# Patient Record
Sex: FEMALE | Race: OTHER | Employment: UNEMPLOYED | URBAN - METROPOLITAN AREA
[De-identification: names, ages, dates, MRNs, and addresses within clinical notes are randomized per-mention and may not be internally consistent; named-entity substitution may affect disease eponyms.]

---

## 2017-10-15 ENCOUNTER — HOSPITAL ENCOUNTER (EMERGENCY)
Age: 22
Discharge: HOME | End: 2017-10-15
Attending: EMERGENCY MEDICINE
Payer: SELF-PAY

## 2017-10-15 VITALS
HEART RATE: 72 BPM | SYSTOLIC BLOOD PRESSURE: 121 MMHG | TEMPERATURE: 97.2 F | HEIGHT: 64 IN | WEIGHT: 147.49 LBS | OXYGEN SATURATION: 98 % | RESPIRATION RATE: 20 BRPM | DIASTOLIC BLOOD PRESSURE: 71 MMHG | BODY MASS INDEX: 25.18 KG/M2

## 2017-10-15 DIAGNOSIS — N39.0 URINARY TRACT INFECTION WITHOUT HEMATURIA, SITE UNSPECIFIED: Primary | ICD-10-CM

## 2017-10-15 LAB
APPEARANCE UR: CLEAR
BACTERIA URNS QL MICRO: ABNORMAL /HPF
BILIRUB UR QL: NEGATIVE
COLOR UR: ABNORMAL
EPITH CASTS URNS QL MICRO: ABNORMAL /LPF
GLUCOSE UR STRIP.AUTO-MCNC: NEGATIVE MG/DL
HCG UR QL: NEGATIVE
HGB UR QL STRIP: NEGATIVE
HYALINE CASTS URNS QL MICRO: ABNORMAL /LPF (ref 0–5)
KETONES UR QL STRIP.AUTO: NEGATIVE MG/DL
LEUKOCYTE ESTERASE UR QL STRIP.AUTO: ABNORMAL
NITRITE UR QL STRIP.AUTO: NEGATIVE
PH UR STRIP: 7 [PH] (ref 5–8)
PROT UR STRIP-MCNC: NEGATIVE MG/DL
RBC #/AREA URNS HPF: ABNORMAL /HPF (ref 0–5)
SP GR UR REFRACTOMETRY: 1.01 (ref 1–1.03)
UA: UC IF INDICATED,UAUC: ABNORMAL
UROBILINOGEN UR QL STRIP.AUTO: 0.2 EU/DL (ref 0.2–1)
WBC URNS QL MICRO: ABNORMAL /HPF (ref 0–4)

## 2017-10-15 PROCEDURE — 81025 URINE PREGNANCY TEST: CPT

## 2017-10-15 PROCEDURE — 87186 SC STD MICRODIL/AGAR DIL: CPT | Performed by: EMERGENCY MEDICINE

## 2017-10-15 PROCEDURE — 87086 URINE CULTURE/COLONY COUNT: CPT | Performed by: EMERGENCY MEDICINE

## 2017-10-15 PROCEDURE — 99283 EMERGENCY DEPT VISIT LOW MDM: CPT

## 2017-10-15 PROCEDURE — 81001 URINALYSIS AUTO W/SCOPE: CPT | Performed by: EMERGENCY MEDICINE

## 2017-10-15 PROCEDURE — 87077 CULTURE AEROBIC IDENTIFY: CPT | Performed by: EMERGENCY MEDICINE

## 2017-10-15 RX ORDER — PHENAZOPYRIDINE HYDROCHLORIDE 200 MG/1
200 TABLET, FILM COATED ORAL 3 TIMES DAILY
Qty: 6 TAB | Refills: 0 | Status: SHIPPED | OUTPATIENT
Start: 2017-10-15 | End: 2017-10-17

## 2017-10-15 RX ORDER — CEPHALEXIN 500 MG/1
500 CAPSULE ORAL 4 TIMES DAILY
Qty: 28 CAP | Refills: 0 | Status: SHIPPED | OUTPATIENT
Start: 2017-10-15 | End: 2017-10-22

## 2017-10-15 NOTE — DISCHARGE INSTRUCTIONS
Infección urinaria en las mujeres: Instrucciones de cuidado - [ Urinary Tract Infection in Women: Care Instructions ]  Instrucciones de cuidado    Tami infección urinaria (UTI, por monica siglas en inglés) es un término general que hace referencia a tami infección que se produce en cualquier parte entre los riñones y la uretra (conducto por el cual se expulsa la orina). La mayoría de las UTI son infecciones de la vejiga. Con frecuencia, causan dolor o ardor al Kian[place]omal. Las UTI son causadas por bacterias y pueden curarse con antibióticos. Asegúrese de completar el tratamiento para que la infección desaparezca. La atención de seguimiento es tami parte clave de reveles tratamiento y seguridad. Asegúrese de hacer y acudir a todas las citas, y llame a reveles médico si está teniendo problemas. También es tami buena idea saber los resultados de monica exámenes y mantener tami lista de los medicamentos que chang. ¿Cómo puede cuidarse en el hogar? · [address] No deje de tomarlos por el hecho de sentirse mejor. Debe racquel todos los antibióticos hasta terminarlos. · Quiana los próximos rani o [address], danae mayor cantidad de [place] y otros líquidos. Railroad puede ayudar a eliminar las bacterias que provocan la infección. (Si tiene tami enfermedad de los riñones, el corazón o el hígado y tiene que [employer] líquidos, hable con reveles médico antes de aumentar reveles consumo). · Evite las bebidas gaseosas o con cafeína. Pueden irritar la vejiga. · Orine con frecuencia. Trate de vaciar la vejiga cada vez que orine. · Para aliviar el dolor, tome un baño caliente o colóquese tami almohadilla térmica a baja temperatura sobre la parte baja del abdomen o la elodia genital. Nunca se duerma mientras [place] tami almohadilla térmica. Para prevenir las infecciones urinarias  · Danae abundante agua todos los días. Railroad la ayuda a orinar con frecuencia, lo que elimina las bacterias de reveles organismo.  (Si tiene [employer] riñones, el corazón o el hígado y tiene que L[employer] líquidos, hable con reveles médico antes de aumentar reveles consumo). · Orine cuando necesite hacerlo. · Orine inmediatamente después de liliana tenido [employer]. · Cámbiese las toallas sanitarias con frecuencia. · Evite el uso de lavados vaginales, los soy de burbujas, los [place]ter[place] de higiene femenina y otros productos para la higiene femenina que contengan desodorantes. · Después de ir al baño, límpiese de adelante hacia atrás. ¿Cuándo debe pedir ayuda? Llame a reveles médico ahora mismo o busque atención médica inmediata si:  · Aparecen síntomas erik fiebre, escalofríos, náuseas o vómito por [place], o empeoran. · Tiene un nuevo dolor de espalda ced debajo de las [place]. [place] se llama dolor en el flanco.  · Tiene rosalee o pus nuevos en la orina. · Tiene problemas con reveles antibiótico.  Preste especial atención a los cambios en reveles jv y asegúrese de comunicarse con reveles médico si:  · No mejora después de liliana tomado un antibiótico manju 2 días. · Bonnie síntomas desaparecen y [employer]. ¿Dónde puede encontrar más información en inglés? Natalia Joni a http://thea-kaylee.info/. Kayla Fiore G661 en la búsqueda para aprender más acerca de \"Infección urinaria en las mujeres: Instrucciones de cuidado - [ Urinary Tract Infection in Women: Care Instructions ]. \"  Revisado: 28 noviembre, 2016  Versión del contenido: 11.3  © 7993-7306 [employer]. Las instrucciones de cuidado fueron adaptadas bajo licencia por [employer] (which disclaims liability or warranty for this information). Si usted tiene [place] afección médica o sobre estas instrucciones, siempre pregunte a reveles profesional de jv. [facility] [employer] niega toda garantía o responsabilidad por reveles uso de esta información.

## 2017-10-15 NOTE — ED PROVIDER NOTES
HPI Comments: 24 y.o. female with past medical history significant for UTI who presents from home with chief complaint of dysuria. Pt reports 1 week hx of dysuria accompanied by left flank pain. Pt notes hx of similar symptoms in the past related to UTI (last UTI x 1 year ago). She has been drinking cranberry juice without relief. Pt states that she has felt warm but has not had a known fever. Pt further notes hx of miscarriage x 4 months ago and was treated at [facility]. She states that there is a possibility that she is pregnant. Pt denies vaginal discharge or atypical bleeding. No nausea, vomiting, diarrhea, constipation or abdominal pain. There are no other acute medical concerns at this time. Social hx: Non-smoker. No EtOH or illicit drug use. PCP: None    Note written by Veena Adams, as dictated by Dieter Cowart NP 2:03 AM      The history is provided by the patient. No past medical history on file. No past surgical history on file. No family history on file. Social History     Social History    Marital status: N/A     Spouse name: N/A    Number of children: N/A    Years of education: N/A     Occupational History    Not on file. Social History Main Topics    Smoking status: Not on file    Smokeless tobacco: Not on file    Alcohol use Not on file    Drug use: Not on file    Sexual activity: Not on file     Other Topics Concern    Not on file     Social History Narrative         ALLERGIES: Review of patient's allergies indicates no known allergies. Review of Systems   Constitutional: Negative for fever. HENT: Negative for congestion and sore throat. Eyes: Negative for photophobia. Respiratory: Negative for cough and shortness of breath. Cardiovascular: Negative for chest pain and leg swelling. Gastrointestinal: Negative for abdominal pain, constipation, diarrhea, nausea and vomiting. Genitourinary: Positive for dysuria and flank pain (left). Negative for difficulty urinating, hematuria, vaginal bleeding and vaginal discharge. Musculoskeletal: Negative for back pain and neck pain. Skin: Negative for rash. Neurological: Negative for dizziness, weakness, numbness and headaches. All other systems reviewed and are negative. Vitals:    10/15/17 0023   BP: 121/71   Pulse: 72   Resp: 20   Temp: 97.2 °F (36.2 °C)   SpO2: 98%   Weight: 66.9 kg (147 lb 7.8 oz)   Height: 5' 4\" (1.626 m)            Physical Exam   Constitutional: She is oriented to person, place, and time. She appears well-developed and well-nourished. No distress. HENT:   Head: Normocephalic and atraumatic. Right Ear: External ear normal.   Left Ear: External ear normal.   Nose: Nose normal.   Mouth/Throat: Oropharynx is clear and moist. No oropharyngeal exudate. Eyes: Conjunctivae and EOM are normal. Pupils are equal, round, and reactive to light. Neck: Normal range of motion. Neck supple. Cardiovascular: Normal rate, regular rhythm, normal heart sounds and intact distal pulses. Pulmonary/Chest: Effort normal and breath sounds normal.   Abdominal: Soft. Bowel sounds are normal. There is no tenderness. There is no rebound and no guarding. Musculoskeletal: Normal range of motion. Neurological: She is alert and oriented to person, place, and time. Skin: Skin is warm and dry. Psychiatric: She has a normal mood and affect. Her behavior is normal. Judgment and thought content normal.   Nursing note and vitals reviewed.        MDM  Number of Diagnoses or Management Options  Urinary tract infection without hematuria, site unspecified:      Amount and/or Complexity of Data Reviewed  Clinical lab tests: ordered and reviewed  Review and summarize past medical records: yes      ED Course       Procedures    LABORATORY TESTS:  Recent Results (from the past 12 hour(s))   HCG URINE, QL. - POC    Collection Time: 10/15/17 12:39 AM   Result Value Ref Range    Pregnancy test,urine (POC) NEGATIVE  NEG     URINALYSIS W/ REFLEX CULTURE    Collection Time: 10/15/17 12:40 AM   Result Value Ref Range    Color YELLOW/STRAW      Appearance CLEAR CLEAR      Specific gravity 1.006 1.003 - 1.030      pH (UA) 7.0 5.0 - 8.0      Protein NEGATIVE  NEG mg/dL    Glucose NEGATIVE  NEG mg/dL    Ketone NEGATIVE  NEG mg/dL    Bilirubin NEGATIVE  NEG      Blood NEGATIVE  NEG      Urobilinogen 0.2 0.2 - 1.0 EU/dL    Nitrites NEGATIVE  NEG      Leukocyte Esterase MODERATE (A) NEG      WBC  0 - 4 /hpf    RBC 0-5 0 - 5 /hpf    Epithelial cells FEW FEW /lpf    Bacteria 4+ (A) NEG /hpf    UA:UC IF INDICATED URINE CULTURE ORDERED (A) CNI      Hyaline cast 0-2 0 - 5 /lpf       IMAGING RESULTS:  No orders to display       MEDICATIONS GIVEN:  Medications - No data to display    IMPRESSION:  1. Urinary tract infection without hematuria, site unspecified        PLAN:  1. Discharge Medication List as of 10/15/2017  1:26 AM      START taking these medications    Details   cephALEXin (KEFLEX) 500 mg capsule Take 1 Cap by mouth four (4) times daily for 7 days. , Print, Disp-28 Cap, R-0      phenazopyridine (PYRIDIUM) 200 mg tablet Take 1 Tab by mouth three (3) times daily for 2 days. , Print, Disp-6 Tab, R-0           2. Follow-up Information     Follow up With Details Comments Via Alisa Marie Schedule an appointment as soon as possible for a visit  [address] [facility]. [address] [place] [address] Schedule an appointment as soon as possible for a visit for women's health concerns  [address] [place] [address] [place] [address]  [phone]    [facility] EMERGENCY DEPT Go to As needed, If symptoms worsen [facility]  [phone]        3. Return to ED if worse     Discharge Note:    The patient is ready for discharge.  The patient's signs, symptoms, diagnosis, and discharge instruction have been discussed and the patient has conveyed their understanding. The patient is to follow up as recommended or return to the ER should their symptoms worsen. Plan has been discussed and the patient is in agreement.     Zachery Childress NP

## 2017-10-17 LAB
BACTERIA SPEC CULT: ABNORMAL
CC UR VC: ABNORMAL
SERVICE CMNT-IMP: ABNORMAL

## 2017-11-27 ENCOUNTER — HOSPITAL ENCOUNTER (EMERGENCY)
Age: 22
Discharge: HOME | End: 2017-11-28
Attending: EMERGENCY MEDICINE | Admitting: EMERGENCY MEDICINE
Payer: SELF-PAY

## 2017-11-27 DIAGNOSIS — Z32.01 PREGNANCY TEST PERFORMED, PREGNANCY CONFIRMED: ICD-10-CM

## 2017-11-27 DIAGNOSIS — J20.9 ACUTE BRONCHITIS, UNSPECIFIED ORGANISM: Primary | ICD-10-CM

## 2017-11-27 LAB
APPEARANCE UR: CLEAR
BASOPHILS # BLD: 0.1 K/UL (ref 0–0.1)
BASOPHILS NFR BLD: 1 % (ref 0–1)
BILIRUB UR QL: NEGATIVE
COLOR UR: NORMAL
DEPRECATED S PYO AG THROAT QL EIA: NEGATIVE
EOSINOPHIL # BLD: 0.3 K/UL (ref 0–0.4)
EOSINOPHIL NFR BLD: 3 % (ref 0–7)
ERYTHROCYTE [DISTWIDTH] IN BLOOD BY AUTOMATED COUNT: 14.9 % (ref 11.5–14.5)
GLUCOSE UR STRIP.AUTO-MCNC: NEGATIVE MG/DL
HCG UR QL: POSITIVE
HCT VFR BLD AUTO: 36.3 % (ref 35–47)
HGB BLD-MCNC: 12.1 G/DL (ref 11.5–16)
HGB UR QL STRIP: NEGATIVE
KETONES UR QL STRIP.AUTO: NEGATIVE MG/DL
LEUKOCYTE ESTERASE UR QL STRIP.AUTO: NEGATIVE
LYMPHOCYTES # BLD: 2.7 K/UL (ref 0.8–3.5)
LYMPHOCYTES NFR BLD: 28 % (ref 12–49)
MCH RBC QN AUTO: 29.9 PG (ref 26–34)
MCHC RBC AUTO-ENTMCNC: 33.3 G/DL (ref 30–36.5)
MCV RBC AUTO: 89.6 FL (ref 80–99)
MONOCYTES # BLD: 0.6 K/UL (ref 0–1)
MONOCYTES NFR BLD: 7 % (ref 5–13)
NEUTS SEG # BLD: 6 K/UL (ref 1.8–8)
NEUTS SEG NFR BLD: 61 % (ref 32–75)
NITRITE UR QL STRIP.AUTO: NEGATIVE
PH UR STRIP: 7 [PH] (ref 5–8)
PLATELET # BLD AUTO: 363 K/UL (ref 150–400)
PROT UR STRIP-MCNC: NEGATIVE MG/DL
RBC # BLD AUTO: 4.05 M/UL (ref 3.8–5.2)
SP GR UR REFRACTOMETRY: 1.02 (ref 1–1.03)
UROBILINOGEN UR QL STRIP.AUTO: 1 EU/DL (ref 0.2–1)
WBC # BLD AUTO: 9.6 K/UL (ref 3.6–11)

## 2017-11-27 PROCEDURE — 81003 URINALYSIS AUTO W/O SCOPE: CPT | Performed by: EMERGENCY MEDICINE

## 2017-11-27 PROCEDURE — 99283 EMERGENCY DEPT VISIT LOW MDM: CPT

## 2017-11-27 PROCEDURE — 84702 CHORIONIC GONADOTROPIN TEST: CPT | Performed by: EMERGENCY MEDICINE

## 2017-11-27 PROCEDURE — 36415 COLL VENOUS BLD VENIPUNCTURE: CPT | Performed by: EMERGENCY MEDICINE

## 2017-11-27 PROCEDURE — 74011250637 HC RX REV CODE- 250/637: Performed by: EMERGENCY MEDICINE

## 2017-11-27 PROCEDURE — 87070 CULTURE OTHR SPECIMN AEROBIC: CPT | Performed by: EMERGENCY MEDICINE

## 2017-11-27 PROCEDURE — 80053 COMPREHEN METABOLIC PANEL: CPT | Performed by: EMERGENCY MEDICINE

## 2017-11-27 PROCEDURE — 87880 STREP A ASSAY W/OPTIC: CPT | Performed by: EMERGENCY MEDICINE

## 2017-11-27 PROCEDURE — 86900 BLOOD TYPING SEROLOGIC ABO: CPT | Performed by: EMERGENCY MEDICINE

## 2017-11-27 PROCEDURE — 85025 COMPLETE CBC W/AUTO DIFF WBC: CPT | Performed by: EMERGENCY MEDICINE

## 2017-11-27 PROCEDURE — 81025 URINE PREGNANCY TEST: CPT

## 2017-11-27 PROCEDURE — 87804 INFLUENZA ASSAY W/OPTIC: CPT | Performed by: EMERGENCY MEDICINE

## 2017-11-27 RX ORDER — ACETAMINOPHEN 325 MG/1
650 TABLET ORAL
Status: COMPLETED | OUTPATIENT
Start: 2017-11-27 | End: 2017-11-27

## 2017-11-27 RX ADMIN — ACETAMINOPHEN 650 MG: 325 TABLET ORAL at 23:39

## 2017-11-27 NOTE — Clinical Note
- Azithromycin as prescribed for bronchitis. - Begin prenatal vitamins. 
- Please follow-up at one of the resources provided for prenatal care. - Return to ED for any concerns. - Azitromicina erik se prescribe para la bronquitis. - Comience con l as vitaminas prenatales. - Jennifer un seguimiento en rani de los recursos provistos para la atención prenatal. 
- Brandy Banana a ED por cualquier inquietud.

## 2017-11-28 ENCOUNTER — APPOINTMENT (OUTPATIENT)
Dept: ULTRASOUND IMAGING | Age: 22
End: 2017-11-28
Attending: EMERGENCY MEDICINE
Payer: SELF-PAY

## 2017-11-28 VITALS
OXYGEN SATURATION: 98 % | DIASTOLIC BLOOD PRESSURE: 85 MMHG | RESPIRATION RATE: 18 BRPM | SYSTOLIC BLOOD PRESSURE: 118 MMHG | TEMPERATURE: 97.3 F | HEART RATE: 86 BPM

## 2017-11-28 LAB
ABO + RH BLD: NORMAL
ALBUMIN SERPL-MCNC: 3.9 G/DL (ref 3.5–5)
ALBUMIN/GLOB SERPL: 1.1 {RATIO} (ref 1.1–2.2)
ALP SERPL-CCNC: 97 U/L (ref 45–117)
ALT SERPL-CCNC: 98 U/L (ref 12–78)
ANION GAP SERPL CALC-SCNC: 10 MMOL/L (ref 5–15)
AST SERPL-CCNC: 56 U/L (ref 15–37)
BILIRUB SERPL-MCNC: 0.3 MG/DL (ref 0.2–1)
BLOOD BANK CMNT PATIENT-IMP: NORMAL
BUN SERPL-MCNC: 8 MG/DL (ref 6–20)
BUN/CREAT SERPL: 12 (ref 12–20)
CALCIUM SERPL-MCNC: 8.9 MG/DL (ref 8.5–10.1)
CHLORIDE SERPL-SCNC: 103 MMOL/L (ref 97–108)
CLUE CELLS VAG QL WET PREP: NORMAL
CO2 SERPL-SCNC: 26 MMOL/L (ref 21–32)
CREAT SERPL-MCNC: 0.65 MG/DL (ref 0.55–1.02)
FLUAV AG NPH QL IA: NEGATIVE
FLUBV AG NOSE QL IA: NEGATIVE
GLOBULIN SER CALC-MCNC: 3.4 G/DL (ref 2–4)
GLUCOSE SERPL-MCNC: 88 MG/DL (ref 65–100)
HCG SERPL-ACNC: 2213 MIU/ML (ref 0–6)
KOH PREP SPEC: NORMAL
POTASSIUM SERPL-SCNC: 3.6 MMOL/L (ref 3.5–5.1)
PROT SERPL-MCNC: 7.3 G/DL (ref 6.4–8.2)
SERVICE CMNT-IMP: NORMAL
SODIUM SERPL-SCNC: 139 MMOL/L (ref 136–145)
T VAGINALIS VAG QL WET PREP: NORMAL

## 2017-11-28 PROCEDURE — 87210 SMEAR WET MOUNT SALINE/INK: CPT | Performed by: EMERGENCY MEDICINE

## 2017-11-28 PROCEDURE — 76801 OB US < 14 WKS SINGLE FETUS: CPT

## 2017-11-28 PROCEDURE — 87491 CHLMYD TRACH DNA AMP PROBE: CPT | Performed by: EMERGENCY MEDICINE

## 2017-11-28 PROCEDURE — 76817 TRANSVAGINAL US OBSTETRIC: CPT

## 2017-11-28 RX ORDER — AZITHROMYCIN 250 MG/1
TABLET, FILM COATED ORAL
Qty: 6 TAB | Refills: 0 | Status: SHIPPED | OUTPATIENT
Start: 2017-11-28 | End: 2017-12-03

## 2017-11-28 NOTE — ED TRIAGE NOTES
Pt has a fever and cough and cold for 2 weeks. No medicine taken today. Pt says \"by the way\" on Thursday she \"took an at home pregnancy test and it was positive. \"      Pt reports pain in abdomen. Pt is Bengali speaking. Blue phones used. 10:32 PM  Pt is in waiting room with family, playing with her child, alert and in no distress.

## 2017-11-28 NOTE — ED NOTES
Discharge instructions given to pt. By provider. All questions answered and pt verbalized understanding. V/S stable @ time of discharge. No lines or drains in place. Pt ambulatory out of unit.

## 2017-11-28 NOTE — DISCHARGE INSTRUCTIONS
Bronquitis: Instrucciones de cuidado - [ Bronchitis: Care Instructions ]  Instrucciones de cuidado    La bronquitis es tami inflamación de los bronquios (conductos bronquiales), que llevan aire a los pulmones. Los tubos se hinchan y producen mucosidad, o flema. La mucosidad y los bronquios inflamados hacen que tosa. Es posible que tenga problemas para respirar. Ramonia Musca de los casos de bronquitis son causados por virus erik los que causan los resfriados. Los antibióticos generalmente no ayudan y pueden ser dañinos. La bronquitis suele aparecer con Barnie Pen y dura alrededor de 2 a 3 semanas en personas que por lo demás son saludables. La atención de seguimiento es tami parte clave de reveles tratamiento y seguridad. Asegúrese de hacer y acudir a todas las citas, y llame a reveles médico si está teniendo problemas. También es tami buena idea saber los resultados de los exámenes y mantener tami lista de los medicamentos que chang. ¿Cómo puede cuidarse en el hogar? · [employer] medicamentos exactamente erik le fueron recetados. Llame a reveles médico si tiffanie que está teniendo un problema con monica medicamentos. · Descanse un poco más. · [place] un analgésico (medicamento para el dolor) de venta mona, erik acetaminofén (Tylenol), ibuprofeno (Advil, Motrin) o naproxeno (Aleve), para reducir la fiebre y [employer] lemuel en el cuerpo. Roxann y siga todas las instrucciones de la [place]. · No tome dos o más analgésicos al mismo tiempo a menos que el médico se lo haya indicado. Muchos analgésicos contienen acetaminofén, es decir, Tylenol. El exceso de acetaminofén (Tylenol) puede ser dañino. · [place] un medicamento para la tos de venta mona que contenga dextrometorfano para ayudar a aliviar la tos seca y persistente y así poder dormir. Evite los medicamentos para la tos que tengan más de un ingrediente [place]. Roxann y siga todas las instrucciones de la [place].   · Respire aire húmedo de un humidificador, ducha caliente o lavabo lleno de [place]. El calor y la humedad adelgazarán la mucosidad para que pueda expulsarla. · No fume. Fumar puede empeorar la bronquitis. Si necesita ayuda para dejar de fumar, hable con reveles médico sobre programas y medicamentos para dejar de fumar. Estos pueden aumentar monica probabilidades de dejar el hábito para siempre. ¿Cuándo debe pedir ayuda? Llame al 911 en cualquier momento que considere que necesita atención de emergencia. Por ejemplo, llame si:  ? · [address] dificultades para respirar. ? Llame a reveles médico ahora mismo o busque atención médica inmediata si:  ? · Tiene nueva o peor dificultad para respirar. ? · Tose mucosidad (esputo) de color café oscuro o con rosalee. ? · Tiene tami fiebre nueva o más naz. ? · Tiene un salpullido nuevo. ?Preste especial atención a los cambios en reveles jv y asegúrese de comunicarse con reveles médico si:  ? · Reveles tos es más profunda o más frecuente que antes, especialmente si nota más mucosidad o un cambio en el color de la mucosidad. ? · No mejora erik se esperaba. ¿Dónde puede encontrar más información en inglés? [address] a http://thea-kaylee.info/. Escriba H333 en la búsqueda para aprender más acerca de \"Bronquitis: Instrucciones de cuidado - [ Bronchitis: Care Instructions ]. \"  Revisado: 12 dixon, 2017  Versión del contenido: 11.4  © 1380-3382 Healthwise, Incorporated. Las instrucciones de cuidado fueron adaptadas bajo licencia por [employer] (which disclaims liability or warranty for this information). Si usted tiene [place] afección médica o sobre estas instrucciones, siempre pregunte a reveles profesional de jv. Healthwise, Incorporated niega toda garantía o responsabilidad por reveles uso de esta información.     Amenaza de aborto espontáneo: Después de tami visita a la hugo de emergencias  [Threatened Miscarriage: After Your Visit to the Emergency Room]  Instrucciones de cuidado  Algunas mujeres tienen Jhonny o sangrado vaginal leve manju las primeras 12 semanas del embarazo. En algunos casos, esto es normal. El manchado o sangrado vaginal leve también puede ser tami señal de [facility] posible pérdida del embarazo. [place] se conoce erik amenaza de aborto espontáneo. En jp punto, es posible que el médico no pueda decirle si reveles sangrado vaginal es normal o es tami amenaza de aborto espontáneo. Al principio del embarazo, cosas erik el estrés, el ejercicio y las relaciones sexuales no provocan sangrado vaginal o aborto espontáneo. Podría estar preocupada o cristofer por la posibilidad de perder el embarazo. [place] no se culpe. No existe un tratamiento para detener tami amenaza de aborto espontáneo. Si tiene un aborto espontáneo, no hay nada que pueda liliana hecho para prevenirlo. Un aborto espontáneo generalmente significa que el embarazo no se está desarrollando normalmente. Aunque haya sido rita de naz de la hugo de emergencias, todavía es necesario que esté atenta a cualquier problema que se presente. El médico le hizo un cuidadoso chequeo. Erum a veces los problemas pueden aparecer posteriormente. Si tiene nuevos síntomas o éstos no mejoran, regrese a la hugo de emergencias o llame a reveles médico de inmediato. Acudir a la hugo de emergencias es solo un paso en reveles tratamiento. Aunque se sienta mejor, todavía deberá hacer lo que reveels médico le recomiende, erik asistir a todas las visitas de seguimiento sugeridas y racquel los medicamentos exactamente [employer]	[place] fueron indicados. [place] le ayudará a recuperarse y prevenir problemas futuros. ¿Cómo puede cuidarse en el hogar? · Si tiene un aborto espontáneo, es probable que experimente algo de sangrado vaginal manju 1 a 2 semanas. Use toallas sanitarias en lugar de tampones. Cuente las toallas sanitarias que [place] cada día y lleve un registro de la cantidad. [place] la ayudará a saber si el sangrado está disminuyendo. · [place] acetaminofén (Tylenol) para los cólicos (retortijones).  Roxann y siga todas las instrucciones de la etiqueta. · No tome dos o más analgésicos (medicamentos para el dolor) al [employer] tiempo a menos que el médico se lo haya indicado. Muchos analgésicos contienen acetaminofén, es decir, Tylenol. El exceso de acetaminofén (Tylenol) puede ser dañino. · No tenga relaciones sexuales hasta que reveles médico lo apruebe. · Descanse mucho manju los lilly siguientes. · Puede hacer monica actividades habituales si se siente lo suficientemente niall para hacerlas. [place] no toni ejercicio pesado hasta que reveles médico lo apruebe. · Consuma tami dieta balanceada uma en bhupinder y vitamina C. Entre los alimentos ricos en bhupinder se encuentran las nadya hernandez, los [facility], los SANDEFRD, los frijoles (habichuelas) y los vegetales de hojas verdes. Los alimentos con alto contenido de vitamina C incluyen las frutas cítricas, los tomates y el brócoli. Hable con reveles médico acerca de si usted necesita racquel pastillas de bhupinder o un multivitamínico.  · No mike alcohol, ni use tabaco o drogas ilegales. · No fume. Si necesita ayuda para dejar de fumar, hable con reveles médico acerca de los programas y medicamentos para dejar de fumar. Estos pueden aumentar monica probabilidades de dejar el hábito para siempre. ¿Cuándo debe pedir ayuda? Llame al 911 si:  · Tiene dolor repentino e intenso en el vientre. · Se desmayó (perdió el conocimiento). Regrese ahora mismo a la hugo de emergencias si:  · Tiene sangrado vaginal intenso. Elimina coágulos de [place]' y empapa por completo las toallas sanitarias usuales cada hora manju 2 o más horas. · Vuelve el sangrado vaginal.  Llame hoy a reveles médico si:  · Tiene cólicos o dolor en el vientre o la pelvis. · Tiene fiebre. · Tiene flujo vaginal con un olor desagradable. · El sangrado vaginal no se ha detenido completamente después de 3 días. ¿Dónde puede encontrar más información en inglés?    Vaya a [email or web address].be  [place]  L344 en la búsqueda para aprender más acerca de \"Amenaza de aborto espontáneo: Después de tami visita a la hugo de emergencias. \"   © 3110-8946 Healthwise, Incorporated. Instrucciones de cuidado adaptadas por Cherl Grain (which disclaims liability or warranty for this information). Estas instrucciones de cuidado son para usarlas con reveles profesional clínico registrado. Si usted tiene preguntas acerca de tami condición médica o acerca de estas instrucciones de cuidado, siempre pregúntele a reveles profesional clínico registrado. Healthwise, Incorporated no acepta ninguna garantía ni responsabilidad por el uso de [employer].   Versión del contenido: 9.1.28139; Última revisión: 13 enero, 2011

## 2017-11-28 NOTE — ED PROVIDER NOTES
HPI Comments: The patient presents to the ED with multiple complaints. She notes she is pregnant with LMP on 10/15/17 and positive pregnancy test on Friday. She is . She was in her usual state of health until 2 weeks ago. She developed subjective fever on Thursday. She feels she has had a fever every day since Thursday. She has also had a cough productive of white phlegm for 2 weeks. She has had a lot of generalized headaches and feels dizzy. She denies rhinorrhea or nasal congestion. She has a sore throat. She feels she has the the flu. She notes uterine pain. Pain is 5/10. Her uterine pain began on Friday. She denies any vaginal bleeding. She denies any vaginal discharge. She has dysuria. She denies any known sick contacts. She has not had her flu vaccine this year. She has been taking Advil for her symptoms. Patient is a 20 y.o. female presenting with fever, cough, pregnancy problem, and abdominal pain. The history is provided by the patient. Fever    Associated symptoms include headaches, sore throat and cough. Pertinent negatives include no chest pain, no diarrhea, no vomiting, no congestion, no shortness of breath and no rash. Cough   Associated symptoms include headaches and sore throat. Pertinent negatives include no chest pain, no chills, no shortness of breath, no nausea and no vomiting. Pregnancy Problem    Associated symptoms include a fever (subjective), dysuria and headaches. Pertinent negatives include no diarrhea, no nausea, no vomiting and no chest pain. Abdominal Pain    Associated symptoms include a fever (subjective), dysuria and headaches. Pertinent negatives include no diarrhea, no nausea, no vomiting and no chest pain. No past medical history on file. No past surgical history on file. No family history on file.     Social History     Social History    Marital status: SINGLE     Spouse name: N/A    Number of children: N/A    Years of education: N/A Occupational History    Not on file. Social History Main Topics    Smoking status: Not on file    Smokeless tobacco: Not on file    Alcohol use Not on file    Drug use: Not on file    Sexual activity: Not on file     Other Topics Concern    Not on file     Social History Narrative    No narrative on file         ALLERGIES: Review of patient's allergies indicates no known allergies. Review of Systems   Constitutional: Positive for fever (subjective). Negative for appetite change and chills. HENT: Positive for sore throat. Negative for congestion and nosebleeds. Eyes: Negative for pain and discharge. Respiratory: Positive for cough. Negative for shortness of breath. Cardiovascular: Negative for chest pain. Gastrointestinal: Positive for abdominal pain. Negative for diarrhea, nausea and vomiting. Genitourinary: Positive for dysuria and pelvic pain. Musculoskeletal: Negative. Skin: Negative for rash. Neurological: Positive for dizziness and headaches. Negative for weakness. Hematological: Negative for adenopathy. Psychiatric/Behavioral: Negative. All other systems reviewed and are negative. Vitals:    11/27/17 2138   BP: 117/75   Pulse: 93   Resp: 20   Temp: 97.9 °F (36.6 °C)   SpO2: 97%            Physical Exam   Constitutional: She is oriented to person, place, and time. She appears well-developed and well-nourished. HENT:   Head: Normocephalic and atraumatic. Mouth/Throat: Oropharynx is clear and moist.   Eyes: Conjunctivae are normal.   Neck: Normal range of motion. Neck supple. Cardiovascular: Normal rate, regular rhythm and normal heart sounds. Pulmonary/Chest: Effort normal and breath sounds normal.   clear   Abdominal: Soft. Bowel sounds are normal. There is no tenderness. Genitourinary: No vaginal discharge found. Genitourinary Comments: Mild uterine TTP. Cervix closed. No adnexal TTP. Musculoskeletal: Normal range of motion.  She exhibits no edema or tenderness. Neurological: She is alert and oriented to person, place, and time. Skin: Skin is warm and dry. Psychiatric: She has a normal mood and affect. Her behavior is normal.   Nursing note and vitals reviewed. [facility]  ED Course       Procedures    A/P:  1. Bronchitis - symptoms for >2 weeks. Treat with azithromycin. 2. Pregnancy, early - begin PNV. F/U with ob/gyn. List of resources provided. 3. Pelvic pain - resolved prior to discharge. Advised acetaminophen prn. Patient's results have been reviewed with them. Patient and/or family have verbally conveyed their understanding and agreement of the patient's signs, symptoms, diagnosis, treatment and prognosis and additionally agree to follow up as recommended or return to the Emergency Room should their condition change prior to follow-up. Discharge instructions have also been provided to the patient with some educational information regarding their diagnosis as well a list of reasons why they would want to return to the ER prior to their follow-up appointment should their condition change.

## 2017-11-29 LAB
C TRACH DNA SPEC QL NAA+PROBE: NEGATIVE
N GONORRHOEA DNA SPEC QL NAA+PROBE: NEGATIVE
SAMPLE TYPE: NORMAL
SERVICE CMNT-IMP: NORMAL
SPECIMEN SOURCE: NORMAL

## 2017-11-30 LAB
BACTERIA SPEC CULT: NORMAL
SERVICE CMNT-IMP: NORMAL

## 2017-12-06 ENCOUNTER — APPOINTMENT (OUTPATIENT)
Dept: ULTRASOUND IMAGING | Age: 22
End: 2017-12-06
Attending: PHYSICIAN ASSISTANT
Payer: SELF-PAY

## 2017-12-06 ENCOUNTER — HOSPITAL ENCOUNTER (EMERGENCY)
Age: 22
Discharge: HOME | End: 2017-12-06
Attending: EMERGENCY MEDICINE
Payer: SELF-PAY

## 2017-12-06 VITALS
DIASTOLIC BLOOD PRESSURE: 69 MMHG | OXYGEN SATURATION: 98 % | SYSTOLIC BLOOD PRESSURE: 117 MMHG | TEMPERATURE: 98.1 F | HEIGHT: 65 IN | HEART RATE: 88 BPM | RESPIRATION RATE: 20 BRPM

## 2017-12-06 DIAGNOSIS — O03.9 COMPLETE MISCARRIAGE: Primary | ICD-10-CM

## 2017-12-06 LAB
ALBUMIN SERPL-MCNC: 3.7 G/DL (ref 3.5–5)
ALBUMIN/GLOB SERPL: 1.1 {RATIO} (ref 1.1–2.2)
ALP SERPL-CCNC: 90 U/L (ref 45–117)
ALT SERPL-CCNC: 62 U/L (ref 12–78)
ANION GAP SERPL CALC-SCNC: 10 MMOL/L (ref 5–15)
AST SERPL-CCNC: 33 U/L (ref 15–37)
BASOPHILS # BLD: 0 K/UL (ref 0–0.1)
BASOPHILS NFR BLD: 0 % (ref 0–1)
BILIRUB SERPL-MCNC: 0.4 MG/DL (ref 0.2–1)
BUN SERPL-MCNC: 10 MG/DL (ref 6–20)
BUN/CREAT SERPL: 20 (ref 12–20)
CALCIUM SERPL-MCNC: 8.8 MG/DL (ref 8.5–10.1)
CHLORIDE SERPL-SCNC: 102 MMOL/L (ref 97–108)
CO2 SERPL-SCNC: 25 MMOL/L (ref 21–32)
CREAT SERPL-MCNC: 0.5 MG/DL (ref 0.55–1.02)
EOSINOPHIL # BLD: 0.1 K/UL (ref 0–0.4)
EOSINOPHIL NFR BLD: 1 % (ref 0–7)
ERYTHROCYTE [DISTWIDTH] IN BLOOD BY AUTOMATED COUNT: 14.4 % (ref 11.5–14.5)
GLOBULIN SER CALC-MCNC: 3.3 G/DL (ref 2–4)
GLUCOSE SERPL-MCNC: 117 MG/DL (ref 65–100)
HCG SERPL-ACNC: ABNORMAL MIU/ML (ref 0–6)
HCT VFR BLD AUTO: 35.9 % (ref 35–47)
HGB BLD-MCNC: 12.5 G/DL (ref 11.5–16)
LYMPHOCYTES # BLD: 2.1 K/UL (ref 0.8–3.5)
LYMPHOCYTES NFR BLD: 22 % (ref 12–49)
MCH RBC QN AUTO: 30.3 PG (ref 26–34)
MCHC RBC AUTO-ENTMCNC: 34.8 G/DL (ref 30–36.5)
MCV RBC AUTO: 87.1 FL (ref 80–99)
MONOCYTES # BLD: 0.5 K/UL (ref 0–1)
MONOCYTES NFR BLD: 5 % (ref 5–13)
NEUTS SEG # BLD: 7 K/UL (ref 1.8–8)
NEUTS SEG NFR BLD: 72 % (ref 32–75)
PLATELET # BLD AUTO: 302 K/UL (ref 150–400)
POTASSIUM SERPL-SCNC: 4.3 MMOL/L (ref 3.5–5.1)
PROT SERPL-MCNC: 7 G/DL (ref 6.4–8.2)
RBC # BLD AUTO: 4.12 M/UL (ref 3.8–5.2)
SODIUM SERPL-SCNC: 137 MMOL/L (ref 136–145)
WBC # BLD AUTO: 9.8 K/UL (ref 3.6–11)

## 2017-12-06 PROCEDURE — 76817 TRANSVAGINAL US OBSTETRIC: CPT

## 2017-12-06 PROCEDURE — 85025 COMPLETE CBC W/AUTO DIFF WBC: CPT | Performed by: PHYSICIAN ASSISTANT

## 2017-12-06 PROCEDURE — 99285 EMERGENCY DEPT VISIT HI MDM: CPT

## 2017-12-06 PROCEDURE — 36415 COLL VENOUS BLD VENIPUNCTURE: CPT | Performed by: PHYSICIAN ASSISTANT

## 2017-12-06 PROCEDURE — 76801 OB US < 14 WKS SINGLE FETUS: CPT

## 2017-12-06 PROCEDURE — 84702 CHORIONIC GONADOTROPIN TEST: CPT | Performed by: PHYSICIAN ASSISTANT

## 2017-12-06 PROCEDURE — 80053 COMPREHEN METABOLIC PANEL: CPT | Performed by: PHYSICIAN ASSISTANT

## 2017-12-06 NOTE — ED TRIAGE NOTES
Pt c/o painless vaginal bleeding that began yesterday and became heavier today. States using 3-4 pads per hour. Pt is 2 months pregnant. Setswana intepreter phone used. Pt does not speak [place].

## 2017-12-06 NOTE — ED PROVIDER NOTES
HPI Comments: Tammi Oakley is a 20 y.o.  who is approx. 6.5 weeks pregnant female presents to emergency room ambulatory c/o vaginal bleeding x 1300 today. She states she was lying in bed when she began to have vaginal bleeding, stating she \"soaked 2 towels and passed a lot of clots\". Denies lightheadedness, F/C, N/V/D, flank pain. Was seen at [facility] on  for URI sx's, also c/o cramping so had a + pregnancy test, was found to be O+ blood type, and had a pelvic US which showed a very early IUP. She has not f/u with OB for this pregnancy or after ER visit on . She was prescribed a Zpak and prenatals which she took. Had neg US, neg pelvic swabs- neg trich, no clue cells and neg GC/chlamydia and unremarkable labs, quant was 2213. PCP: None    Surgical hx- none  Social hx- no tobacco    The patient has no other complaints at this time. Patient is a 20 y.o. female presenting with vaginal bleeding and pregnancy problem. The history is provided by the patient. A  was used ([id number]). Vaginal Bleeding   Pertinent negatives include no chest pain, no abdominal pain, no headaches and no shortness of breath. Pregnancy Problem    Pertinent negatives include no diarrhea, no nausea, no vomiting, no dysuria, no frequency, no hematuria, no headaches, no arthralgias, no chest pain and no back pain. No past medical history on file. No past surgical history on file. No family history on file. Social History     Social History    Marital status: SINGLE     Spouse name: N/A    Number of children: N/A    Years of education: N/A     Occupational History    Not on file.      Social History Main Topics    Smoking status: Never Smoker    Smokeless tobacco: Never Used    Alcohol use No    Drug use: No    Sexual activity: Not on file     Other Topics Concern    Not on file     Social History Narrative         ALLERGIES: Review of patient's allergies indicates no known allergies. Review of Systems   Constitutional: Negative. Negative for activity change, chills, fatigue and unexpected weight change. Respiratory: Negative for cough, chest tightness, shortness of breath and wheezing. Cardiovascular: Negative. Negative for chest pain and palpitations. Gastrointestinal: Negative. Negative for abdominal pain, diarrhea, nausea and vomiting. Genitourinary: Positive for pelvic pain (cramping) and vaginal bleeding. Negative for dysuria, flank pain, frequency and hematuria. Musculoskeletal: Negative. Negative for arthralgias, back pain, neck pain and neck stiffness. Skin: Negative. Negative for color change and rash. Neurological: Negative. Negative for dizziness, numbness and headaches. Psychiatric/Behavioral: Negative. Negative for confusion. All other systems reviewed and are negative. Vitals:    12/06/17 1711 12/06/17 1712 12/06/17 1715 12/06/17 1730   BP: 113/56  108/69 101/63   Pulse:       Resp:       Temp:       SpO2:  98% 98% 100%   Height:                Physical Exam   Constitutional: She is oriented to person, place, and time. She appears well-developed and well-nourished. She is active. Non-toxic appearance. No distress. Tearful   HENT:   Head: Normocephalic and atraumatic. Eyes: Conjunctivae are normal. Pupils are equal, round, and reactive to light. Right eye exhibits no discharge. Left eye exhibits no discharge. Neck: Normal range of motion and full passive range of motion without pain. Neck supple. No tracheal tenderness present. Cardiovascular: Normal rate, regular rhythm, normal heart sounds, intact distal pulses and normal pulses. Exam reveals no gallop and no friction rub. No murmur heard. Pulmonary/Chest: Effort normal and breath sounds normal. No respiratory distress. She has no wheezes. She has no rales. She exhibits no tenderness. Abdominal: Soft. Bowel sounds are normal. She exhibits no distension.  There is no tenderness. There is no rebound and no guarding. Genitourinary:   Genitourinary Comments: Normal external genitalia. Vagina- pink, moist without lesion. Cervix- pink, round with a closed cervical os. Moderate amount of dark red blood in vaginal vault and small clots. Musculoskeletal: Normal range of motion. She exhibits no edema or tenderness. Neurological: She is alert and oriented to person, place, and time. She has normal strength. No cranial nerve deficit or sensory deficit. Coordination normal.   Skin: Skin is warm, dry and intact. No abrasion and no rash noted. She is not diaphoretic. No erythema. Psychiatric: She has a normal mood and affect. Her speech is normal and behavior is normal. Cognition and memory are normal.   Nursing note and vitals reviewed. MDM  Number of Diagnoses or Management Options  Diagnosis management comments:   Ddx: IUP, threatened miscarriage, ectopic       Amount and/or Complexity of Data Reviewed  Clinical lab tests: ordered and reviewed  Tests in the radiology section of CPT®: ordered and reviewed  Review and summarize past medical records: yes  Discuss the patient with other providers: yes    Patient Progress  Patient progress: stable    ED Course       Procedures    I discussed patient's PMH, exam findings as well as careplan with the ER attending who agrees with care plan. Manford Buerger, PA-C       Procedure Note - Pelvic Exam:    5:52 PM  Performed by: Manford Buerger, PA-C  Chaperoned by: Hyacinth Mcburney, RN  Pelvic exam was performed using bimanual and speculum. Further findings noted in physical exam.   The procedure took 1-15 minutes, and pt tolerated well. DISCHARGE NOTE:  7:37 PM  The patient's results have been reviewed with them and/or available family.  Patient and/or family verbally conveyed their understanding and agreement of the patient's signs, symptoms, diagnosis, treatment and prognosis and additionally agree to follow up as recommended in the discharge instructions or to return to the Emergency Room should their condition change prior to their follow-up appointment. The patient/family verbally agrees with the care-plan and verbally conveys that all of their questions have been answered. The discharge instructions have also been provided to the patient and/or family with some educational information regarding the patient's diagnosis as well a list of reasons why the patient would want to return to the ER prior to their follow-up appointment, should their condition change. Plan:  1. F/U with OB  2. Pelvic rest and oral hydration encouraged; continue prenatal vitamins for iron supplementation due to bleeding  3.  Return precautions discussed and advised to return to ER if worse

## 2017-12-07 NOTE — DISCHARGE INSTRUCTIONS
Aborto espontáneo: Instrucciones de cuidado - [ Miscarriage: Care Instructions ]  Instrucciones de cuidado    Perder un embarazo puede ser muy difícil. [place] se pregunte por qué sucedió, o se eche la culpa. Los abortos espontáneos (naturales) son comunes y no son causados por el ejercicio, el [address]. La mayoría ocurren debido a que el óvulo fecundado en el útero no se desarrolla de manera normal.  No existe ningún tratamiento que pueda detener un aborto espontáneo. Mientras no tenga [facility] gran pérdida de [place], [facility], debilidad ni otras señales de [facility], puede dejar que un aborto espontáneo siga reveles propio curso. [place] puede tardar varios días. Reveles cuerpo se recuperará a lo hayde de las [employer]. Tener un aborto espontáneo no significa que no pueda tener un embarazo normal en el futuro. El médico la glynn examinado minuciosamente, meera pueden desarrollarse problemas más tarde. Si nota algún problema o nuevos síntomas, busque tratamiento médico de inmediato. La atención de seguimiento es tami parte clave de reveles tratamiento y seguridad. Asegúrese de hacer y acudir a todas las citas, y llame a reveles médico si está teniendo problemas. También es tami buena idea saber los resultados de los exámenes y mantener tami lista de los medicamentos que chang. ¿Cómo puede cuidarse en el hogar? · Quizás tenga algo de sangrado vaginal por 1 o 2 semanas. Puede que sea similar o un poco más intenso que el período menstrual habitual. Debería tener menos sangrado después de [place]. Use toallas sanitarias en lugar de tampones. Puede usar tampones manju el siguiente período menstrual, el cual debería llegar al cabo de 3 a 6 semanas. · [place] un analgésico (medicamento para el dolor) de venta mona, erik acetaminofén (Tylenol), ibuprofeno (Advil, Motrin) o naproxeno (Aleve) para los cólicos. [place] y siga todas las instrucciones de la [place].  Quizás sienta cólicos por varios días después del aborto espontáneo. · No tome dos o más analgésicos al mismo tiempo a menos que el médico se lo haya indicado. Muchos analgésicos contienen acetaminofén, lo cual es Tylenol. El exceso de acetaminofén (Tylenol) puede ser dañino. · Use un recipiente transparente para almacenar todo tejido que expulse. Llévelo al consultorio del médico tan pronto erik pueda. · No tenga relaciones sexuales hasta que deje de sangrar. · Puede volver a monica actividades normales si se siente lo suficientemente niall erik para hacerlo. [place] debe evitar hacer ejercicio arduo hasta que deje de sangrar. · Si planea volver a quedar embarazada, hable con reveles médico. La mayoría de los médicos recomiendan esperar hasta que haya tenido al menos un período menstrual normal antes de intentar [place]. · Si no desea quedar embarazada, pregúntele a reveles médico sobre los métodos anticonceptivos. Puede volver a quedar embarazada antes de que comience reveles siguiente menstruación si no está utilizando algún [employer]. · Quizás tenga un nivel bajo de bhupinder por la pérdida de [place]. Siga taim dieta equilibrada que sea uma en bhupinder y vitamina C. Los alimentos ricos en bhupinder incluyen las nadya hernandez, los [place], los [place], los frijoles y las verduras de hojas verdes. Los alimentos con alto contenido de vitamina C [place] cítricos, los tomates y el brócoli. Hable con reveles médico sobre si usted debe racquel pastillas de bhupinder o un multivitamínico.  · Perder un embarazo puede ser muy difícil. Quizás se pregunte por qué sucedió y se culpe a sí misma. Hablar con familiares, amigos, un asesor profesional o reveles médico puede ayudarle a sobrellevar la pérdida. ¿Cuándo debe pedir ayuda? Llame a reveles médico ahora mismo o busque atención médica inmediata si:  ? · Tiene sangrado vaginal intenso. [place] coágulos de [place] y empapa tami toalla sanitaria cada hora, manju 4 o más horas.    ? · El sangrado vaginal parece intensificarse, o elimina coágulos de [employer] que el tamaño de tami pelota de golf por más de 4 horas. ? · Vomita o no puede retener líquidos en el estómago. ? · Tiene un dolor abdominal nuevo o más intenso. ? · Tiene fiebre. ? · Tiene señales de infección, erik dolor de daryl, lemuel musculares o [employer]. Aun si no tiene fiebre, podría tener tami infección grave. ? · Tiene flujo vaginal que ha aumentado y que tiene un [employer]. ?Preste especial atención a los cambios en reveles jv y asegúrese de comunicarse con reveles médico si:  ? · Usted no tiene sangrado. [place] podría significar que los medicamentos no están funcionando. ? · El sangrado dura más de 2 semanas. ? · Tiene nuevos síntomas que podrían ser causados [place]. ? · Tiene sentimientos de tristeza y odonnell que interfieren en reveles george diaria. Usted podría necesitar medicamentos o asesoría psicológica para tratar la depresión. ¿Dónde puede encontrar más información en inglés? Mariana Becerril a http://thea-kaylee.info/. Escriba E802 en la búsqueda para aprender más acerca de \"Aborto espontáneo: Instrucciones de cuidado - [ Miscarriage: Care Instructions ]. \"  Revisado: 16 marzo, 2017  Versión del contenido: 11.4  © 0658-8174 Healthwise, Incorporated. Las instrucciones de cuidado fueron adaptadas bajo licencia por [employer] (which disclaims liability or warranty for this information). Si usted tiene [place] afección médica o sobre estas instrucciones, siempre pregunte a reveles profesional de jv. Healthwise, Incorporated niega toda garantía o responsabilidad por reveles uso de esta información.

## 2017-12-17 ENCOUNTER — HOSPITAL ENCOUNTER (EMERGENCY)
Age: 22
Discharge: HOME | End: 2017-12-17
Attending: EMERGENCY MEDICINE
Payer: SELF-PAY

## 2017-12-17 VITALS
OXYGEN SATURATION: 99 % | HEART RATE: 87 BPM | DIASTOLIC BLOOD PRESSURE: 75 MMHG | WEIGHT: 145 LBS | RESPIRATION RATE: 16 BRPM | TEMPERATURE: 98 F | BODY MASS INDEX: 24.13 KG/M2 | SYSTOLIC BLOOD PRESSURE: 123 MMHG

## 2017-12-17 DIAGNOSIS — Z32.01 PREGNANCY TEST PERFORMED, PREGNANCY CONFIRMED: Primary | ICD-10-CM

## 2017-12-17 LAB
HCG SERPL-ACNC: 26 MIU/ML (ref 0–6)
HCG UR QL: NEGATIVE
HCG UR QL: POSITIVE

## 2017-12-17 PROCEDURE — 99284 EMERGENCY DEPT VISIT MOD MDM: CPT

## 2017-12-17 PROCEDURE — 81025 URINE PREGNANCY TEST: CPT

## 2017-12-17 PROCEDURE — 84702 CHORIONIC GONADOTROPIN TEST: CPT | Performed by: PHYSICIAN ASSISTANT

## 2017-12-17 PROCEDURE — 36415 COLL VENOUS BLD VENIPUNCTURE: CPT | Performed by: PHYSICIAN ASSISTANT

## 2017-12-17 NOTE — ED PROVIDER NOTES
HPI Comments: Yuliet Deng is a 20 y.o. female  who presents by private vehicle to ER with c/o Patient presents with:  Pregnancy Test. Patient presents with positive pregnancy test at home yesterday. Patient is here to confirm. Patient denies any complaints, denies nausea, vomiting, vaginal bleeding, discharge or lower abdominal pain. Patient had a miscarriage earlier this month. Patient reports having her period last week. She specifically denies any fevers, chills, nausea, vomiting, chest pain, shortness of breath, headache, rash, diarrhea, abdominal pain, urinary/bowel changes, sweating or weight loss. PCP: None   PMHx significant for: No past medical history on file. PSHx significant for: No past surgical history on file. Social Hx: Tobacco use: Smoking status: Never Smoker                                                              Smokeless status: Never Used                      ; EtOH use: The patient states she drinks 0 per week.; Illicit Drug use: Allergies:  No Known Allergies    There are no other complaints, changes or physical findings at this time. Patient is a 20 y.o. female presenting with pregnancy test. The history is provided by the patient. Pregnancy Test    This is a new problem. The current episode started yesterday. The problem occurs constantly. The problem has not changed since onset. The patient is experiencing no pain. Pertinent negatives include no anorexia, no fever, no belching, no diarrhea, no flatus, no hematochezia, no melena, no nausea, no vomiting, no constipation, no dysuria, no frequency, no hematuria, no headaches, no arthralgias, no myalgias, no trauma, no chest pain, no testicular pain and no back pain. Nothing worsens the pain. The pain is relieved by nothing. Past workup includes no CT scan, no ultrasound, no surgery, no esophagogastroduodenoscopy, no UGI, no colonoscopy and no barium enema. The patient's surgical history non-contributory. No past medical history on file. No past surgical history on file. No family history on file. Social History     Social History    Marital status: SINGLE     Spouse name: N/A    Number of children: N/A    Years of education: N/A     Occupational History    Not on file. Social History Main Topics    Smoking status: Never Smoker    Smokeless tobacco: Never Used    Alcohol use No    Drug use: No    Sexual activity: Not on file     Other Topics Concern    Not on file     Social History Narrative         ALLERGIES: Review of patient's allergies indicates no known allergies. Review of Systems   Constitutional: Negative. Negative for fever. HENT: Negative. Eyes: Negative. Respiratory: Negative. Cardiovascular: Negative. Negative for chest pain. Gastrointestinal: Negative. Negative for anorexia, constipation, diarrhea, flatus, hematochezia, melena, nausea and vomiting. Endocrine: Negative. Genitourinary: Negative. Negative for dysuria, frequency, hematuria and testicular pain. Musculoskeletal: Negative. Negative for arthralgias, back pain and myalgias. Skin: Negative. Allergic/Immunologic: Negative. Neurological: Negative. Negative for headaches. Hematological: Negative. Psychiatric/Behavioral: Negative. All other systems reviewed and are negative. Vitals:    12/17/17 1534   BP: 123/75   Pulse: 87   Resp: 16   Temp: 98 °F (36.7 °C)   SpO2: 99%   Weight: 65.8 kg (145 lb)            Physical Exam   Constitutional: She is oriented to person, place, and time. She appears well-developed. HENT:   Head: Normocephalic and atraumatic. Right Ear: External ear normal.   Left Ear: External ear normal.   Nose: Nose normal.   Mouth/Throat: Oropharynx is clear and moist. No oropharyngeal exudate. Eyes: Conjunctivae, EOM and lids are normal. Right eye exhibits no discharge. Left eye exhibits no discharge. Neck: Normal range of motion.  No tracheal deviation present. No thyromegaly present. Cardiovascular: Normal rate, regular rhythm, normal heart sounds and intact distal pulses. Pulmonary/Chest: Effort normal and breath sounds normal.   Abdominal: Soft. Normal appearance and bowel sounds are normal.   Musculoskeletal: Normal range of motion. Neurological: She is alert and oriented to person, place, and time. Skin: Skin is warm and dry. Psychiatric: She has a normal mood and affect. Judgment normal.        MDM  Number of Diagnoses or Management Options  Pregnancy test performed, pregnancy confirmed:   Diagnosis management comments: Assesment/Plan- 20 y.o. Patient presents with:  Pregnancy Test  differential includes: pregnancy vs no pregnancy. Labs reviewed with Urine pregnancy initially appeared negative, blood hcg was 26. Patient had miscarriage and hcg on 12/6 was in 16,000 range. Discussed with patient that she needs to have levels re-checked in 48 hours to see if they are going up or down. Recommend ob-gyn follow up. Patient educated on reasons to return to the ED.          Amount and/or Complexity of Data Reviewed  Clinical lab tests: reviewed and ordered      ED Course       Procedures

## 2017-12-17 NOTE — ED TRIAGE NOTES
Pt reports she had a positive pregnancy test at home and is here to ensure she is pregnant. Denies vaginal bleeding, discharge, abdominal cramping or any other complaints.

## 2017-12-17 NOTE — DISCHARGE INSTRUCTIONS
We hope that we have addressed all of your medical concerns. The examination and treatment you received in the Emergency Department were for an emergent problem and were not intended as complete care. It is important that you follow up with your healthcare provider(s) for ongoing care. If your symptoms worsen or do not improve as expected, and you are unable to reach your usual health care provider(s), you should return to the Emergency Department. Today's healthcare is undergoing tremendous change, and patient satisfaction surveys are one of the many tools to assess the quality of medical care. You may receive a survey from the [employer] regarding your experience in the Emergency Department. I hope that your experience has been completely positive, particularly the medical care that I provided. As such, please participate in the survey; anything less than excellent does not meet my expectations or intentions. [facility] and [address] [place] [address] participate in nationally recognized quality of care measures. If your blood pressure is greater than 120/80, as reported below, we urge that you seek medical care to address the potential of high blood pressure, commonly known as hypertension. Hypertension can be hereditary or can be caused by certain medical conditions, pain, stress, or \"white coat syndrome. \"       Please make an appointment with your health care provider(s) for follow up of your Emergency Department visit. VITALS:   Patient Vitals for the past 8 hrs:   Temp Pulse Resp BP SpO2   12/17/17 1534 98 °F (36.7 °C) 87 16 123/75 99 %          Thank you for allowing us to provide you with medical care today. We realize that you have many choices for your emergency care needs. Please choose us in the future for any continued health care needs. Paolo Rodrigez  [address] [place] [address] [address]   Office: [phone]            No results found for this or any previous visit (from the past 24 hour(s)). No results found.

## 2018-10-20 ENCOUNTER — APPOINTMENT (OUTPATIENT)
Dept: ULTRASOUND IMAGING | Age: 23
End: 2018-10-20
Attending: PHYSICIAN ASSISTANT
Payer: SELF-PAY

## 2018-10-20 ENCOUNTER — HOSPITAL ENCOUNTER (EMERGENCY)
Age: 23
Discharge: HOME | End: 2018-10-20
Attending: EMERGENCY MEDICINE
Payer: SELF-PAY

## 2018-10-20 VITALS
SYSTOLIC BLOOD PRESSURE: 120 MMHG | TEMPERATURE: 97.8 F | WEIGHT: 157.6 LBS | BODY MASS INDEX: 25.33 KG/M2 | HEIGHT: 66 IN | DIASTOLIC BLOOD PRESSURE: 81 MMHG | OXYGEN SATURATION: 100 % | HEART RATE: 78 BPM | RESPIRATION RATE: 16 BRPM

## 2018-10-20 DIAGNOSIS — B96.89 BV (BACTERIAL VAGINOSIS): ICD-10-CM

## 2018-10-20 DIAGNOSIS — N76.0 BV (BACTERIAL VAGINOSIS): ICD-10-CM

## 2018-10-20 DIAGNOSIS — N30.00 ACUTE CYSTITIS WITHOUT HEMATURIA: ICD-10-CM

## 2018-10-20 DIAGNOSIS — R10.2 PELVIC PAIN: Primary | ICD-10-CM

## 2018-10-20 LAB
ANION GAP SERPL CALC-SCNC: 10 MMOL/L (ref 5–15)
APPEARANCE UR: ABNORMAL
BACTERIA URNS QL MICRO: ABNORMAL /HPF
BASOPHILS # BLD: 0.1 K/UL (ref 0–0.1)
BASOPHILS NFR BLD: 1 % (ref 0–1)
BILIRUB UR QL: NEGATIVE
BUN SERPL-MCNC: 10 MG/DL (ref 6–20)
BUN/CREAT SERPL: 15 (ref 12–20)
CALCIUM SERPL-MCNC: 8.6 MG/DL (ref 8.5–10.1)
CHLORIDE SERPL-SCNC: 103 MMOL/L (ref 97–108)
CLUE CELLS VAG QL WET PREP: NORMAL
CO2 SERPL-SCNC: 25 MMOL/L (ref 21–32)
COLOR UR: ABNORMAL
COMMENT, HOLDF: NORMAL
CREAT SERPL-MCNC: 0.68 MG/DL (ref 0.55–1.02)
DIFFERENTIAL METHOD BLD: ABNORMAL
EOSINOPHIL # BLD: 0.1 K/UL (ref 0–0.4)
EOSINOPHIL NFR BLD: 1 % (ref 0–7)
EPITH CASTS URNS QL MICRO: ABNORMAL /LPF
ERYTHROCYTE [DISTWIDTH] IN BLOOD BY AUTOMATED COUNT: 15.9 % (ref 11.5–14.5)
GLUCOSE SERPL-MCNC: 83 MG/DL (ref 65–100)
GLUCOSE UR STRIP.AUTO-MCNC: NEGATIVE MG/DL
HCG SERPL-ACNC: <1 MIU/ML (ref 0–6)
HCG UR QL: NEGATIVE
HCT VFR BLD AUTO: 35.2 % (ref 35–47)
HGB BLD-MCNC: 11.5 G/DL (ref 11.5–16)
HGB UR QL STRIP: NEGATIVE
IMM GRANULOCYTES # BLD: 0 K/UL (ref 0–0.04)
IMM GRANULOCYTES NFR BLD AUTO: 0 % (ref 0–0.5)
KETONES UR QL STRIP.AUTO: NEGATIVE MG/DL
KOH PREP SPEC: NORMAL
LEUKOCYTE ESTERASE UR QL STRIP.AUTO: ABNORMAL
LYMPHOCYTES # BLD: 2.6 K/UL (ref 0.8–3.5)
LYMPHOCYTES NFR BLD: 27 % (ref 12–49)
MCH RBC QN AUTO: 28.5 PG (ref 26–34)
MCHC RBC AUTO-ENTMCNC: 32.7 G/DL (ref 30–36.5)
MCV RBC AUTO: 87.1 FL (ref 80–99)
MONOCYTES # BLD: 0.8 K/UL (ref 0–1)
MONOCYTES NFR BLD: 8 % (ref 5–13)
NEUTS SEG # BLD: 6.1 K/UL (ref 1.8–8)
NEUTS SEG NFR BLD: 63 % (ref 32–75)
NITRITE UR QL STRIP.AUTO: NEGATIVE
NRBC # BLD: 0 K/UL (ref 0–0.01)
NRBC BLD-RTO: 0 PER 100 WBC
PH UR STRIP: 5.5 [PH] (ref 5–8)
PLATELET # BLD AUTO: 262 K/UL (ref 150–400)
PMV BLD AUTO: 11.4 FL (ref 8.9–12.9)
POTASSIUM SERPL-SCNC: 3.7 MMOL/L (ref 3.5–5.1)
PROT UR STRIP-MCNC: NEGATIVE MG/DL
RBC # BLD AUTO: 4.04 M/UL (ref 3.8–5.2)
RBC #/AREA URNS HPF: ABNORMAL /HPF (ref 0–5)
SAMPLES BEING HELD,HOLD: NORMAL
SERVICE CMNT-IMP: NORMAL
SODIUM SERPL-SCNC: 138 MMOL/L (ref 136–145)
SP GR UR REFRACTOMETRY: 1.03 (ref 1–1.03)
T VAGINALIS VAG QL WET PREP: NORMAL
UR CULT HOLD, URHOLD: NORMAL
UROBILINOGEN UR QL STRIP.AUTO: 1 EU/DL (ref 0.2–1)
WBC # BLD AUTO: 9.7 K/UL (ref 3.6–11)
WBC URNS QL MICRO: ABNORMAL /HPF (ref 0–4)

## 2018-10-20 PROCEDURE — 96361 HYDRATE IV INFUSION ADD-ON: CPT

## 2018-10-20 PROCEDURE — 87491 CHLMYD TRACH DNA AMP PROBE: CPT | Performed by: PHYSICIAN ASSISTANT

## 2018-10-20 PROCEDURE — 96372 THER/PROPH/DIAG INJ SC/IM: CPT

## 2018-10-20 PROCEDURE — 99284 EMERGENCY DEPT VISIT MOD MDM: CPT

## 2018-10-20 PROCEDURE — 85025 COMPLETE CBC W/AUTO DIFF WBC: CPT | Performed by: PHYSICIAN ASSISTANT

## 2018-10-20 PROCEDURE — 87210 SMEAR WET MOUNT SALINE/INK: CPT | Performed by: PHYSICIAN ASSISTANT

## 2018-10-20 PROCEDURE — 84702 CHORIONIC GONADOTROPIN TEST: CPT | Performed by: PHYSICIAN ASSISTANT

## 2018-10-20 PROCEDURE — 74011250636 HC RX REV CODE- 250/636: Performed by: PHYSICIAN ASSISTANT

## 2018-10-20 PROCEDURE — 74011250637 HC RX REV CODE- 250/637: Performed by: PHYSICIAN ASSISTANT

## 2018-10-20 PROCEDURE — 96374 THER/PROPH/DIAG INJ IV PUSH: CPT

## 2018-10-20 PROCEDURE — 80048 BASIC METABOLIC PNL TOTAL CA: CPT | Performed by: PHYSICIAN ASSISTANT

## 2018-10-20 PROCEDURE — 76830 TRANSVAGINAL US NON-OB: CPT

## 2018-10-20 PROCEDURE — 76856 US EXAM PELVIC COMPLETE: CPT

## 2018-10-20 PROCEDURE — 87086 URINE CULTURE/COLONY COUNT: CPT | Performed by: PHYSICIAN ASSISTANT

## 2018-10-20 PROCEDURE — 81025 URINE PREGNANCY TEST: CPT

## 2018-10-20 PROCEDURE — 81001 URINALYSIS AUTO W/SCOPE: CPT | Performed by: EMERGENCY MEDICINE

## 2018-10-20 PROCEDURE — 36415 COLL VENOUS BLD VENIPUNCTURE: CPT | Performed by: PHYSICIAN ASSISTANT

## 2018-10-20 RX ORDER — KETOROLAC TROMETHAMINE 30 MG/ML
15 INJECTION, SOLUTION INTRAMUSCULAR; INTRAVENOUS
Status: COMPLETED | OUTPATIENT
Start: 2018-10-20 | End: 2018-10-20

## 2018-10-20 RX ORDER — PROMETHAZINE HYDROCHLORIDE 25 MG/1
25 TABLET ORAL
Qty: 12 TAB | Refills: 0 | Status: SHIPPED | OUTPATIENT
Start: 2018-10-20

## 2018-10-20 RX ORDER — CEPHALEXIN 500 MG/1
500 CAPSULE ORAL 2 TIMES DAILY
Qty: 14 CAP | Refills: 0 | Status: SHIPPED | OUTPATIENT
Start: 2018-10-20 | End: 2018-10-27

## 2018-10-20 RX ORDER — AZITHROMYCIN 250 MG/1
1000 TABLET, FILM COATED ORAL
Status: COMPLETED | OUTPATIENT
Start: 2018-10-20 | End: 2018-10-20

## 2018-10-20 RX ORDER — ONDANSETRON 4 MG/1
4 TABLET, ORALLY DISINTEGRATING ORAL
Status: COMPLETED | OUTPATIENT
Start: 2018-10-20 | End: 2018-10-20

## 2018-10-20 RX ORDER — METRONIDAZOLE 500 MG/1
500 TABLET ORAL 2 TIMES DAILY
Qty: 14 TAB | Refills: 0 | Status: SHIPPED | OUTPATIENT
Start: 2018-10-20 | End: 2018-10-27

## 2018-10-20 RX ADMIN — KETOROLAC TROMETHAMINE 15 MG: 30 INJECTION, SOLUTION INTRAMUSCULAR; INTRAVENOUS at 17:45

## 2018-10-20 RX ADMIN — ONDANSETRON 4 MG: 4 TABLET, ORALLY DISINTEGRATING ORAL at 17:46

## 2018-10-20 RX ADMIN — LIDOCAINE HYDROCHLORIDE 250 MG: 10 INJECTION, SOLUTION EPIDURAL; INFILTRATION; INTRACAUDAL; PERINEURAL at 19:21

## 2018-10-20 RX ADMIN — SODIUM CHLORIDE 1000 ML: 900 INJECTION, SOLUTION INTRAVENOUS at 17:45

## 2018-10-20 RX ADMIN — AZITHROMYCIN 1000 MG: 250 TABLET, FILM COATED ORAL at 19:21

## 2018-10-20 NOTE — ED NOTES
Dischage instructions reviewed by provider using  phone. [email or web address]. Pt ambulatory from ED with steady gait.

## 2018-10-20 NOTE — DISCHARGE INSTRUCTIONS
- return for new or worsening symptoms  - finish medications  - make a follow up with primary care or your gynecologist     Vaginosis bacteriana: Instrucciones de cuidado - [ Bacterial Vaginosis: Care Instructions ]  Instrucciones de cuidado    La vaginosis bacteriana es un tipo de infección vaginal. Es causada por el crecimiento excesivo de ciertas bacterias que se encuentran normalmente en la vagina. Entre los síntomas se incluyen comezón, hinchazón, dolor al orinar o tener relaciones sexuales, y flujo grisáceo o amarillento con olor a pescado. No se considera tami infección que se transmita por contacto sexual.  Aunque los síntomas pueden ser molestos e incómodos, la vaginosis bacteriana no suele causar otros problemas de [facility]. Sin embargo, puede causar complicaciones si la tiene mientras está [place]. Si niall la infección podría desaparecer por sí janie, la mayoría de los médicos utilizan antibióticos para reveles tratamiento. Es posible que le hayan recetado pastillas o cremas vaginales. Con tratamiento, la vaginosis bacteriana suele desaparecer al cabo de 5 a 7 días. La atención de seguimiento es tami parte clave de reveles tratamiento y seguridad. Asegúrese de hacer y acudir a todas las citas, y llame a reveles médico si está teniendo problemas. También es tami buena idea saber los resultados de monica exámenes y mantener tami lista de los medicamentos que chang. ¿Cómo puede cuidarse en el hogar? · [address] No deje de tomarlos por el hecho de sentirse mejor. Debe racquel todos los antibióticos hasta terminarlos. · Si está tomando metronidazol (Flagyl), no coma ni mike nada que contenga alcohol. · Siga utilizando los medicamentos aunque comience el período menstrual. Utilice toallas sanitarias en lugar de tampones manju el tiempo que utilice la crema vaginal o supositorios. Los tampones pueden absorber el medicamento. · Use ropa holgada de algodón.  No utilice nylon ni otros materiales que conserven el calor corporal y la humedad cerca de la piel. · No se rasque. Alivie la comezón con tami compresa fría o un baño frío. · No se lave la elodia vaginal más de tami vez al día. Use solo agua corriente o un Tamika Corey y sin perfume. No use lavados vaginales. ¿Cuándo debe pedir ayuda? Preste especial atención a los cambios en reveles jv y asegúrese de comunicarse con reveles médico si:    · Tiene sangrado vaginal inesperado.     · Tiene fiebre.     · Tiene mayor dolor o dolor nuevo en la vagina o la pelvis.     · No mejora después de 1 semana.     · Bonnie síntomas regresan después de que termina bonnie medicamentos. ¿Dónde puede encontrar más información en inglés? Manuel  a http://thea-kaylee.info/. Reynaldo Primes X066 en la búsqueda para aprender más acerca de \"Vaginosis bacteriana: Instrucciones de cuidado - [ Bacterial Vaginosis: Care Instructions ]. \"  Revisado: 15 mayo, 2018  Versión del contenido: 11.8  © 5853-4610 Healthwise, Incorporated. Las instrucciones de cuidado fueron adaptadas bajo licencia por [employer] (which disclaims liability or warranty for this information). Si usted tiene [place] afección médica o sobre estas instrucciones, siempre pregunte a reveles profesional de jv. Healthwise, Incorporated niega toda garantía o responsabilidad por reveles uso de esta información. Dolor pélvico: Instrucciones de cuidado - [ Pelvic Pain: Care Instructions ]  Instrucciones de cuidado    El dolor pélvico, o dolor en la parte baja del abdomen, puede tener muchas causas. Con frecuencia, el dolor pélvico no es grave y mejora en unos días. Si el dolor continúa o [place], es posible que necesite exámenes y [place]. Hable con reveles médico sobre cualquier síntoma nuevo. Podrían ser señales de un problema grave. La atención de seguimiento es tami parte clave de reveles tratamiento y seguridad.  Asegúrese de hacer y acudir a todas las citas, y llame a reveles médico si está teniendo problemas. También es tami buena idea saber los resultados de monica exámenes y mantener tami lista de los medicamentos que chang. ¿Cómo puede cuidarse en el hogar? · Descanse hasta que se sienta mejor. Acuéstese y ponga tami almohada debajo de las rodillas para [address] piernas. · Danae abundantes líquidos. Podría descubrir que si chang sorbos pequeños y frecuentes caen mejor al estómago que si silva tami gran cantidad a la vez. Evite las bebidas carbonatadas o que contengan cafeína, erik las sodas, el té o el café. · Intente comer varias comidas pequeñas, en lugar de 2 o 3 abundantes. Coma alimentos suaves, erik arroz, pan andrea seco o galletas saladas, bananas (plátanos) y puré de [place]. Evite las comidas grasosas y condimentadas, otras frutas y el alcohol hasta 50 horas después de que desaparezcan los síntomas. · Vivian un analgésico (medicamento para el dolor) de venta mona, erik acetaminofén (Tylenol), ibuprofeno (Advil, Motrin) o naproxeno (Aleve). Roxann y siga todas las instrucciones de la [place]. · No tome dos o más analgésicos al [employer], a menos que el médico se lo haya indicado. Muchos analgésicos contienen acetaminofén, es decir, Tylenol. El exceso de acetaminofén (Tylenol) puede ser dañino. · Puede colocarse tami almohadilla térmica, un paño tibio o calor húmedo sobre el abdomen para aliviar el dolor. ¿Cuándo debes pedir ayuda? Llama a tu médico ahora mismo o busca atención médica inmediata si:    · Vuelves a tener fiebre o tienes fiebre más naz.     · Tu dolor empeora.     · Piensas que puedes estar embarazada.    Presta especial atención a los cambios en tu jv y asegúrate de comunicarte con tu médico si:    · Vomitas o tienes diarrea.     · No mejoras después de 2 días. ¿Dónde puede encontrar más información en inglés? Keenan Hummel a http://thea-kaylee.info/.   Marisela Landis [id number] en la búsqueda para aprender más acerca de \"Dolor pélvico: Instrucciones de cuidado - [ Pelvic Pain: Care Instructions ]. \"  Revisado: 15 [place], 2018  Versión del contenido: 11.8  © 1017-9528 Healthwise, Incorporated. Las instrucciones de cuidado fueron adaptadas bajo licencia por Good Help Connections (which disclaims liability or warranty for this information). Si usted tiene [place] afección médica o sobre estas instrucciones, siempre pregunte a reveles profesional de jv. Healthwise, Incorporated niega toda garantía o responsabilidad por reveles uso de esta información. Infección urinaria en las mujeres: Instrucciones de cuidado - [ Urinary Tract Infection in Women: Care Instructions ]  Instrucciones de cuidado    Tami infección urinaria (UTI, por monica siglas en inglés) es un término general que hace referencia a tami infección que se produce en cualquier parte entre los riñones y la uretra (conducto por el cual se expulsa la orina). La mayoría de las UTI son infecciones de la vejiga. Con frecuencia, causan dolor o ardor al [place]. Las UTI son causadas por bacterias y pueden curarse con antibióticos. Asegúrese de completar el tratamiento para que la infección desaparezca. La atención de seguimiento es tami parte clave de reveles tratamiento y seguridad. Asegúrese de hacer y acudir a todas las citas, y llame a reveles médico si está teniendo problemas. También es tami buena idea saber los resultados de monica exámenes y mantener tami lista de los medicamentos que chang. ¿Cómo puede cuidarse en el hogar? · [address] No deje de tomarlos por el hecho de sentirse mejor. Debe racquel todos los antibióticos hasta terminarlos. · Quiana los próximos rani o [address], mike mayor cantidad de [place] y otros líquidos. [place] puede ayudar a eliminar las bacterias que provocan la infección. (Si tiene tami enfermedad de los riñones, el corazón o el hígado y tiene que [place] líquidos, hable con reveles médico antes de aumentar reveles consumo). · Evite las bebidas gaseosas o con cafeína.  Pueden irritar la vejiga. · Orine con frecuencia. Trate de vaciar la vejiga cada vez que orine. · Para aliviar el dolor, tome un baño caliente o colóquese tami almohadilla térmica a baja temperatura sobre la parte baja del abdomen o la elodia genital. Nunca se duerma mientras [place] tami almohadilla térmica. Para prevenir las infecciones urinarias  · Danae abundante agua todos los días. [place] la ayuda a orinar con frecuencia, lo que elimina las bacterias de reveles organismo. (Si tiene tami enfermedad de los riñones, el corazón o el hígado y tiene que [place]'s líquidos, hable con reveles médico antes de aumentar reveles consumo). · Orine cuando necesite hacerlo. · Orine inmediatamente después de liliana tenido [employer]. · Cámbiese las toallas sanitarias con frecuencia. · Evite el uso de lavados vaginales, los soy de burbujas, los [place] de higiene femenina y otros productos para la higiene femenina que contengan desodorantes. · Después de ir al baño, límpiese de adelante hacia atrás. ¿Cuándo debes pedir ayuda? Llama a tu médico ahora mismo o busca atención médica inmediata si:    · Aparecen síntomas erik fiebre, escalofríos, náuseas o vómitos por primera vez, o empeoran.     · Te empieza a doler la espalda, ced debajo de la caja torácica. A esto se le llama dolor en el flanco.     · Aparece rosalee o pus en la orina.     · Tienes problemas con los antibióticos.    Presta especial atención a los cambios en tu jv y asegúrate de comunicarte con tu médico si:    · No mejoras después de liliana tomado un antibiótico manju 2 días.     · Los síntomas desaparecen y Debi Koyanagi. ¿Dónde puede encontrar más información en inglés? Aldair Wood a http://thea-kaylee.info/. Barrington Fall [id number] en la búsqueda para aprender más acerca de \"Infección urinaria en las mujeres: Instrucciones de cuidado - [ Urinary Tract Infection in Women: Care Instructions ]. \"  Revisado: 21 marzo, 2018  Versión del contenido: 11.8  © 0698-2226 Healthwise, Incorporated. Las instrucciones de cuidado fueron adaptadas bajo licencia por Good Help Connections (which disclaims liability or warranty for this information). Si usted tiene [place] afección médica o sobre estas instrucciones, siempre pregunte a reveles profesional de jv. Healthwise, Incorporated niega toda garantía o responsabilidad por reveles uso de esta información.

## 2018-10-20 NOTE — ED TRIAGE NOTES
TRIAGE NOTE: Patient reports severe lower quadrant abdominal pain for 1.5 weeks, worse today. +vomiting & subjective fever. +Pain with urination

## 2018-10-20 NOTE — ED PROVIDER NOTES
20year old female presenting to the ED for abdominal pain. MIL at bedside reports about 10 days of lower abdominal pain, progressively worsening. Pt notes that pain is suprapubic in location, mainly midline but also somewhat right sided, constant, no modifying factors noted  No back pain. + subjective fever. + nausea and vomiting, about 2 times a day. No diarrhea, constipation, melena, hematochezia. No vaginal bleeding. No discharge. LMP: September 15th. Normal timing. Today had new onset of pain with urination. + frequency. Denies any other associated symptoms. PMHx: denies PSx: denies Social: drinks 3 or 4 days in a week. Non-smoker. History reviewed. No pertinent past medical history. History reviewed. No pertinent surgical history. History reviewed. No pertinent family history. Social History Socioeconomic History  Marital status: SINGLE Spouse name: Not on file  Number of children: Not on file  Years of education: Not on file  Highest education level: Not on file Social Needs  Financial resource strain: Not on file  Food insecurity - worry: Not on file  Food insecurity - inability: Not on file  Transportation needs - medical: Not on file  Transportation needs - non-medical: Not on file Occupational History  Not on file Tobacco Use  Smoking status: Never Smoker  Smokeless tobacco: Never Used Substance and Sexual Activity  Alcohol use: Yes Frequency: Never  Drug use: Not on file  Sexual activity: Not on file Other Topics Concern  Not on file Social History Narrative  Not on file ALLERGIES: Patient has no known allergies. Review of Systems Constitutional: Negative for fever. HENT: Negative for trouble swallowing. Eyes: Negative for visual disturbance. Respiratory: Negative for shortness of breath. Cardiovascular: Negative for leg swelling. Gastrointestinal: Positive for abdominal pain, nausea and vomiting. Genitourinary: Positive for dysuria, frequency and pelvic pain. Negative for vaginal bleeding and vaginal discharge. Musculoskeletal: Negative for neck stiffness. Skin: Negative for rash. Neurological: Negative for seizures. All other systems reviewed and are negative. Vitals:  
 10/20/18 1558 10/20/18 1949 BP: 110/80 120/81 Pulse: 99 78 Resp: 20 16 Temp: 97.8 °F (36.6 °C) 97.8 °F (36.6 °C) SpO2: 98% 100% Weight: 71.5 kg (157 lb 9.6 oz) Height: 5' 6\" (1.676 m) Physical Exam  
Constitutional: She appears well-developed and well-nourished. No distress. Well-appearing  female HENT:  
Right Ear: External ear normal.  
Left Ear: External ear normal.  
Eyes: Pupils are equal, round, and reactive to light. Neck: Normal range of motion. Neck supple. Cardiovascular: Normal rate, regular rhythm and normal heart sounds. Exam reveals no gallop and no friction rub. No murmur heard. Pulmonary/Chest: Effort normal and breath sounds normal. No respiratory distress. She has no wheezes. Abdominal: Soft. There is no tenderness. There is no guarding. Mild suprapubic TTP Genitourinary:  
Genitourinary Comments: Scant white discharge No CMT No bleeding Musculoskeletal: Normal range of motion. Neurological: She is alert. Skin: Skin is warm and dry. Psychiatric: She has a normal mood and affect. Nursing note and vitals reviewed. MDM Number of Diagnoses or Management Options Acute cystitis without hematuria:  
BV (bacterial vaginosis): Pelvic pain:  
Diagnosis management comments: 20year old female presenting for suprapubic pain, frequency, dysuria. Afebrile, reassuring labs.   No findings on US, no leukocytosis, fever, CMT, purulent discharge c/f PID, given that pt is sexually active, some discharge, pt treated empirically for gc/chlamydia, + clue cells, treated with keflex. UA somewhat c/f infection, cx pending, will treat with keflex. Amount and/or Complexity of Data Reviewed Clinical lab tests: ordered and reviewed Tests in the radiology section of CPT®: ordered and reviewed Discuss the patient with other providers: yes (Dr. Rupesh Dc, ED attending) Procedures Used  phone to discharge pt, answer all questions, discuss results, give return precautions.  
SHIV Torres 
7:55 PM

## 2018-10-22 LAB
BACTERIA SPEC CULT: NORMAL
C TRACH DNA SPEC QL NAA+PROBE: NEGATIVE
CC UR VC: NORMAL
N GONORRHOEA DNA SPEC QL NAA+PROBE: NEGATIVE
SAMPLE TYPE: NORMAL
SERVICE CMNT-IMP: NORMAL
SERVICE CMNT-IMP: NORMAL
SPECIMEN SOURCE: NORMAL

## 2019-12-30 ENCOUNTER — APPOINTMENT (OUTPATIENT)
Dept: CT IMAGING | Age: 24
End: 2019-12-30
Attending: EMERGENCY MEDICINE
Payer: SELF-PAY

## 2019-12-30 ENCOUNTER — APPOINTMENT (OUTPATIENT)
Dept: ULTRASOUND IMAGING | Age: 24
End: 2019-12-30
Attending: EMERGENCY MEDICINE
Payer: SELF-PAY

## 2019-12-30 ENCOUNTER — HOSPITAL ENCOUNTER (EMERGENCY)
Age: 24
Discharge: HOME | End: 2019-12-30
Attending: EMERGENCY MEDICINE
Payer: SELF-PAY

## 2019-12-30 VITALS
TEMPERATURE: 98.4 F | WEIGHT: 157 LBS | RESPIRATION RATE: 16 BRPM | HEIGHT: 66 IN | DIASTOLIC BLOOD PRESSURE: 66 MMHG | BODY MASS INDEX: 25.23 KG/M2 | OXYGEN SATURATION: 96 % | HEART RATE: 78 BPM | SYSTOLIC BLOOD PRESSURE: 112 MMHG

## 2019-12-30 DIAGNOSIS — R11.10 VOMITING, INTRACTABILITY OF VOMITING NOT SPECIFIED, PRESENCE OF NAUSEA NOT SPECIFIED, UNSPECIFIED VOMITING TYPE: Primary | ICD-10-CM

## 2019-12-30 DIAGNOSIS — R05.9 COUGH: ICD-10-CM

## 2019-12-30 LAB
ALBUMIN SERPL-MCNC: 3.7 G/DL (ref 3.5–5)
ALBUMIN/GLOB SERPL: 0.9 {RATIO} (ref 1.1–2.2)
ALP SERPL-CCNC: 132 U/L (ref 45–117)
ALT SERPL-CCNC: 94 U/L (ref 12–78)
ANION GAP SERPL CALC-SCNC: 5 MMOL/L (ref 5–15)
APPEARANCE UR: ABNORMAL
AST SERPL-CCNC: 54 U/L (ref 15–37)
BACTERIA URNS QL MICRO: NEGATIVE /HPF
BASOPHILS # BLD: 0.1 K/UL (ref 0–0.1)
BASOPHILS NFR BLD: 1 % (ref 0–1)
BILIRUB SERPL-MCNC: 0.3 MG/DL (ref 0.2–1)
BILIRUB UR QL: NEGATIVE
BUN SERPL-MCNC: 9 MG/DL (ref 6–20)
BUN/CREAT SERPL: 12 (ref 12–20)
CALCIUM SERPL-MCNC: 8.6 MG/DL (ref 8.5–10.1)
CHLORIDE SERPL-SCNC: 104 MMOL/L (ref 97–108)
CO2 SERPL-SCNC: 27 MMOL/L (ref 21–32)
COLOR UR: ABNORMAL
CREAT SERPL-MCNC: 0.76 MG/DL (ref 0.55–1.02)
DIFFERENTIAL METHOD BLD: NORMAL
EOSINOPHIL # BLD: 0.2 K/UL (ref 0–0.4)
EOSINOPHIL NFR BLD: 3 % (ref 0–7)
EPITH CASTS URNS QL MICRO: ABNORMAL /LPF
ERYTHROCYTE [DISTWIDTH] IN BLOOD BY AUTOMATED COUNT: 14 % (ref 11.5–14.5)
GLOBULIN SER CALC-MCNC: 4.1 G/DL (ref 2–4)
GLUCOSE SERPL-MCNC: 124 MG/DL (ref 65–100)
GLUCOSE UR STRIP.AUTO-MCNC: NEGATIVE MG/DL
HCG UR QL: NEGATIVE
HCT VFR BLD AUTO: 39.2 % (ref 35–47)
HGB BLD-MCNC: 13.4 G/DL (ref 11.5–16)
HGB UR QL STRIP: NEGATIVE
HYALINE CASTS URNS QL MICRO: ABNORMAL /LPF (ref 0–5)
IMM GRANULOCYTES # BLD AUTO: 0 K/UL (ref 0–0.04)
IMM GRANULOCYTES NFR BLD AUTO: 0 % (ref 0–0.5)
KETONES UR QL STRIP.AUTO: NEGATIVE MG/DL
LEUKOCYTE ESTERASE UR QL STRIP.AUTO: NEGATIVE
LYMPHOCYTES # BLD: 1.3 K/UL (ref 0.8–3.5)
LYMPHOCYTES NFR BLD: 20 % (ref 12–49)
MCH RBC QN AUTO: 30.3 PG (ref 26–34)
MCHC RBC AUTO-ENTMCNC: 34.2 G/DL (ref 30–36.5)
MCV RBC AUTO: 88.7 FL (ref 80–99)
MONOCYTES # BLD: 0.6 K/UL (ref 0–1)
MONOCYTES NFR BLD: 10 % (ref 5–13)
NEUTS SEG # BLD: 4.2 K/UL (ref 1.8–8)
NEUTS SEG NFR BLD: 66 % (ref 32–75)
NITRITE UR QL STRIP.AUTO: NEGATIVE
NRBC # BLD: 0 K/UL (ref 0–0.01)
NRBC BLD-RTO: 0 PER 100 WBC
PH UR STRIP: 6.5 [PH] (ref 5–8)
PLATELET # BLD AUTO: 240 K/UL (ref 150–400)
PMV BLD AUTO: 10.5 FL (ref 8.9–12.9)
POTASSIUM SERPL-SCNC: 3.5 MMOL/L (ref 3.5–5.1)
PROT SERPL-MCNC: 7.8 G/DL (ref 6.4–8.2)
PROT UR STRIP-MCNC: NEGATIVE MG/DL
RBC # BLD AUTO: 4.42 M/UL (ref 3.8–5.2)
RBC #/AREA URNS HPF: ABNORMAL /HPF (ref 0–5)
SODIUM SERPL-SCNC: 136 MMOL/L (ref 136–145)
SP GR UR REFRACTOMETRY: 1.03 (ref 1–1.03)
UR CULT HOLD, URHOLD: NORMAL
UROBILINOGEN UR QL STRIP.AUTO: 1 EU/DL (ref 0.2–1)
WBC # BLD AUTO: 6.3 K/UL (ref 3.6–11)
WBC URNS QL MICRO: ABNORMAL /HPF (ref 0–4)

## 2019-12-30 PROCEDURE — 81025 URINE PREGNANCY TEST: CPT

## 2019-12-30 PROCEDURE — 96361 HYDRATE IV INFUSION ADD-ON: CPT

## 2019-12-30 PROCEDURE — 36415 COLL VENOUS BLD VENIPUNCTURE: CPT

## 2019-12-30 PROCEDURE — 80053 COMPREHEN METABOLIC PANEL: CPT

## 2019-12-30 PROCEDURE — 76705 ECHO EXAM OF ABDOMEN: CPT

## 2019-12-30 PROCEDURE — 74011636320 HC RX REV CODE- 636/320: Performed by: RADIOLOGY

## 2019-12-30 PROCEDURE — 74011250636 HC RX REV CODE- 250/636: Performed by: EMERGENCY MEDICINE

## 2019-12-30 PROCEDURE — 99284 EMERGENCY DEPT VISIT MOD MDM: CPT

## 2019-12-30 PROCEDURE — 96374 THER/PROPH/DIAG INJ IV PUSH: CPT

## 2019-12-30 PROCEDURE — 74177 CT ABD & PELVIS W/CONTRAST: CPT

## 2019-12-30 PROCEDURE — 81001 URINALYSIS AUTO W/SCOPE: CPT

## 2019-12-30 PROCEDURE — 85025 COMPLETE CBC W/AUTO DIFF WBC: CPT

## 2019-12-30 RX ORDER — AZITHROMYCIN 250 MG/1
TABLET, FILM COATED ORAL
Qty: 6 TAB | Refills: 0 | Status: SHIPPED | OUTPATIENT
Start: 2019-12-30

## 2019-12-30 RX ORDER — ONDANSETRON 2 MG/ML
4 INJECTION INTRAMUSCULAR; INTRAVENOUS
Status: COMPLETED | OUTPATIENT
Start: 2019-12-30 | End: 2019-12-30

## 2019-12-30 RX ORDER — ONDANSETRON 4 MG/1
4 TABLET, ORALLY DISINTEGRATING ORAL
Qty: 10 TAB | Refills: 0 | Status: SHIPPED | OUTPATIENT
Start: 2019-12-30

## 2019-12-30 RX ADMIN — IOPAMIDOL 100 ML: 755 INJECTION, SOLUTION INTRAVENOUS at 19:07

## 2019-12-30 RX ADMIN — SODIUM CHLORIDE 1000 ML: 900 INJECTION, SOLUTION INTRAVENOUS at 18:43

## 2019-12-30 RX ADMIN — ONDANSETRON 4 MG: 2 INJECTION INTRAMUSCULAR; INTRAVENOUS at 18:43

## 2019-12-31 NOTE — DISCHARGE INSTRUCTIONS
Patient Education        Tos: Instrucciones de cuidado - [ Cough: Care Instructions ]  Instrucciones de cuidado    La tos es [place] de reveles cuerpo a algo que molesta en la garganta o las vías respiratorias. La tos puede ser provocada por muchas cosas. Usted podría toser debido a un resfriado o tami gripe, tami bronquitis o el asma. Fumar, el goteo posnasal, las alergias y el ácido estomacal que regresa a reveles garganta también pueden causar tos. La tos es un síntoma, no tami enfermedad. En la [employer], la tos cesa cuando desaparece la causa, erik un resfriado. Puede racquel algunas medidas en reveles hogar para toser menos y sentirse mejor. La atención de seguimiento es tami parte clave de reveles tratamiento y seguridad. Asegúrese de hacer y acudir a todas las citas, y llame a reveles médico si está teniendo problemas. También es tami buena idea saber los resultados de monica exámenes y mantener tami lista de los medicamentos que chang. ¿Cómo puede cuidarse en el hogar? · Danae abundante agua y otros líquidos. [place] ayuda a [employer] mucosidad sea menos espesa y [place] la garganta seca o adolorida. La miel o el jugo de cristopher en [place] o té podrían aliviar tami tos seca. · [employer] medicamentos para la tos según las indicaciones de reveles médico.  · Eleve la daryl sobre almohadas para ayudarle a respirar y aliviar la tos seca. · Pruebe pastillas para la tos para aliviar la garganta seca o adolorida. Las pastillas para la tos no detienen la tos. Las pastillas para la tos medicinales saborizadas no son mejores que las pastillas con sabor a marley o los caramelos duros. · No fume. Evite el humo de tabaco ambiental. Si necesita ayuda para dejar de fumar, hable con reveles médico sobre programas y medicamentos para dejar de fumar. Estos pueden aumentar monica probabilidades de dejar el hábito para siempre. ¿Cuándo debe pedir ayuda? Llame al 911 en cualquier momento que considere que necesita atención de [place].  Por ejemplo, llame si:    · Tiene graves dificultades para respirar.    Llame a reveles médico ahora mismo o busque atención médica inmediata si:    · Tose rosalee.     · Tiene nuevas dificultades para respirar o estas empeoran.     · Tiene fiebre nueva o más naz.     · Tiene un salpullido nuevo.    Preste especial atención a los cambios en reveles jv y asegúrese de comunicarse con reveles médico si:    · Reveles tos es más profunda o más frecuente que antes, especialmente si nota más mucosidad o un cambio en el color de la mucosidad.     · Tiene nuevos síntomas, erik dolor de garganta, dolor de oídos o dolor en los senos paranasales.     · No mejora erik se esperaba. ¿Dónde puede encontrar más información en inglés? Junior Camacho a http://thea-kaylee.info/. Escriba D279 en la búsqueda para aprender más acerca de \"Tos: Instrucciones de cuidado - [ Cough: Care Instructions ]. \"  Revisado: 9 junio, 2019  Versión del contenido: 12.2  © 2398-6249 Healthwise, Incorporated. Las instrucciones de cuidado fueron adaptadas bajo licencia por Good Help Connections (which disclaims liability or warranty for this information). Si usted tiene [place] afección médica o sobre estas instrucciones, siempre pregunte a reveles profesional de jv. Healthwise, Incorporated niega toda garantía o responsabilidad por reveles uso de esta información. Patient Education        Náuseas y vómito: Instrucciones de cuidado - [ Nausea and Vomiting: Care Instructions ]  Instrucciones de cuidado    Cuando tiene náuseas, podría sentirse débil y sudoroso y notar mucha saliva en reveles boca. Las náuseas suelen [employer]. La mayoría de las veces no hay que preocuparse por las náuseas y [facility] [facility], meera estos pueden ser signos de [place]. Dos causas comunes de náuseas y vómito son la gastroenteritis viral y la intoxicación por alimentos. Las náuseas y el vómito por gastroenteritis viral, por lo general, empiezan a mejorar en unas 24 horas.  Edinson Damon náuseas y el vómito debido a intoxicación por alimentos podrían durar de 12 a 48 horas. El médico lo ha revisado minuciosamente, meera puede desarrollar problemas más tarde. Si nota algún problema o síntomas nuevos, busque tratamiento médico inmediatamente. La atención de seguimiento es tami parte clave de reveles tratamiento y seguridad. Asegúrese de hacer y acudir a todas las citas, y llame a reveles médico si está teniendo problemas. También es tami buena idea saber los resultados de monica exámenes y mantener tami lista de los medicamentos que chang. ¿Cómo puede cuidarse en el hogar? · Para prevenir la deshidratación, mike abundantes líquidos, suficientes para que reveles orina sea de color amarillo mariaelena o noe erik el agua. Opte por beber agua y otros líquidos elle sin cafeína hasta que se sienta mejor. Si tiene tami enfermedad del riñón, del corazón o del hígado y tiene que [place] líquidos, hable con reveles médico antes de aumentar reveles consumo. · Permanezca en la cama hasta que se sienta mejor. · Cuando pueda comer, empiece a consumir sopas claras (caldos), alimentos suaves y líquidos hasta que todos los síntomas hayan desaparecido por un período de 12 a 48 horas. Otras elecciones buenas incluyen pan andrea seco, galletas saladas, cereal cocido y postre de gelatina, erik Jell-O.  ¿Cuándo debe pedir ayuda? Llame al 911 toda vez que piense que puede necesitar atención de emergencia. Por ejemplo, llame si:    · Se desmayó (perdió el conocimiento).    Llame a reveles médico ahora mismo o busque atención médica inmediata si:    · Tiene síntomas de deshidratación, tales erik:  ? Ojos secos y boca seca. ? Orina solo poca cantidad de color oscuro. ?  Tiene más sed de lo normal.     · Tiene dolor abdominal nuevo o que empeora.     · Tiene fiebre nueva o que [place].     · Vomita rosalee o algo parecido a granos de café molido.    Preste especial atención a los cambios en reveles jv y asegúrese de comunicarse con reveles médico si:    · Vivien Bernstein náusea y vómito continuos.     · El vómito está empeorando.     · El vómito dura más de 2 días.     · No mejora erik se esperaba. ¿Dónde puede encontrar más información en inglés? Magi barbosa http://thea-kaylee.info/. Mahnaz Saul H591 en la búsqueda para aprender más acerca de \"Náuseas y vómito: Instrucciones de cuidado - [ Nausea and Vomiting: Care Instructions ]. \"  Revisado: 26 junio, 2019  Versión del contenido: 12.2  © 6118-1435 [employer]. Las instrucciones de cuidado fueron adaptadas bajo licencia por Good Help Connections (which disclaims liability or warranty for this information). Si usted tiene [place] afección médica o sobre estas instrucciones, siempre pregunte a reveles profesional de jv. [employer] niega toda garantía o responsabilidad por reveles uso de esta información.

## 2019-12-31 NOTE — ED PROVIDER NOTES
Ms. Isa Loera is a 41-year-old female who presents to the ER with 2 weeks of nausea and vomiting. She also reports some subjective fevers during that time. She also endorses some mild diffuse of body aches and a mild cough. Cough is nonproductive. Said that she normally has regular periods. She is not had a period and in approximately 6 weeks. This is abnormal for her. Denies any abdominal pain. No runny nose or sore throat. She denies any sick contacts. Denies any chest pain. No changes with her urine or bowel movements. Says she throws up once or twice a day. She denies headaches. She denies any other complaints. No past medical history on file. No past surgical history on file. No family history on file.     Social History     Socioeconomic History    Marital status: SINGLE     Spouse name: Not on file    Number of children: Not on file    Years of education: Not on file    Highest education level: Not on file   Occupational History    Not on file   Social Needs    Financial resource strain: Not on file    Food insecurity:     Worry: Not on file     Inability: Not on file    Transportation needs:     Medical: Not on file     Non-medical: Not on file   Tobacco Use    Smoking status: Never Smoker    Smokeless tobacco: Never Used   Substance and Sexual Activity    Alcohol use: Yes     Frequency: Never    Drug use: No    Sexual activity: Not on file   Lifestyle    Physical activity:     Days per week: Not on file     Minutes per session: Not on file    Stress: Not on file   Relationships    Social connections:     Talks on phone: Not on file     Gets together: Not on file     Attends Muslim service: Not on file     Active member of club or organization: Not on file     Attends meetings of clubs or organizations: Not on file     Relationship status: Not on file    Intimate partner violence:     Fear of current or ex partner: Not on file     Emotionally abused: Not on file     Physically abused: Not on file     Forced sexual activity: Not on file   Other Topics Concern    Not on file   Social History Narrative    ** Merged History Encounter **              ALLERGIES: Patient has no known allergies. Review of Systems   Constitutional: Negative for chills and fever. HENT: Negative for rhinorrhea and sore throat. Respiratory: Negative for cough and shortness of breath. Cardiovascular: Negative for chest pain. Gastrointestinal: Positive for nausea and vomiting. Negative for abdominal pain and diarrhea. Genitourinary: Negative for dysuria and urgency. Musculoskeletal: Negative for arthralgias and back pain. Skin: Negative for rash. Neurological: Negative for dizziness, weakness and light-headedness. Vitals:    12/30/19 1313 12/30/19 2030   BP: 148/74 112/66   Pulse: 100 78   Resp: 16    Temp: 99.1 °F (37.3 °C) 98.4 °F (36.9 °C)   SpO2: 97% 96%   Weight: 71.2 kg (157 lb)    Height: 5' 6\" (1.676 m)             Physical Exam     Vital signs reviewed. Nursing notes reviewed.     Const:  No acute distress, well developed, well nourished  Head:  Atraumatic, normocephalic  Eyes:  PERRL, conjunctiva normal, no scleral icterus  Neck:  Supple, trachea midline  Cardiovascular:  RRR, no murmurs, no gallops, no rubs  Resp:  No resp distress, no increased work of breathing, no wheezes, no rhonchi, no rales, minimal cough during the exam  Abd:  Soft, non-tender, non-distended, no rebound, no guarding, no CVA tenderness  MSK:  No pedal edema, normal ROM  Neuro:  Alert and oriented x3, no cranial nerve defect  Skin:  Warm, dry, intact  Psych: normal mood and affect, behavior is normal, judgement and thought content is normal        MDM  Number of Diagnoses or Management Options  Cough:   Vomiting, intractability of vomiting not specified, presence of nausea not specified, unspecified vomiting type:      Amount and/or Complexity of Data Reviewed  Clinical lab tests: ordered and reviewed  Tests in the radiology section of CPT®: ordered and reviewed  Review and summarize past medical records: yes    Patient Progress  Patient progress: stable          Ms. Bj Andersen is a 59-year-old female who presents to the ER with mild cough and nausea vomiting. She is well-appearing in the ER. She has mildly elevated LFTs. However, her ultrasound and CT of her abdomen were normal.  She does not have a fever in the ER. She has a normal white blood cell count. She is very well-appearing. I will start her on a Z-Monico for possible bronchitis. I also give her a prescription for Zofran as needed for nausea. She is to follow-up with her doctor or return to the ER with any new or worsening symptoms.       Procedures

## 2023-08-22 ENCOUNTER — HOSPITAL ENCOUNTER (EMERGENCY)
Facility: HOSPITAL | Age: 28
Discharge: HOME | End: 2023-08-22
Attending: EMERGENCY MEDICINE | Admitting: EMERGENCY MEDICINE
Payer: MEDICAID

## 2023-08-22 VITALS
HEIGHT: 66 IN | BODY MASS INDEX: 28.12 KG/M2 | OXYGEN SATURATION: 100 % | WEIGHT: 175 LBS | SYSTOLIC BLOOD PRESSURE: 111 MMHG | RESPIRATION RATE: 18 BRPM | DIASTOLIC BLOOD PRESSURE: 71 MMHG | HEART RATE: 91 BPM | TEMPERATURE: 98.1 F

## 2023-08-22 DIAGNOSIS — M79.10 MYALGIA: ICD-10-CM

## 2023-08-22 DIAGNOSIS — R50.9 FEVER, UNSPECIFIED FEVER CAUSE: Primary | ICD-10-CM

## 2023-08-22 LAB
ALBUMIN SERPL-MCNC: 3.8 G/DL (ref 3.5–5)
ALBUMIN/GLOB SERPL: 1.1 (ref 1.1–2.2)
ALP SERPL-CCNC: 93 U/L (ref 45–117)
ALT SERPL-CCNC: 32 U/L (ref 12–78)
ANION GAP SERPL CALC-SCNC: 3 MMOL/L (ref 5–15)
APPEARANCE UR: CLEAR
AST SERPL-CCNC: 21 U/L (ref 15–37)
BACTERIA URNS QL MICRO: ABNORMAL /HPF
BASOPHILS # BLD: 0.1 K/UL (ref 0–0.1)
BASOPHILS NFR BLD: 1 % (ref 0–1)
BILIRUB SERPL-MCNC: 0.3 MG/DL (ref 0.2–1)
BILIRUB UR QL: NEGATIVE
BUN SERPL-MCNC: 9 MG/DL (ref 6–20)
BUN/CREAT SERPL: 13 (ref 12–20)
CALCIUM SERPL-MCNC: 9 MG/DL (ref 8.5–10.1)
CHLORIDE SERPL-SCNC: 109 MMOL/L (ref 97–108)
CO2 SERPL-SCNC: 29 MMOL/L (ref 21–32)
COLOR UR: ABNORMAL
CREAT SERPL-MCNC: 0.72 MG/DL (ref 0.55–1.02)
DIFFERENTIAL METHOD BLD: ABNORMAL
EOSINOPHIL # BLD: 0.1 K/UL (ref 0–0.4)
EOSINOPHIL NFR BLD: 1 % (ref 0–7)
EPITH CASTS URNS QL MICRO: ABNORMAL /LPF
ERYTHROCYTE [DISTWIDTH] IN BLOOD BY AUTOMATED COUNT: 14.6 % (ref 11.5–14.5)
GLOBULIN SER CALC-MCNC: 3.6 G/DL (ref 2–4)
GLUCOSE SERPL-MCNC: 90 MG/DL (ref 65–100)
GLUCOSE UR STRIP.AUTO-MCNC: NEGATIVE MG/DL
HCG UR QL: NEGATIVE
HCT VFR BLD AUTO: 36.8 % (ref 35–47)
HGB BLD-MCNC: 12.1 G/DL (ref 11.5–16)
HGB UR QL STRIP: NEGATIVE
HYALINE CASTS URNS QL MICRO: ABNORMAL /LPF (ref 0–2)
IMM GRANULOCYTES # BLD AUTO: 0 K/UL (ref 0–0.04)
IMM GRANULOCYTES NFR BLD AUTO: 0 % (ref 0–0.5)
KETONES UR QL STRIP.AUTO: NEGATIVE MG/DL
LEUKOCYTE ESTERASE UR QL STRIP.AUTO: NEGATIVE
LYMPHOCYTES # BLD: 1.9 K/UL (ref 0.8–3.5)
LYMPHOCYTES NFR BLD: 32 % (ref 12–49)
MCH RBC QN AUTO: 29.3 PG (ref 26–34)
MCHC RBC AUTO-ENTMCNC: 32.9 G/DL (ref 30–36.5)
MCV RBC AUTO: 89.1 FL (ref 80–99)
MONOCYTES # BLD: 0.3 K/UL (ref 0–1)
MONOCYTES NFR BLD: 5 % (ref 5–13)
NEUTS SEG # BLD: 3.6 K/UL (ref 1.8–8)
NEUTS SEG NFR BLD: 61 % (ref 32–75)
NITRITE UR QL STRIP.AUTO: NEGATIVE
NRBC # BLD: 0 K/UL (ref 0–0.01)
NRBC BLD-RTO: 0 PER 100 WBC
PH UR STRIP: 8 (ref 5–8)
PLATELET # BLD AUTO: 308 K/UL (ref 150–400)
PMV BLD AUTO: 11.4 FL (ref 8.9–12.9)
POTASSIUM SERPL-SCNC: 4.4 MMOL/L (ref 3.5–5.1)
PROT SERPL-MCNC: 7.4 G/DL (ref 6.4–8.2)
PROT UR STRIP-MCNC: NEGATIVE MG/DL
RBC # BLD AUTO: 4.13 M/UL (ref 3.8–5.2)
RBC #/AREA URNS HPF: ABNORMAL /HPF (ref 0–5)
SODIUM SERPL-SCNC: 141 MMOL/L (ref 136–145)
SP GR UR REFRACTOMETRY: 1.02 (ref 1–1.03)
URINE CULTURE IF INDICATED: ABNORMAL
UROBILINOGEN UR QL STRIP.AUTO: 1 EU/DL (ref 0.2–1)
WBC # BLD AUTO: 6 K/UL (ref 3.6–11)
WBC URNS QL MICRO: ABNORMAL /HPF (ref 0–4)

## 2023-08-22 PROCEDURE — 6360000002 HC RX W HCPCS

## 2023-08-22 PROCEDURE — 85025 COMPLETE CBC W/AUTO DIFF WBC: CPT

## 2023-08-22 PROCEDURE — 36415 COLL VENOUS BLD VENIPUNCTURE: CPT

## 2023-08-22 PROCEDURE — 96374 THER/PROPH/DIAG INJ IV PUSH: CPT

## 2023-08-22 PROCEDURE — 81001 URINALYSIS AUTO W/SCOPE: CPT

## 2023-08-22 PROCEDURE — 2580000003 HC RX 258

## 2023-08-22 PROCEDURE — 80053 COMPREHEN METABOLIC PANEL: CPT

## 2023-08-22 PROCEDURE — 81025 URINE PREGNANCY TEST: CPT

## 2023-08-22 PROCEDURE — 99284 EMERGENCY DEPT VISIT MOD MDM: CPT

## 2023-08-22 RX ORDER — KETOROLAC TROMETHAMINE 15 MG/ML
15 INJECTION, SOLUTION INTRAMUSCULAR; INTRAVENOUS ONCE
Status: COMPLETED | OUTPATIENT
Start: 2023-08-22 | End: 2023-08-22

## 2023-08-22 RX ORDER — ACETAMINOPHEN 500 MG
1000 TABLET ORAL
Qty: 60 TABLET | Refills: 0 | Status: SHIPPED | OUTPATIENT
Start: 2023-08-22 | End: 2023-09-21

## 2023-08-22 RX ORDER — IBUPROFEN 800 MG/1
800 TABLET ORAL EVERY 8 HOURS PRN
Qty: 30 TABLET | Refills: 0 | Status: SHIPPED | OUTPATIENT
Start: 2023-08-22 | End: 2023-09-21

## 2023-08-22 RX ORDER — 0.9 % SODIUM CHLORIDE 0.9 %
1000 INTRAVENOUS SOLUTION INTRAVENOUS ONCE
Status: COMPLETED | OUTPATIENT
Start: 2023-08-22 | End: 2023-08-22

## 2023-08-22 RX ADMIN — KETOROLAC TROMETHAMINE 15 MG: 15 INJECTION, SOLUTION INTRAMUSCULAR; INTRAVENOUS at 17:20

## 2023-08-22 RX ADMIN — SODIUM CHLORIDE 1000 ML: 9 INJECTION, SOLUTION INTRAVENOUS at 17:20

## 2023-08-22 ASSESSMENT — ENCOUNTER SYMPTOMS
SHORTNESS OF BREATH: 0
NAUSEA: 0
DIARRHEA: 0
CONSTIPATION: 0
VOMITING: 0

## 2023-08-22 ASSESSMENT — PAIN DESCRIPTION - DESCRIPTORS: DESCRIPTORS: ACHING

## 2023-08-22 ASSESSMENT — PAIN - FUNCTIONAL ASSESSMENT: PAIN_FUNCTIONAL_ASSESSMENT: 0-10

## 2023-08-22 ASSESSMENT — PAIN SCALES - GENERAL: PAINLEVEL_OUTOF10: 8

## 2023-08-22 NOTE — ED PROVIDER NOTES
[facility] EMERGENCY DEPT  EMERGENCY DEPARTMENT ENCOUNTER      Pt Name: Rocael Galan  MRN: [id number]  [address] 1995  Date of evaluation: 8/22/2023  Provider: Mike Garcia PA-C    CHIEF COMPLAINT     No chief complaint on file. CC: fever     HISTORY OF PRESENT ILLNESS   (Location/Symptom, Timing/Onset, Context/Setting, Quality, Duration, Modifying Factors, Severity)  Note limiting factors. A  was used. Rochelle Paz is a 28 y.o. female with no relevant past medical history who presents ambulatory to [facility] ED with cc of weakness and myalgias since Friday. Also notes fevers up to 41C and dysuria. Denies nausea, vomiting, abdominal pain, bowel changes, any other concerns at this time. No medications prior to arrival.      PCP: No primary care provider on file. There are no other complaints, changes or physical findings at this time. Review of External Medical Records:     Nursing Notes were reviewed. REVIEW OF SYSTEMS    (2-9 systems for level 4, 10 or more for level 5)     Review of Systems   Constitutional:  Positive for fever. Negative for chills. HENT:  Negative for congestion. Respiratory:  Negative for shortness of breath. Cardiovascular:  Negative for chest pain. Gastrointestinal:  Negative for constipation, diarrhea, nausea and vomiting. Genitourinary:  Positive for dysuria. Negative for hematuria. Musculoskeletal:  Positive for myalgias. Skin:  Negative for rash. Neurological:  Positive for weakness. Negative for dizziness, light-headedness and headaches. All other systems reviewed and are negative. Except as noted above the remainder of the review of systems was reviewed and negative. PAST MEDICAL HISTORY   No past medical history on file. SURGICAL HISTORY     No past surgical history on file.       CURRENT MEDICATIONS       Discharge Medication List as of 8/22/2023  6:29 PM

## 2023-08-22 NOTE — ED TRIAGE NOTES
Pt reports generalized weakness and body pain since last Friday as well as fevers and painful urination. Pt denies any blood in urine or stool. Pt reports fever got to 41C.  Pt has not taken any fever reducers today

## 2023-08-22 NOTE — ED NOTES
Discharge paperwork reviewed with patient. Patient verbalizes understanding. Patient leaves ER ambulatory and in no acute distress.       Reina Lema RN  08/22/23 [id number]

## 2023-08-22 NOTE — DISCHARGE INSTRUCTIONS
As discussed, your work-up today was negative for any emergent findings and this is likely a viral process. Alternate Motrin and Tylenol for pain or fever. Follow up with your PCP for further management. Return to the ER if you experience severe worsening fever, pain, any other symptoms concerning to you. [facility] SYSTEMS Departments     For adult and child immunizations, family planning, TB screening, STD testing and women's health services. [facility]: [facility] [phone]      [place] [address]   [address] [facility]   [address] [facility]   [address]: Lila Oropeza [facility] [phone]      [address] [place] [address]          [address] [facility]     For primary care services, woman and child wellness, and some clinics providing specialty care. [facility] -- [address] [phone]   [address] [place] [address] [address] [facility] [phone]   [address] [facility] [address] [phone]   49 [facility] [address] [phone]   [address] [facility] [address] [facility] [phone]   [facility] [address] [facility] [phone]   [address] [phone]   [facility]: [address] [place] [address] Nakita Ho[address]da, [address] [phone]     [facility] [address] [phone]   [facility] [address] [phone]   [employer] [facility]  [address] Annabelle Núñez ([email or web address]) [phone]         Sexual Health/Woman Wellness Clinics    For STD/HIV testing and treatment, pregnancy testing and services, men's health, birth control services, LGBT services, and hepatitis/HPV vaccine services. [facility] [employer] [address]  [address]

## 2023-08-24 LAB — HCG UR QL: NEGATIVE

## 2024-04-07 ENCOUNTER — HOSPITAL ENCOUNTER (EMERGENCY)
Facility: HOSPITAL | Age: 29
Discharge: HOME | End: 2024-04-07
Attending: EMERGENCY MEDICINE
Payer: MEDICAID

## 2024-04-07 VITALS
RESPIRATION RATE: 16 BRPM | DIASTOLIC BLOOD PRESSURE: 76 MMHG | SYSTOLIC BLOOD PRESSURE: 123 MMHG | OXYGEN SATURATION: 100 % | HEART RATE: 83 BPM | HEIGHT: 66 IN | WEIGHT: 173.94 LBS | BODY MASS INDEX: 27.95 KG/M2 | TEMPERATURE: 97.7 F

## 2024-04-07 DIAGNOSIS — H11.31 NON-TRAUMATIC SUBCONJUNCTIVAL HEMORRHAGE OF RIGHT EYE: Primary | ICD-10-CM

## 2024-04-07 PROCEDURE — 99283 EMERGENCY DEPT VISIT LOW MDM: CPT

## 2024-04-07 PROCEDURE — 6370000000 HC RX 637 (ALT 250 FOR IP): Performed by: EMERGENCY MEDICINE

## 2024-04-07 RX ORDER — IBUPROFEN 800 MG/1
800 TABLET ORAL
Status: COMPLETED | OUTPATIENT
Start: 2024-04-07 | End: 2024-04-07

## 2024-04-07 RX ORDER — ACETAMINOPHEN 500 MG
1000 TABLET ORAL EVERY 6 HOURS PRN
Qty: 40 TABLET | Refills: 0 | Status: SHIPPED | OUTPATIENT
Start: 2024-04-07

## 2024-04-07 RX ORDER — DIPHENHYDRAMINE HCL 25 MG
2 CAPSULE ORAL EVERY 4 HOURS PRN
Qty: 30 ML | Refills: 0 | Status: SHIPPED | OUTPATIENT
Start: 2024-04-07

## 2024-04-07 RX ORDER — IBUPROFEN 800 MG/1
800 TABLET ORAL EVERY 6 HOURS PRN
Qty: 21 TABLET | Refills: 0 | Status: SHIPPED | OUTPATIENT
Start: 2024-04-07

## 2024-04-07 RX ADMIN — IBUPROFEN 800 MG: 800 TABLET, FILM COATED ORAL at 13:37

## 2024-04-07 ASSESSMENT — PAIN DESCRIPTION - ORIENTATION
ORIENTATION: LEFT
ORIENTATION: RIGHT

## 2024-04-07 ASSESSMENT — PAIN SCALES - GENERAL
PAINLEVEL_OUTOF10: 5
PAINLEVEL_OUTOF10: 8

## 2024-04-07 ASSESSMENT — PAIN DESCRIPTION - LOCATION
LOCATION: EYE;HEAD
LOCATION: EYE

## 2024-04-07 ASSESSMENT — PAIN - FUNCTIONAL ASSESSMENT: PAIN_FUNCTIONAL_ASSESSMENT: 0-10

## 2024-04-07 NOTE — ED TRIAGE NOTES
used in triage, #[id number]    Pt arrives to the ER for complaints of right sided pain to forehead and right eye pain that started 3 days ago.     Pt reports that she also has a red spot to the right eye and states.     Reports taking tylenol.

## 2024-04-08 NOTE — ED PROVIDER NOTES
[facility] EMERGENCY DEPT  EMERGENCY DEPARTMENT ENCOUNTER      Pt Name: Rochelle Giraldo  MRN: [id number]  Birthdate 1995  Date of evaluation: 4/7/2024  Provider: Eddie Bonilla MD    CHIEF COMPLAINT       Chief Complaint   Patient presents with    Eye Problem         HISTORY OF PRESENT ILLNESS    28 y.o. female presents with irritation of right eye now with right sided headaches and feeling like her left eye is twitching. Had a red spot pop up on her eye after symptoms started.             Review of External Medical Records:     Nursing Notes were reviewed.    REVIEW OF SYSTEMS       Review of Systems    Except as noted above the remainder of the review of systems was reviewed and negative.       PAST MEDICAL HISTORY   No past medical history on file.      SURGICAL HISTORY     No past surgical history on file.      CURRENT MEDICATIONS       Discharge Medication List as of 4/7/2024  1:08 PM        CONTINUE these medications which have NOT CHANGED    Details   azithromycin (ZITHROMAX) 250 MG tablet Take two tablets today then one tablet dailyHistorical Med      ondansetron (ZOFRAN-ODT) 4 MG disintegrating tablet Take 4 mg by mouth every 8 hours as neededHistorical Med      promethazine (PHENERGAN) 25 MG tablet Take 25 mg by mouth every 6 hours as neededHistorical Med             ALLERGIES     Patient has no known allergies.    FAMILY HISTORY     No family history on file.       SOCIAL HISTORY       Social History     Socioeconomic History    Marital status:    Tobacco Use    Smoking status: Never    Smokeless tobacco: Never   Substance and Sexual Activity    Alcohol use: Yes    Drug use: No   Social History Narrative    ** Merged History Encounter **                PHYSICAL EXAM       ED Triage Vitals [04/07/24 1258]   BP Temp Temp Source Pulse Respirations SpO2 Height Weight - Scale   123/76 97.7 °F (36.5 °C) Oral 83 16 100 % 1.676 m (5' 6\") 78.9 kg (173 lb 15.1 oz)       Body mass index is

## 2024-04-15 ENCOUNTER — HOSPITAL ENCOUNTER (EMERGENCY)
Facility: HOSPITAL | Age: 29
Discharge: HOME | End: 2024-04-15
Attending: EMERGENCY MEDICINE
Payer: MEDICAID

## 2024-04-15 ENCOUNTER — APPOINTMENT (OUTPATIENT)
Facility: HOSPITAL | Age: 29
End: 2024-04-15
Payer: MEDICAID

## 2024-04-15 VITALS
HEART RATE: 82 BPM | BODY MASS INDEX: 28.45 KG/M2 | TEMPERATURE: 98.1 F | RESPIRATION RATE: 16 BRPM | HEIGHT: 66 IN | OXYGEN SATURATION: 100 % | SYSTOLIC BLOOD PRESSURE: 100 MMHG | WEIGHT: 177.03 LBS | DIASTOLIC BLOOD PRESSURE: 58 MMHG

## 2024-04-15 DIAGNOSIS — Z3A.01 LESS THAN 8 WEEKS GESTATION OF PREGNANCY: Primary | ICD-10-CM

## 2024-04-15 LAB
ALBUMIN SERPL-MCNC: 3.6 G/DL (ref 3.5–5)
ALBUMIN/GLOB SERPL: 0.9 (ref 1.1–2.2)
ALP SERPL-CCNC: 100 U/L (ref 45–117)
ALT SERPL-CCNC: 62 U/L (ref 12–78)
AMORPH CRY URNS QL MICRO: ABNORMAL
ANION GAP SERPL CALC-SCNC: 3 MMOL/L (ref 5–15)
APPEARANCE UR: CLEAR
AST SERPL-CCNC: 26 U/L (ref 15–37)
BACTERIA URNS QL MICRO: NEGATIVE /HPF
BASOPHILS # BLD: 0.1 K/UL (ref 0–0.1)
BASOPHILS NFR BLD: 1 % (ref 0–1)
BILIRUB SERPL-MCNC: 0.3 MG/DL (ref 0.2–1)
BILIRUB UR QL: NEGATIVE
BUN SERPL-MCNC: 13 MG/DL (ref 6–20)
BUN/CREAT SERPL: 13 (ref 12–20)
CALCIUM SERPL-MCNC: 9.3 MG/DL (ref 8.5–10.1)
CHLORIDE SERPL-SCNC: 106 MMOL/L (ref 97–108)
CO2 SERPL-SCNC: 28 MMOL/L (ref 21–32)
COLOR UR: ABNORMAL
CREAT SERPL-MCNC: 0.99 MG/DL (ref 0.55–1.02)
DIFFERENTIAL METHOD BLD: ABNORMAL
EOSINOPHIL # BLD: 0.4 K/UL (ref 0–0.4)
EOSINOPHIL NFR BLD: 4 % (ref 0–7)
EPITH CASTS URNS QL MICRO: ABNORMAL /LPF
ERYTHROCYTE [DISTWIDTH] IN BLOOD BY AUTOMATED COUNT: 16.8 % (ref 11.5–14.5)
GLOBULIN SER CALC-MCNC: 3.9 G/DL (ref 2–4)
GLUCOSE SERPL-MCNC: 103 MG/DL (ref 65–100)
GLUCOSE UR STRIP.AUTO-MCNC: NEGATIVE MG/DL
HCG SERPL-ACNC: 7202 MIU/ML (ref 0–6)
HCG UR QL: POSITIVE
HCT VFR BLD AUTO: 35.7 % (ref 35–47)
HGB BLD-MCNC: 12 G/DL (ref 11.5–16)
HGB UR QL STRIP: NEGATIVE
IMM GRANULOCYTES # BLD AUTO: 0 K/UL (ref 0–0.04)
IMM GRANULOCYTES NFR BLD AUTO: 0 % (ref 0–0.5)
KETONES UR QL STRIP.AUTO: NEGATIVE MG/DL
LEUKOCYTE ESTERASE UR QL STRIP.AUTO: NEGATIVE
LYMPHOCYTES # BLD: 2.8 K/UL (ref 0.8–3.5)
LYMPHOCYTES NFR BLD: 26 % (ref 12–49)
MAGNESIUM SERPL-MCNC: 1.9 MG/DL (ref 1.6–2.4)
MCH RBC QN AUTO: 29.1 PG (ref 26–34)
MCHC RBC AUTO-ENTMCNC: 33.6 G/DL (ref 30–36.5)
MCV RBC AUTO: 86.7 FL (ref 80–99)
MONOCYTES # BLD: 0.6 K/UL (ref 0–1)
MONOCYTES NFR BLD: 5 % (ref 5–13)
NEUTS SEG # BLD: 6.8 K/UL (ref 1.8–8)
NEUTS SEG NFR BLD: 64 % (ref 32–75)
NITRITE UR QL STRIP.AUTO: NEGATIVE
NRBC # BLD: 0 K/UL (ref 0–0.01)
NRBC BLD-RTO: 0 PER 100 WBC
PH UR STRIP: 6 (ref 5–8)
PLATELET # BLD AUTO: 356 K/UL (ref 150–400)
PMV BLD AUTO: 11.1 FL (ref 8.9–12.9)
POTASSIUM SERPL-SCNC: 4.6 MMOL/L (ref 3.5–5.1)
PROT SERPL-MCNC: 7.5 G/DL (ref 6.4–8.2)
PROT UR STRIP-MCNC: NEGATIVE MG/DL
RBC # BLD AUTO: 4.12 M/UL (ref 3.8–5.2)
RBC #/AREA URNS HPF: ABNORMAL /HPF (ref 0–5)
SODIUM SERPL-SCNC: 137 MMOL/L (ref 136–145)
SP GR UR REFRACTOMETRY: 1.02 (ref 1–1.03)
URINE CULTURE IF INDICATED: ABNORMAL
UROBILINOGEN UR QL STRIP.AUTO: 1 EU/DL (ref 0.2–1)
WBC # BLD AUTO: 10.6 K/UL (ref 3.6–11)
WBC URNS QL MICRO: ABNORMAL /HPF (ref 0–4)

## 2024-04-15 PROCEDURE — 85025 COMPLETE CBC W/AUTO DIFF WBC: CPT

## 2024-04-15 PROCEDURE — 84702 CHORIONIC GONADOTROPIN TEST: CPT

## 2024-04-15 PROCEDURE — 83735 ASSAY OF MAGNESIUM: CPT

## 2024-04-15 PROCEDURE — 81001 URINALYSIS AUTO W/SCOPE: CPT

## 2024-04-15 PROCEDURE — 36415 COLL VENOUS BLD VENIPUNCTURE: CPT

## 2024-04-15 PROCEDURE — 81025 URINE PREGNANCY TEST: CPT

## 2024-04-15 PROCEDURE — 99284 EMERGENCY DEPT VISIT MOD MDM: CPT

## 2024-04-15 PROCEDURE — 76817 TRANSVAGINAL US OBSTETRIC: CPT

## 2024-04-15 PROCEDURE — 80053 COMPREHEN METABOLIC PANEL: CPT

## 2024-04-15 ASSESSMENT — PAIN DESCRIPTION - ORIENTATION: ORIENTATION: LEFT;LOWER

## 2024-04-15 ASSESSMENT — ENCOUNTER SYMPTOMS: NAUSEA: 1

## 2024-04-15 ASSESSMENT — PAIN - FUNCTIONAL ASSESSMENT: PAIN_FUNCTIONAL_ASSESSMENT: 0-10

## 2024-04-15 ASSESSMENT — PAIN DESCRIPTION - LOCATION: LOCATION: ABDOMEN;PELVIS

## 2024-04-15 ASSESSMENT — PAIN SCALES - GENERAL: PAINLEVEL_OUTOF10: 8

## 2024-04-16 NOTE — ED PROVIDER NOTES
[facility] EMERGENCY DEPT  EMERGENCY DEPARTMENT ENCOUNTER      Pt Name: Rochelle Giraldo  MRN: [id number]  Birthdate 1995  Date of evaluation: 4/15/2024  Provider: Jamari Santiago PA-C    CHIEF COMPLAINT     No chief complaint on file.        HISTORY OF PRESENT ILLNESS    Patient is an 28 y.o. female, , with reports of lower abdominal pain and 2 positive pregnancy test at home.  Patient reports that the pain started yesterday.  Patient is not having any vaginal bleeding or discharge.  Patient does report some dysuria.  Patient reports last monthly period was .  Patient reports that she does have irregular menses.  Patient does not have an OB at this time. Patient denies chest pain, shortness of breath, vomiting, diarrhea or constipation, headache, dizziness, lightheadedness, fever or chills.  Patient reports occasional alcohol use, denies smoking/vaping or illicit drug use.         Nursing Notes were reviewed.    REVIEW OF SYSTEMS       Review of Systems   Gastrointestinal:  Positive for nausea.   Genitourinary:  Positive for dysuria and pelvic pain.   All other systems reviewed and are negative.        PAST MEDICAL HISTORY   No past medical history on file.      SURGICAL HISTORY     No past surgical history on file.      CURRENT MEDICATIONS       Discharge Medication List as of 4/15/2024 10:57 PM        CONTINUE these medications which have NOT CHANGED    Details   ibuprofen (ADVIL;MOTRIN) 800 MG tablet Take 1 tablet by mouth every 6 hours as needed for Pain or Fever, Disp-21 tablet, R-0Normal      acetaminophen (TYLENOL) 500 MG tablet Take 2 tablets by mouth every 6 hours as needed for Pain or Fever, Disp-40 tablet, R-0Normal      dextran 70-hypromellose (ARTIFICIAL TEARS) 0.1-0.3 % SOLN opthalmic solution Place 2 drops into both eyes every 4 hours as needed (dry eyes), Disp-30 mL, R-0Normal      azithromycin (ZITHROMAX) 250 MG tablet Take two tablets today then one tablet dailyHistorical Med

## 2024-04-16 NOTE — DISCHARGE INSTRUCTIONS
Discussed visit today. Please follow-up with your OB or call the 1 day for you.  Discussed that you need to have a repeat beta-hCG in 48 hours.  Discussed if you develop any worsening of abdominal pain, vaginal bleeding to please return to the emergency room sooner.    Visita discutida hoy. Ranjan un seguimiento con linares obstetra o llame al día 1 por usted. Se discutió que es necesario repetir la beta-hCG en 48 horas. Se recomienda que si presenta algún empeoramiento del dolor abdominal o sangrado vaginal, regrese a la mario de emergencias lo antes posible.

## 2024-04-16 NOTE — ED NOTES
Patient reporting she can not provide a urine sample at this time- patient made aware she needs a urine for multiple tests ordered by the provider.     Encouraged patient to attempt a sample.

## 2024-04-16 NOTE — ED TRIAGE NOTES
Pt presents to the ER w/ pelvic pain that began yesterday and two positive pregnancy tests at home; denies vaginal bleeding.  LMP:  w/ irregular menses

## 2024-04-17 ENCOUNTER — HOSPITAL ENCOUNTER (EMERGENCY)
Facility: HOSPITAL | Age: 29
Discharge: HOME | End: 2024-04-17
Attending: STUDENT IN AN ORGANIZED HEALTH CARE EDUCATION/TRAINING PROGRAM
Payer: MEDICAID

## 2024-04-17 ENCOUNTER — APPOINTMENT (OUTPATIENT)
Facility: HOSPITAL | Age: 29
End: 2024-04-17
Payer: MEDICAID

## 2024-04-17 VITALS
DIASTOLIC BLOOD PRESSURE: 87 MMHG | HEART RATE: 80 BPM | HEIGHT: 66 IN | SYSTOLIC BLOOD PRESSURE: 118 MMHG | RESPIRATION RATE: 12 BRPM | WEIGHT: 177.03 LBS | TEMPERATURE: 98.1 F | BODY MASS INDEX: 28.45 KG/M2 | OXYGEN SATURATION: 98 %

## 2024-04-17 DIAGNOSIS — O26.891 ABDOMINAL PAIN DURING PREGNANCY, FIRST TRIMESTER: ICD-10-CM

## 2024-04-17 DIAGNOSIS — O20.0 THREATENED MISCARRIAGE IN EARLY PREGNANCY: Primary | ICD-10-CM

## 2024-04-17 DIAGNOSIS — O21.9 NAUSEA AND VOMITING DURING PREGNANCY: ICD-10-CM

## 2024-04-17 DIAGNOSIS — R10.9 ABDOMINAL PAIN DURING PREGNANCY, FIRST TRIMESTER: ICD-10-CM

## 2024-04-17 LAB
ANION GAP SERPL CALC-SCNC: 3 MMOL/L (ref 5–15)
BASOPHILS # BLD: 0.1 K/UL (ref 0–0.1)
BASOPHILS NFR BLD: 1 % (ref 0–1)
BUN SERPL-MCNC: 12 MG/DL (ref 6–20)
BUN/CREAT SERPL: 17 (ref 12–20)
CALCIUM SERPL-MCNC: 9.4 MG/DL (ref 8.5–10.1)
CHLORIDE SERPL-SCNC: 104 MMOL/L (ref 97–108)
CO2 SERPL-SCNC: 27 MMOL/L (ref 21–32)
CREAT SERPL-MCNC: 0.72 MG/DL (ref 0.55–1.02)
DIFFERENTIAL METHOD BLD: ABNORMAL
EOSINOPHIL # BLD: 0.3 K/UL (ref 0–0.4)
EOSINOPHIL NFR BLD: 3 % (ref 0–7)
ERYTHROCYTE [DISTWIDTH] IN BLOOD BY AUTOMATED COUNT: 16.3 % (ref 11.5–14.5)
GLUCOSE SERPL-MCNC: 127 MG/DL (ref 65–100)
HCG SERPL-ACNC: ABNORMAL MIU/ML (ref 0–6)
HCT VFR BLD AUTO: 37.4 % (ref 35–47)
HGB BLD-MCNC: 12.6 G/DL (ref 11.5–16)
IMM GRANULOCYTES # BLD AUTO: 0 K/UL (ref 0–0.04)
IMM GRANULOCYTES NFR BLD AUTO: 0 % (ref 0–0.5)
LYMPHOCYTES # BLD: 2.3 K/UL (ref 0.8–3.5)
LYMPHOCYTES NFR BLD: 19 % (ref 12–49)
MCH RBC QN AUTO: 29.2 PG (ref 26–34)
MCHC RBC AUTO-ENTMCNC: 33.7 G/DL (ref 30–36.5)
MCV RBC AUTO: 86.8 FL (ref 80–99)
MONOCYTES # BLD: 0.6 K/UL (ref 0–1)
MONOCYTES NFR BLD: 5 % (ref 5–13)
NEUTS SEG # BLD: 9.1 K/UL (ref 1.8–8)
NEUTS SEG NFR BLD: 72 % (ref 32–75)
NRBC # BLD: 0 K/UL (ref 0–0.01)
NRBC BLD-RTO: 0 PER 100 WBC
PLATELET # BLD AUTO: 330 K/UL (ref 150–400)
PMV BLD AUTO: 10.6 FL (ref 8.9–12.9)
POTASSIUM SERPL-SCNC: 3.9 MMOL/L (ref 3.5–5.1)
RBC # BLD AUTO: 4.31 M/UL (ref 3.8–5.2)
SODIUM SERPL-SCNC: 134 MMOL/L (ref 136–145)
WBC # BLD AUTO: 12.3 K/UL (ref 3.6–11)

## 2024-04-17 PROCEDURE — 36415 COLL VENOUS BLD VENIPUNCTURE: CPT

## 2024-04-17 PROCEDURE — 99284 EMERGENCY DEPT VISIT MOD MDM: CPT

## 2024-04-17 PROCEDURE — 76801 OB US < 14 WKS SINGLE FETUS: CPT

## 2024-04-17 PROCEDURE — 85025 COMPLETE CBC W/AUTO DIFF WBC: CPT

## 2024-04-17 PROCEDURE — 84702 CHORIONIC GONADOTROPIN TEST: CPT

## 2024-04-17 PROCEDURE — 6360000002 HC RX W HCPCS: Performed by: STUDENT IN AN ORGANIZED HEALTH CARE EDUCATION/TRAINING PROGRAM

## 2024-04-17 PROCEDURE — 96374 THER/PROPH/DIAG INJ IV PUSH: CPT

## 2024-04-17 PROCEDURE — 2580000003 HC RX 258: Performed by: STUDENT IN AN ORGANIZED HEALTH CARE EDUCATION/TRAINING PROGRAM

## 2024-04-17 PROCEDURE — 80048 BASIC METABOLIC PNL TOTAL CA: CPT

## 2024-04-17 RX ORDER — ONDANSETRON 4 MG/1
4 TABLET, ORALLY DISINTEGRATING ORAL 3 TIMES DAILY PRN
Qty: 21 TABLET | Refills: 0 | Status: SHIPPED | OUTPATIENT
Start: 2024-04-17

## 2024-04-17 RX ORDER — 0.9 % SODIUM CHLORIDE 0.9 %
1000 INTRAVENOUS SOLUTION INTRAVENOUS ONCE
Status: COMPLETED | OUTPATIENT
Start: 2024-04-17 | End: 2024-04-17

## 2024-04-17 RX ORDER — ONDANSETRON 2 MG/ML
4 INJECTION INTRAMUSCULAR; INTRAVENOUS ONCE
Status: COMPLETED | OUTPATIENT
Start: 2024-04-17 | End: 2024-04-17

## 2024-04-17 RX ADMIN — ONDANSETRON 4 MG: 2 INJECTION INTRAMUSCULAR; INTRAVENOUS at 20:08

## 2024-04-17 RX ADMIN — SODIUM CHLORIDE 1000 ML: 9 INJECTION, SOLUTION INTRAVENOUS at 20:07

## 2024-04-17 ASSESSMENT — PAIN DESCRIPTION - LOCATION: LOCATION: ABDOMEN

## 2024-04-17 ASSESSMENT — PAIN - FUNCTIONAL ASSESSMENT
PAIN_FUNCTIONAL_ASSESSMENT: 0-10
PAIN_FUNCTIONAL_ASSESSMENT: NONE - DENIES PAIN

## 2024-04-17 ASSESSMENT — ENCOUNTER SYMPTOMS
SORE THROAT: 0
VOMITING: 1
ABDOMINAL PAIN: 1
BACK PAIN: 0
RHINORRHEA: 0
EYE DISCHARGE: 0
WHEEZING: 0
COUGH: 0
DIARRHEA: 0
COLOR CHANGE: 0
SHORTNESS OF BREATH: 0
NAUSEA: 1
SINUS PAIN: 0

## 2024-04-17 ASSESSMENT — PAIN SCALES - GENERAL: PAINLEVEL_OUTOF10: 8

## 2024-04-17 ASSESSMENT — PAIN DESCRIPTION - DESCRIPTORS: DESCRIPTORS: CRAMPING

## 2024-04-17 ASSESSMENT — PAIN DESCRIPTION - ORIENTATION: ORIENTATION: RIGHT;LEFT;LOWER

## 2024-04-17 NOTE — ED TRIAGE NOTES
Pt reports she came to ED Monday for lower abd pain and was advised to come back today for a follow up. Pt reports vomiting and bilateral lower abd pain but denies vaginal bleeding or abnormal discharge. Pt reports this is pt's 3rd pregnancy with hx miscarriage. Pt reports LMP 2/24/24. Provider in triage

## 2024-04-17 NOTE — ED PROVIDER NOTES
[facility] EMERGENCY DEPT  EMERGENCY DEPARTMENT ENCOUNTER      Pt Name: Rochelle Giraldo  MRN: [id number]  Birthdate 1995  Date of evaluation: 2024  Provider: LUZ MARINA Reina    CHIEF COMPLAINT       Chief Complaint   Patient presents with    Abdominal Pain    Emesis         HISTORY OF PRESENT ILLNESS    Patient is a 28-year-old female with history of G, LMP 24 who presents to ED for repeat beta-hCG. She was seen in the ED 2 days ago and had an quantitative b-hCG of 7202. Ultrasound showed intrauterine gestational sac with an estimated gestational age of 5.9 weeks is noted. No fetal tissue or yolk sac is identified.  She was having pelvic pain at that time.  Reports that she continues to have slight pelvic pain as well as nausea and vomiting.  States she is unable to tolerate p.o.  Denies any vaginal bleeding, dysuria, urinary frequency, flank pain.  No medications prior to arrival.  Reports she does not have an OB/GYN.            Review of External Medical Records:     Nursing Notes were reviewed.    REVIEW OF SYSTEMS       Review of Systems   Constitutional:  Negative for chills and fever.   HENT:  Negative for congestion, ear discharge, ear pain, rhinorrhea, sinus pain, sore throat and tinnitus.    Eyes:  Negative for discharge.   Respiratory:  Negative for cough, shortness of breath and wheezing.    Cardiovascular:  Negative for chest pain and palpitations.   Gastrointestinal:  Positive for abdominal pain, nausea and vomiting. Negative for diarrhea.   Genitourinary:  Positive for pelvic pain. Negative for dysuria, hematuria, vaginal bleeding and vaginal discharge.   Musculoskeletal:  Negative for arthralgias, back pain, neck pain and neck stiffness.   Skin:  Negative for color change and wound.   Neurological:  Negative for syncope, weakness, numbness and headaches.   All other systems reviewed and are negative.      Except as noted above the remainder of the review of systems

## 2024-04-18 NOTE — DISCHARGE INSTRUCTIONS
Takes Zofran as prescribed for nausea.  Take a prenatal vitamin daily.  Please schedule an appointment to be seen with an OB/GYN to establish care for this pregnancy.  If you develop worsening pain or vaginal bleeding return to the ER or contact your OB/GYN.

## 2024-04-18 NOTE — ED NOTES
I have reviewed discharge instructions with the patient.  Opportunity for questions and clarification was provided.  The patient verbalized understanding.  Patient discharged out of the ED via ambulation with no difficulty and in stable condition.

## 2024-06-01 ENCOUNTER — HOSPITAL ENCOUNTER (EMERGENCY)
Facility: HOSPITAL | Age: 29
Discharge: HOME | End: 2024-06-02
Attending: EMERGENCY MEDICINE
Payer: MEDICAID

## 2024-06-01 DIAGNOSIS — R11.2 NAUSEA AND VOMITING, UNSPECIFIED VOMITING TYPE: ICD-10-CM

## 2024-06-01 DIAGNOSIS — B34.9 VIRAL SYNDROME: Primary | ICD-10-CM

## 2024-06-01 PROCEDURE — 99283 EMERGENCY DEPT VISIT LOW MDM: CPT

## 2024-06-02 VITALS
SYSTOLIC BLOOD PRESSURE: 97 MMHG | OXYGEN SATURATION: 97 % | TEMPERATURE: 97.8 F | HEART RATE: 88 BPM | RESPIRATION RATE: 14 BRPM | WEIGHT: 184.53 LBS | DIASTOLIC BLOOD PRESSURE: 62 MMHG | BODY MASS INDEX: 29.66 KG/M2 | HEIGHT: 66 IN

## 2024-06-02 LAB
FLUAV RNA SPEC QL NAA+PROBE: NOT DETECTED
FLUBV RNA SPEC QL NAA+PROBE: NOT DETECTED
SARS-COV-2 RNA RESP QL NAA+PROBE: NOT DETECTED

## 2024-06-02 PROCEDURE — 6370000000 HC RX 637 (ALT 250 FOR IP): Performed by: EMERGENCY MEDICINE

## 2024-06-02 PROCEDURE — 87636 SARSCOV2 & INF A&B AMP PRB: CPT

## 2024-06-02 RX ORDER — ACETAMINOPHEN 325 MG/1
650 TABLET ORAL
Status: COMPLETED | OUTPATIENT
Start: 2024-06-02 | End: 2024-06-02

## 2024-06-02 RX ORDER — ONDANSETRON 4 MG/1
4 TABLET, ORALLY DISINTEGRATING ORAL ONCE
Status: COMPLETED | OUTPATIENT
Start: 2024-06-02 | End: 2024-06-02

## 2024-06-02 RX ORDER — ACETAMINOPHEN 500 MG
1000 TABLET ORAL EVERY 6 HOURS PRN
Qty: 40 TABLET | Refills: 0 | Status: SHIPPED | OUTPATIENT
Start: 2024-06-02

## 2024-06-02 RX ORDER — ONDANSETRON 4 MG/1
4 TABLET, ORALLY DISINTEGRATING ORAL 3 TIMES DAILY PRN
Qty: 21 TABLET | Refills: 0 | Status: SHIPPED | OUTPATIENT
Start: 2024-06-02

## 2024-06-02 RX ADMIN — ACETAMINOPHEN 650 MG: 325 TABLET ORAL at 00:10

## 2024-06-02 RX ADMIN — ONDANSETRON 4 MG: 4 TABLET, ORALLY DISINTEGRATING ORAL at 00:09

## 2024-06-02 ASSESSMENT — PAIN - FUNCTIONAL ASSESSMENT: PAIN_FUNCTIONAL_ASSESSMENT: 0-10

## 2024-06-02 ASSESSMENT — PAIN DESCRIPTION - LOCATION
LOCATION: HEAD
LOCATION: HEAD

## 2024-06-02 ASSESSMENT — PAIN SCALES - GENERAL
PAINLEVEL_OUTOF10: 2
PAINLEVEL_OUTOF10: 8

## 2024-06-02 NOTE — ED PROVIDER NOTES
Take 25 mg by mouth every 6 hours as needed             ALLERGIES     Patient has no known allergies.    FAMILY HISTORY     No family history on file.       SOCIAL HISTORY       Social History     Socioeconomic History    Marital status:    Tobacco Use    Smoking status: Never    Smokeless tobacco: Never   Substance and Sexual Activity    Alcohol use: Yes    Drug use: No   Social History Narrative    ** Merged History Encounter **                PHYSICAL EXAM    (up to 7 for level 4, 8 or more for level 5)     ED Triage Vitals [06/02/24 0000]   BP Temp Temp Source Pulse Respirations SpO2 Height Weight - Scale   (!) 114/91 97.8 °F (36.6 °C) Oral 87 18 99 % 1.676 m (5' 6\") 83.7 kg (184 lb 8.4 oz)       Body mass index is 29.78 kg/m².    Physical Exam  Vitals and nursing note reviewed. Exam conducted with a chaperone present.         CONSTITUTIONAL: Well-appearing; well-nourished; in no apparent distress  HEAD: Normocephalic; atraumatic  EYES: PERRL; EOM intact; conjunctiva and sclera are clear bilaterally.  ENT: No rhinorrhea; normal pharynx with no tonsillar hypertrophy; mucous membranes pink/moist, no erythema, no exudate.  NECK: Supple; non-tender; no cervical lymphadenopathy  CARD: Normal S1, S2; no murmurs, rubs, or gallops. Regular rate and rhythm.  RESP: Normal respiratory effort; breath sounds clear and equal bilaterally; no wheezes, rhonchi, or rales.  ABD: Normal bowel sounds; non-distended; non-tender; no palpable organomegaly, no masses, no bruits.  Back Exam: Normal inspection; no vertebral point tenderness, no CVA tenderness. Normal range of motion.  EXT: Normal ROM in all four extremities; non-tender to palpation; no swelling or deformity; distal pulses are normal, no edema.  SKIN: Warm; dry; no rash.  NEURO:Alert and oriented x 3, coherent, SINA-XII grossly intact, sensory and motor are non-focal.        DIAGNOSTIC RESULTS     EKG: All EKG's are interpreted by the Emergency Department Physician

## 2024-06-02 NOTE — ED TRIAGE NOTES
Pt ambulatory to triage c/o fever, headache and chills since yesterday. No meds taken for fever bc pt is 12 weeks pregnant.    Session code: [id number]  : [id number]  Hungarian

## 2024-06-02 NOTE — ED NOTES
Discharge instruction given to patient and verbalize understanding. No complain of pain. Patient is ambulatory.

## 2024-06-11 ENCOUNTER — HOSPITAL ENCOUNTER (EMERGENCY)
Facility: HOSPITAL | Age: 29
Discharge: HOME | End: 2024-06-11
Attending: STUDENT IN AN ORGANIZED HEALTH CARE EDUCATION/TRAINING PROGRAM
Payer: MEDICAID

## 2024-06-11 VITALS
SYSTOLIC BLOOD PRESSURE: 136 MMHG | TEMPERATURE: 98.1 F | BODY MASS INDEX: 29.62 KG/M2 | HEART RATE: 86 BPM | DIASTOLIC BLOOD PRESSURE: 85 MMHG | HEIGHT: 66 IN | WEIGHT: 184.3 LBS | RESPIRATION RATE: 18 BRPM | OXYGEN SATURATION: 100 %

## 2024-06-11 DIAGNOSIS — J06.9 VIRAL URI: Primary | ICD-10-CM

## 2024-06-11 PROCEDURE — 99283 EMERGENCY DEPT VISIT LOW MDM: CPT

## 2024-06-11 RX ORDER — OXYMETAZOLINE HYDROCHLORIDE 0.05 G/100ML
2 SPRAY NASAL 2 TIMES DAILY
Qty: 15 ML | Refills: 0 | Status: SHIPPED | OUTPATIENT
Start: 2024-06-11 | End: 2024-07-11

## 2024-06-11 RX ORDER — ACETAMINOPHEN 500 MG
1000 TABLET ORAL EVERY 6 HOURS PRN
Qty: 120 TABLET | Refills: 0 | Status: SHIPPED | OUTPATIENT
Start: 2024-06-11

## 2024-06-11 RX ORDER — CETIRIZINE HYDROCHLORIDE 10 MG/1
10 TABLET ORAL DAILY
Qty: 30 TABLET | Refills: 0 | Status: SHIPPED | OUTPATIENT
Start: 2024-06-11

## 2024-06-11 ASSESSMENT — PAIN SCALES - GENERAL: PAINLEVEL_OUTOF10: 0

## 2024-06-11 ASSESSMENT — PAIN - FUNCTIONAL ASSESSMENT: PAIN_FUNCTIONAL_ASSESSMENT: 0-10

## 2024-06-12 NOTE — ED PROVIDER NOTES
[facility] EMERGENCY DEPT  EMERGENCY DEPARTMENT ENCOUNTER      Pt Name: Rochelle Giraldo  MRN: [id number]  Birthdate 1995  Date of evaluation: 6/11/2024  Provider: LUZ MARINA Reina    CHIEF COMPLAINT       Chief Complaint   Patient presents with    Fever    Nasal Congestion         HISTORY OF PRESENT ILLNESS    Patient is a 28-year-old female who presents to ED complaining of nasal congestion and cough that started 8 days ago.  Patient reports she is having intermittent fever.  She was seen last week for similar symptoms and told that it was likely a virus.  States she is worried because she is 13 weeks pregnant and does not know if she should be taking ibuprofen.  She denies any chest pain, palpitations, abdominal pain, vaginal bleeding or discharge.     #[id number] was used throughout encounter.            Review of External Medical Records:     Nursing Notes were reviewed.    REVIEW OF SYSTEMS       Review of Systems   Constitutional:  Positive for fever. Negative for chills.   HENT:  Positive for congestion. Negative for ear discharge, ear pain, rhinorrhea, sinus pain, sore throat and tinnitus.    Eyes:  Negative for discharge.   Respiratory:  Positive for cough. Negative for shortness of breath and wheezing.    Cardiovascular:  Negative for chest pain and palpitations.   Gastrointestinal:  Negative for abdominal pain, diarrhea, nausea and vomiting.   Genitourinary:  Negative for dysuria, hematuria, pelvic pain, vaginal bleeding and vaginal discharge.   Musculoskeletal:  Negative for arthralgias, back pain, neck pain and neck stiffness.   Skin:  Negative for color change and wound.   Neurological:  Negative for syncope, weakness, numbness and headaches.   All other systems reviewed and are negative.      Except as noted above the remainder of the review of systems was reviewed and negative.       PAST MEDICAL HISTORY   No past medical history on file.      SURGICAL HISTORY    Intubation    Date/Time: 5/1/2024 12:08 PM    Performed by: Tricia Candelario CRNA  Authorized by: Keshia Mclaughlin MD    Intubation:     Induction:  Intravenous    Intubated:  Postinduction    Mask Ventilation:  Easy mask    Attempts:  1    Attempted By:  CRNA    Difficult Airway Encountered?: No      Complications:  None    Airway Device:  Supraglottic airway/LMA    Airway Device Size:  4.0    Placement Verified By:  Capnometry    Complicating Factors:  None    Findings Post-Intubation:  Atraumatic/condition of teeth unchanged and BS equal bilateral

## 2024-06-12 NOTE — DISCHARGE INSTRUCTIONS
Take Tylenol (acetaminophen) 1000mg every 6 hours as needed for fever/pain. Take zyrtec daily. Use afrin to help with nasal congestion/rhinitis. Use budesonide nasal spray. These symptoms should improve over the next few days if you still develop fever, chills, worsening cough, shortness of breath or other new concerning symptoms return to the ER.    [place] Tylenol (acetaminofén) 1000 mg cada 6 horas según sea necesario para la fiebre o el dolor. [place] zyrtec diariamente. Utilice afrin para ayudar con la congestión nasal/rinitis. Utilice budesonida en aerosol nasal. Estos síntomas deberían mejorar en los próximos días si aún presenta fiebre, escalofríos, tos que empeora, dificultad para respirar u otros síntomas nuevos y preocupantes que regresan a la mario de emergencias.

## 2024-06-12 NOTE — ED TRIAGE NOTES
Session: [id number]  : [id number]  [place]    Pt ambulatory to triage c/o 8 days ago she went to ED with a cough and cold. She is still having a fever and not feeling well. Worried bc she is 13 weeks pregnant and doesn't know if she should keep taking ibuprofen.

## 2024-06-14 ASSESSMENT — ENCOUNTER SYMPTOMS
NAUSEA: 0
BACK PAIN: 0
ABDOMINAL PAIN: 0
COLOR CHANGE: 0
SHORTNESS OF BREATH: 0
EYE DISCHARGE: 0
RHINORRHEA: 0
SINUS PAIN: 0
DIARRHEA: 0
COUGH: 1
WHEEZING: 0
SORE THROAT: 0
VOMITING: 0

## 2024-07-12 ENCOUNTER — HOSPITAL ENCOUNTER (EMERGENCY)
Facility: HOSPITAL | Age: 29
Discharge: HOME | End: 2024-07-13
Attending: EMERGENCY MEDICINE

## 2024-07-12 VITALS
OXYGEN SATURATION: 100 % | HEART RATE: 80 BPM | WEIGHT: 184 LBS | RESPIRATION RATE: 18 BRPM | SYSTOLIC BLOOD PRESSURE: 109 MMHG | BODY MASS INDEX: 31.41 KG/M2 | HEIGHT: 64 IN | DIASTOLIC BLOOD PRESSURE: 77 MMHG | TEMPERATURE: 97.5 F

## 2024-07-12 DIAGNOSIS — T16.2XXA FOREIGN BODY OF LEFT EAR, INITIAL ENCOUNTER: Primary | ICD-10-CM

## 2024-07-12 PROCEDURE — 99283 EMERGENCY DEPT VISIT LOW MDM: CPT

## 2024-07-12 ASSESSMENT — PAIN DESCRIPTION - LOCATION: LOCATION: EAR

## 2024-07-12 ASSESSMENT — PAIN SCALES - GENERAL: PAINLEVEL_OUTOF10: 7

## 2024-07-12 ASSESSMENT — PAIN - FUNCTIONAL ASSESSMENT: PAIN_FUNCTIONAL_ASSESSMENT: 0-10

## 2024-07-12 ASSESSMENT — PAIN DESCRIPTION - ORIENTATION: ORIENTATION: LEFT

## 2024-07-13 PROCEDURE — 6370000000 HC RX 637 (ALT 250 FOR IP): Performed by: EMERGENCY MEDICINE

## 2024-07-13 RX ADMIN — Medication 5 DROP: at 00:51

## 2024-07-13 ASSESSMENT — PAIN SCALES - GENERAL: PAINLEVEL_OUTOF10: 0

## 2024-07-13 ASSESSMENT — PAIN - FUNCTIONAL ASSESSMENT: PAIN_FUNCTIONAL_ASSESSMENT: 0-10

## 2024-07-13 NOTE — ED TRIAGE NOTES
CC feels like she has water in her ears and reports intense itchiness in her ears. Pt reports her itchiness,   Pt was here for ear pain and was given nasal drops but did not alleviate ear pain. Pt reports she got a qtip to her ear on left she reports it looked like part of the qtip got stuck in her ear. Pt reports pressure in her left ear.   Pt reports she is pregnant, 20 weeks.

## 2024-07-13 NOTE — ED PROVIDER NOTES
should their condition change.    (Please note that portions of this note were completed with a voice recognition program.  Efforts were made to edit the dictations but occasionally words are mis-transcribed.)    Radha Petty MD (electronically signed)  Emergency Attending Physician       Radha Petty MD  07/13/24 [id number]

## 2024-09-08 ENCOUNTER — HOSPITAL ENCOUNTER (OUTPATIENT)
Facility: HOSPITAL | Age: 29
Discharge: HOME | End: 2024-09-08
Attending: SPECIALIST | Admitting: SPECIALIST

## 2024-09-08 VITALS
OXYGEN SATURATION: 98 % | RESPIRATION RATE: 16 BRPM | DIASTOLIC BLOOD PRESSURE: 77 MMHG | HEART RATE: 67 BPM | SYSTOLIC BLOOD PRESSURE: 120 MMHG | TEMPERATURE: 98.1 F

## 2024-09-08 LAB
APPEARANCE UR: CLEAR
BACTERIA URNS QL MICRO: NEGATIVE /HPF
BILIRUB UR QL: NEGATIVE
COLOR UR: ABNORMAL
EPITH CASTS URNS QL MICRO: ABNORMAL /LPF
FIBRONECTIN FETAL VAG QL: POSITIVE
GLUCOSE UR STRIP.AUTO-MCNC: NEGATIVE MG/DL
HGB UR QL STRIP: NEGATIVE
HYALINE CASTS URNS QL MICRO: ABNORMAL /LPF (ref 0–2)
KETONES UR QL STRIP.AUTO: 15 MG/DL
LEUKOCYTE ESTERASE UR QL STRIP.AUTO: NEGATIVE
NITRITE UR QL STRIP.AUTO: NEGATIVE
PH UR STRIP: 6 (ref 5–8)
PROT UR STRIP-MCNC: NEGATIVE MG/DL
RBC #/AREA URNS HPF: ABNORMAL /HPF (ref 0–5)
SP GR UR REFRACTOMETRY: 1.02 (ref 1–1.03)
URINE CULTURE IF INDICATED: ABNORMAL
UROBILINOGEN UR QL STRIP.AUTO: 1 EU/DL (ref 0.2–1)
WBC URNS QL MICRO: ABNORMAL /HPF (ref 0–4)

## 2024-09-08 PROCEDURE — G0378 HOSPITAL OBSERVATION PER HR: HCPCS

## 2024-09-08 PROCEDURE — 81001 URINALYSIS AUTO W/SCOPE: CPT

## 2024-09-08 PROCEDURE — G0379 DIRECT REFER HOSPITAL OBSERV: HCPCS

## 2024-09-08 PROCEDURE — 82731 ASSAY OF FETAL FIBRONECTIN: CPT

## 2024-09-12 ENCOUNTER — HOSPITAL ENCOUNTER (OUTPATIENT)
Facility: HOSPITAL | Age: 29
Discharge: HOME | End: 2024-09-12
Attending: SPECIALIST | Admitting: OBSTETRICS & GYNECOLOGY

## 2024-09-12 VITALS
OXYGEN SATURATION: 99 % | TEMPERATURE: 98.9 F | HEART RATE: 74 BPM | DIASTOLIC BLOOD PRESSURE: 68 MMHG | SYSTOLIC BLOOD PRESSURE: 120 MMHG | RESPIRATION RATE: 16 BRPM

## 2024-09-12 PROBLEM — R10.2 PELVIC PRESSURE IN PREGNANCY: Status: ACTIVE | Noted: 2024-09-12

## 2024-09-12 PROBLEM — O26.899 PELVIC PRESSURE IN PREGNANCY: Status: ACTIVE | Noted: 2024-09-12

## 2024-09-12 LAB
APPEARANCE UR: CLEAR
BACTERIA URNS QL MICRO: NEGATIVE /HPF
BILIRUB UR QL: NEGATIVE
COLOR UR: ABNORMAL
EPITH CASTS URNS QL MICRO: ABNORMAL /LPF
GLUCOSE UR STRIP.AUTO-MCNC: NEGATIVE MG/DL
HGB UR QL STRIP: NEGATIVE
HYALINE CASTS URNS QL MICRO: ABNORMAL /LPF (ref 0–2)
KETONES UR QL STRIP.AUTO: NEGATIVE MG/DL
LEUKOCYTE ESTERASE UR QL STRIP.AUTO: NEGATIVE
NITRITE UR QL STRIP.AUTO: NEGATIVE
PH UR STRIP: 6.5 (ref 5–8)
PROT UR STRIP-MCNC: NEGATIVE MG/DL
RBC #/AREA URNS HPF: ABNORMAL /HPF (ref 0–5)
SP GR UR REFRACTOMETRY: <1.005 (ref 1–1.03)
URINE CULTURE IF INDICATED: ABNORMAL
UROBILINOGEN UR QL STRIP.AUTO: 0.2 EU/DL (ref 0.2–1)
WBC URNS QL MICRO: ABNORMAL /HPF (ref 0–4)

## 2024-09-12 PROCEDURE — 99202 OFFICE O/P NEW SF 15 MIN: CPT

## 2024-09-12 PROCEDURE — 81001 URINALYSIS AUTO W/SCOPE: CPT

## 2024-09-12 PROCEDURE — G0379 DIRECT REFER HOSPITAL OBSERV: HCPCS

## 2024-09-12 PROCEDURE — G0378 HOSPITAL OBSERVATION PER HR: HCPCS

## 2024-09-12 RX ORDER — ONDANSETRON 4 MG/1
4 TABLET, ORALLY DISINTEGRATING ORAL EVERY 8 HOURS PRN
Qty: 30 TABLET | Refills: 0 | Status: SHIPPED | OUTPATIENT
Start: 2024-09-12

## 2024-09-12 RX ORDER — ONDANSETRON 4 MG/1
4 TABLET, ORALLY DISINTEGRATING ORAL 3 TIMES DAILY PRN
Qty: 30 TABLET | Refills: 0 | Status: SHIPPED | OUTPATIENT
Start: 2024-09-12

## 2024-09-12 ASSESSMENT — ENCOUNTER SYMPTOMS
BACK PAIN: 0
TROUBLE SWALLOWING: 0
DIARRHEA: 0
SHORTNESS OF BREATH: 0
VOMITING: 1
CONSTIPATION: 0
RHINORRHEA: 0
NAUSEA: 1
EYE PAIN: 0
CHEST TIGHTNESS: 0

## 2024-09-16 ENCOUNTER — OFFICE VISIT (OUTPATIENT)
Age: 29
End: 2024-09-16

## 2024-09-16 ENCOUNTER — TELEPHONE (OUTPATIENT)
Age: 29
End: 2024-09-16

## 2024-09-16 ENCOUNTER — INITIAL PRENATAL (OUTPATIENT)
Age: 29
End: 2024-09-16

## 2024-09-16 VITALS
TEMPERATURE: 98.4 F | HEIGHT: 64 IN | WEIGHT: 201 LBS | DIASTOLIC BLOOD PRESSURE: 70 MMHG | OXYGEN SATURATION: 98 % | SYSTOLIC BLOOD PRESSURE: 120 MMHG | BODY MASS INDEX: 34.31 KG/M2 | HEART RATE: 84 BPM | RESPIRATION RATE: 18 BRPM

## 2024-09-16 DIAGNOSIS — Z34.92 SUPERVISION OF LOW-RISK PREGNANCY, SECOND TRIMESTER: Primary | ICD-10-CM

## 2024-09-16 DIAGNOSIS — Z78.9 NEED FOR FOLLOW-UP BY SOCIAL WORKER: ICD-10-CM

## 2024-09-16 DIAGNOSIS — Z13.9 ENCOUNTER FOR SCREENING INVOLVING SOCIAL DETERMINANTS OF HEALTH (SDOH): Primary | ICD-10-CM

## 2024-09-16 DIAGNOSIS — Z59.89 UNINSURED: ICD-10-CM

## 2024-09-16 PROCEDURE — 0500F INITIAL PRENATAL CARE VISIT: CPT

## 2024-09-16 PROCEDURE — 90715 TDAP VACCINE 7 YRS/> IM: CPT

## 2024-09-16 RX ORDER — FOLIC ACID 1 MG/1
1 TABLET ORAL DAILY
COMMUNITY

## 2024-09-16 RX ORDER — ASPIRIN 81 MG/1
81 TABLET ORAL DAILY
Qty: 90 TABLET | Refills: 0 | Status: SHIPPED | OUTPATIENT
Start: 2024-09-16

## 2024-09-16 SDOH — ECONOMIC STABILITY: FOOD INSECURITY: WITHIN THE PAST 12 MONTHS, THE FOOD YOU BOUGHT JUST DIDN'T LAST AND YOU DIDN'T HAVE MONEY TO GET MORE.: PATIENT DECLINED

## 2024-09-16 SDOH — ECONOMIC STABILITY: FOOD INSECURITY: WITHIN THE PAST 12 MONTHS, YOU WORRIED THAT YOUR FOOD WOULD RUN OUT BEFORE YOU GOT MONEY TO BUY MORE.: PATIENT DECLINED

## 2024-09-16 SDOH — ECONOMIC STABILITY: INCOME INSECURITY: HOW HARD IS IT FOR YOU TO PAY FOR THE VERY BASICS LIKE FOOD, HOUSING, MEDICAL CARE, AND HEATING?: NOT HARD AT ALL

## 2024-09-16 SDOH — ECONOMIC STABILITY - INCOME SECURITY: OTHER PROBLEMS RELATED TO HOUSING AND ECONOMIC CIRCUMSTANCES: Z59.89

## 2024-09-16 ASSESSMENT — PATIENT HEALTH QUESTIONNAIRE - PHQ9
SUM OF ALL RESPONSES TO PHQ QUESTIONS 1-9: 0
1. LITTLE INTEREST OR PLEASURE IN DOING THINGS: NOT AT ALL
SUM OF ALL RESPONSES TO PHQ9 QUESTIONS 1 & 2: 0
2. FEELING DOWN, DEPRESSED OR HOPELESS: NOT AT ALL

## 2024-09-17 LAB
ALBUMIN SERPL-MCNC: 2.6 G/DL (ref 3.5–5)
ALBUMIN/GLOB SERPL: 0.7 (ref 1.1–2.2)
ALP SERPL-CCNC: 157 U/L (ref 45–117)
ALT SERPL-CCNC: 18 U/L (ref 12–78)
ANION GAP SERPL CALC-SCNC: 6 MMOL/L (ref 2–12)
AST SERPL-CCNC: 13 U/L (ref 15–37)
BILIRUB SERPL-MCNC: 0.2 MG/DL (ref 0.2–1)
BUN SERPL-MCNC: 8 MG/DL (ref 6–20)
BUN/CREAT SERPL: 15 (ref 12–20)
CALCIUM SERPL-MCNC: 9.3 MG/DL (ref 8.5–10.1)
CHLORIDE SERPL-SCNC: 103 MMOL/L (ref 97–108)
CO2 SERPL-SCNC: 25 MMOL/L (ref 21–32)
CREAT SERPL-MCNC: 0.53 MG/DL (ref 0.55–1.02)
CREAT UR-MCNC: 20.6 MG/DL
ERYTHROCYTE [DISTWIDTH] IN BLOOD BY AUTOMATED COUNT: 14 % (ref 11.5–14.5)
GLOBULIN SER CALC-MCNC: 4 G/DL (ref 2–4)
GLUCOSE 1H P 100 G GLC PO SERPL-MCNC: 181 MG/DL (ref 65–140)
GLUCOSE SERPL-MCNC: 195 MG/DL (ref 65–100)
HBV SURFACE AB SER QL: NONREACTIVE
HBV SURFACE AB SER-ACNC: 3.21 MIU/ML
HBV SURFACE AG SER QL: 0.24 INDEX
HBV SURFACE AG SER QL: NEGATIVE
HCT VFR BLD AUTO: 35.9 % (ref 35–47)
HCV AB SER IA-ACNC: 0.05 INDEX
HCV AB SERPL QL IA: NONREACTIVE
HGB BLD-MCNC: 11.1 G/DL (ref 11.5–16)
MCH RBC QN AUTO: 28.3 PG (ref 26–34)
MCHC RBC AUTO-ENTMCNC: 30.9 G/DL (ref 30–36.5)
MCV RBC AUTO: 91.6 FL (ref 80–99)
NRBC # BLD: 0 K/UL (ref 0–0.01)
NRBC BLD-RTO: 0 PER 100 WBC
PLATELET # BLD AUTO: 346 K/UL (ref 150–400)
PMV BLD AUTO: 11.7 FL (ref 8.9–12.9)
POTASSIUM SERPL-SCNC: 4.6 MMOL/L (ref 3.5–5.1)
PROT SERPL-MCNC: 6.6 G/DL (ref 6.4–8.2)
PROT UR-MCNC: <5 MG/DL (ref 0–11.9)
PROT/CREAT UR-RTO: <0.2
RBC # BLD AUTO: 3.92 M/UL (ref 3.8–5.2)
SODIUM SERPL-SCNC: 134 MMOL/L (ref 136–145)
WBC # BLD AUTO: 10.1 K/UL (ref 3.6–11)

## 2024-09-18 ENCOUNTER — TELEPHONE (OUTPATIENT)
Age: 29
End: 2024-09-18

## 2024-09-18 DIAGNOSIS — R73.09 ELEVATED GLUCOSE TOLERANCE TEST: Primary | ICD-10-CM

## 2024-09-18 LAB
HBV CORE AB SERPL QL IA: NEGATIVE
VZV IGG SER IA-ACNC: 257 INDEX

## 2024-09-23 ENCOUNTER — TELEPHONE (OUTPATIENT)
Age: 29
End: 2024-09-23

## 2024-09-25 ENCOUNTER — TELEPHONE (OUTPATIENT)
Age: 29
End: 2024-09-25

## 2024-09-25 ENCOUNTER — LAB (OUTPATIENT)
Age: 29
End: 2024-09-25

## 2024-09-25 DIAGNOSIS — R51.9 PREGNANCY HEADACHE IN SECOND TRIMESTER: Primary | ICD-10-CM

## 2024-09-25 DIAGNOSIS — O26.892 PREGNANCY HEADACHE IN SECOND TRIMESTER: ICD-10-CM

## 2024-09-25 DIAGNOSIS — O26.892 PREGNANCY HEADACHE IN SECOND TRIMESTER: Primary | ICD-10-CM

## 2024-09-25 DIAGNOSIS — R51.9 PREGNANCY HEADACHE IN SECOND TRIMESTER: ICD-10-CM

## 2024-09-25 DIAGNOSIS — R73.09 ELEVATED GLUCOSE TOLERANCE TEST: ICD-10-CM

## 2024-09-26 DIAGNOSIS — O24.414 INSULIN CONTROLLED GESTATIONAL DIABETES MELLITUS (GDM) DURING PREGNANCY: ICD-10-CM

## 2024-09-26 DIAGNOSIS — O24.419 GESTATIONAL DIABETES MELLITUS (GDM), ANTEPARTUM, GESTATIONAL DIABETES METHOD OF CONTROL UNSPECIFIED: Primary | ICD-10-CM

## 2024-09-26 LAB
ALBUMIN SERPL-MCNC: 2.5 G/DL (ref 3.5–5)
ALBUMIN/GLOB SERPL: 0.7 (ref 1.1–2.2)
ALP SERPL-CCNC: 158 U/L (ref 45–117)
ALT SERPL-CCNC: 21 U/L (ref 12–78)
ANION GAP SERPL CALC-SCNC: 7 MMOL/L (ref 2–12)
AST SERPL-CCNC: 19 U/L (ref 15–37)
BILIRUB SERPL-MCNC: 0.2 MG/DL (ref 0.2–1)
BUN SERPL-MCNC: 7 MG/DL (ref 6–20)
BUN/CREAT SERPL: 12 (ref 12–20)
CALCIUM SERPL-MCNC: 9 MG/DL (ref 8.5–10.1)
CHLORIDE SERPL-SCNC: 106 MMOL/L (ref 97–108)
CO2 SERPL-SCNC: 24 MMOL/L (ref 21–32)
CREAT SERPL-MCNC: 0.57 MG/DL (ref 0.55–1.02)
CREAT UR-MCNC: 21.3 MG/DL
ERYTHROCYTE [DISTWIDTH] IN BLOOD BY AUTOMATED COUNT: 13.9 % (ref 11.5–14.5)
GESTATIONAL 3HR GTT: ABNORMAL
GLOBULIN SER CALC-MCNC: 3.8 G/DL (ref 2–4)
GLUCOSE 1H P 100 G GLC PO SERPL-MCNC: 193 MG/DL (ref 65–180)
GLUCOSE 2 HOUR: 233 MG/DL (ref 65–155)
GLUCOSE P FAST SERPL-MCNC: 104 MG/DL (ref 65–95)
GLUCOSE SERPL-MCNC: 193 MG/DL (ref 65–100)
GLUCOSE, 3 HOUR: 194 MG/DL (ref 65–140)
HCT VFR BLD AUTO: 34.1 % (ref 35–47)
HGB BLD-MCNC: 10.8 G/DL (ref 11.5–16)
MCH RBC QN AUTO: 28.1 PG (ref 26–34)
MCHC RBC AUTO-ENTMCNC: 31.7 G/DL (ref 30–36.5)
MCV RBC AUTO: 88.8 FL (ref 80–99)
NRBC # BLD: 0 K/UL (ref 0–0.01)
NRBC BLD-RTO: 0 PER 100 WBC
PLATELET # BLD AUTO: 328 K/UL (ref 150–400)
PMV BLD AUTO: 11.8 FL (ref 8.9–12.9)
POTASSIUM SERPL-SCNC: 4.2 MMOL/L (ref 3.5–5.1)
PROT SERPL-MCNC: 6.3 G/DL (ref 6.4–8.2)
PROT UR-MCNC: <5 MG/DL (ref 0–11.9)
PROT/CREAT UR-RTO: <0.2
RBC # BLD AUTO: 3.84 M/UL (ref 3.8–5.2)
SODIUM SERPL-SCNC: 137 MMOL/L (ref 136–145)
WBC # BLD AUTO: 10 K/UL (ref 3.6–11)

## 2024-09-26 RX ORDER — LANCETS 30 GAUGE
1 EACH MISCELLANEOUS DAILY
Qty: 100 EACH | Refills: 5 | Status: SHIPPED | OUTPATIENT
Start: 2024-09-26

## 2024-09-26 RX ORDER — GLUCOSAMINE HCL/CHONDROITIN SU 500-400 MG
CAPSULE ORAL
Qty: 90 STRIP | Refills: 1 | Status: SHIPPED | OUTPATIENT
Start: 2024-09-26

## 2024-10-07 ENCOUNTER — ROUTINE PRENATAL (OUTPATIENT)
Age: 29
End: 2024-10-07
Payer: MEDICAID

## 2024-10-07 VITALS
DIASTOLIC BLOOD PRESSURE: 86 MMHG | SYSTOLIC BLOOD PRESSURE: 128 MMHG | BODY MASS INDEX: 35.37 KG/M2 | RESPIRATION RATE: 18 BRPM | OXYGEN SATURATION: 98 % | HEIGHT: 64 IN | WEIGHT: 207.2 LBS | TEMPERATURE: 97.6 F | HEART RATE: 76 BPM

## 2024-10-07 VITALS — SYSTOLIC BLOOD PRESSURE: 117 MMHG | HEART RATE: 72 BPM | DIASTOLIC BLOOD PRESSURE: 79 MMHG

## 2024-10-07 DIAGNOSIS — Z3A.30 30 WEEKS GESTATION OF PREGNANCY: Primary | ICD-10-CM

## 2024-10-07 DIAGNOSIS — O24.419 GESTATIONAL DIABETES MELLITUS (GDM), ANTEPARTUM, GESTATIONAL DIABETES METHOD OF CONTROL UNSPECIFIED: ICD-10-CM

## 2024-10-07 PROCEDURE — 90746 HEPB VACCINE 3 DOSE ADULT IM: CPT

## 2024-10-07 PROCEDURE — 0502F SUBSEQUENT PRENATAL CARE: CPT

## 2024-10-07 PROCEDURE — 76811 OB US DETAILED SNGL FETUS: CPT | Performed by: STUDENT IN AN ORGANIZED HEALTH CARE EDUCATION/TRAINING PROGRAM

## 2024-10-07 PROCEDURE — 99205 OFFICE O/P NEW HI 60 MIN: CPT | Performed by: STUDENT IN AN ORGANIZED HEALTH CARE EDUCATION/TRAINING PROGRAM

## 2024-10-07 PROCEDURE — PBSHW HEP B, ENGERIX-B, (AGE 20 YRS+), IM, 1ML, 3-DOSE

## 2024-10-07 PROCEDURE — 76819 FETAL BIOPHYS PROFIL W/O NST: CPT | Performed by: STUDENT IN AN ORGANIZED HEALTH CARE EDUCATION/TRAINING PROGRAM

## 2024-10-07 PROCEDURE — 76820 UMBILICAL ARTERY ECHO: CPT | Performed by: STUDENT IN AN ORGANIZED HEALTH CARE EDUCATION/TRAINING PROGRAM

## 2024-10-07 ASSESSMENT — PATIENT HEALTH QUESTIONNAIRE - PHQ9
SUM OF ALL RESPONSES TO PHQ QUESTIONS 1-9: 0
2. FEELING DOWN, DEPRESSED OR HOPELESS: NOT AT ALL
SUM OF ALL RESPONSES TO PHQ QUESTIONS 1-9: 0
SUM OF ALL RESPONSES TO PHQ QUESTIONS 1-9: 0
SUM OF ALL RESPONSES TO PHQ9 QUESTIONS 1 & 2: 0
1. LITTLE INTEREST OR PLEASURE IN DOING THINGS: NOT AT ALL
SUM OF ALL RESPONSES TO PHQ QUESTIONS 1-9: 0

## 2024-10-07 NOTE — PROGRESS NOTES
Patient was seen 10/7/2024      Please look under media to view full consult and ultrasound report in ViewPoint.         Karen Merritt MD   Maternal Fetal Medicine

## 2024-10-07 NOTE — PROCEDURES
Over the last 2 weeks, how often have you been bothered by the following problems?          PHQ2 Score: 0  PHQ2 Score Interpretation: No further screening needed  1. Little interest or pleasure in activity?: 0  2. Feeling down, depressed, or hopeless?: 0       Health Maintenance Due   Topic Date Due   • Pneumococcal Vaccine 0-64 (1 of 4 - PCV13) Never done   • Influenza Vaccine (1) 08/01/2021   • Depression Screening  12/16/2021       Patient is due for topics listed above, she wishes to proceed with Depression Screening , but is not proceeding with Immunization(s) Influenza and Pneumococcal at this time. The following has occurred: Declining pneumonia vaccine at this time. Flu vaccine not yet available.            PATIENT: TIA MEDINA   -  : 1995   -  DOS:10/07/2024   -  INTERPRETING PROVIDER:Karen Merritt,   Indication  ========    Gestational diabetes, FGR (10/7)    Method  ======    Transabdominal ultrasound examination. View: Sufficient    Dating  ======    LMP on: 2024  GA by LMP 32 w + 2 d  LEILANI by LMP: 2024  Prior assessment by: From outside records  GA by prior assessment 30 w + 1 d  LEILANI by prior assessment: 12/15/2024  Ultrasound examination on: 10/7/2024  GA by U/S based upon: AC, BPD, Femur, HC  GA by U/S 29 w + 3 d  LEILANI by U/S: 2024  Assigned: based on stated LEILANI (From outside records), selected on 10/7/2024  Assigned GA 30 w + 1 d  Assigned LEILANI: 12/15/2024    Fetal Growth Overview  =================    Exam date        GA              BPD (mm)        HC (mm)             AC (mm)            FL (mm)             HL (mm)           EFW (g)  10/7/2024          30w 1d        76    49%         272.4     9%        241.5    7%        54.9     10%        46.4    2%        1298     9%    Fetal Biometry  ============    Standard  BPD 76.0 mm 30w 3d 49% Hadlock  OFD 95.2 mm 30w 5d 66% Joshua  .4 mm 29w 5d 9% Hadlock  Cerebellum tr 34.5 mm 29w 0d 8% Hill  .5 mm 28w 3d 7% Hadlock  Femur 54.9 mm 29w 0d 10% Hadlock  Humerus 46.4 mm 27w 3d 2% Joshua  EFW 1,298 g 28w 4d 9% Hadlock  EFW (lb) 2 lb  EFW (oz) 14 oz  EFW by: Hadlock (BPD-HC-AC-FL)  Extended   4.0 mm  CM 8.0 mm  76% Nicolaides  Nasal bone 8.4 mm  Head / Face / Neck  Nasal bone: present  Other Structures   bpm    General Evaluation  ==============    Cardiac activity present.  bpm. Fetal movements: visualized. Presentation: Cephalic  Placenta: Placental site: posterior  Umbilical cord: Cord vessels: 3 vessel cord. Insertion site: central  Amniotic fluid: Amount of AF: normal. MVP 3.8 cm. HASEEB 10.6 cm. Q1 1.8 cm, Q2 3.8 cm, Q3 2.3 cm, Q4 2.8 cm    Fetal Anatomy  ===========    Cranium: normal  Lateral

## 2024-10-07 NOTE — PROGRESS NOTES
I discussed with the resident the medical history and the resident's findings on physical exam. I discussed with the resident the patient's diagnosis and agree with the plan of care.     FB-118 (4 above goal in 2 weeks)  2hr PP:  (3 above goal in 2 weeks)    27yo  at 30w1d by prior dating (awaiting records)     IUP: Rh pos  low risk NIPT, AFP low risk  HgB 10.8  s/p Tdap, declined flu  needs pap   Transfer of Care: from [facility], labs to be scanned in and records requested (has had anatomy?)  GDM  Pre-DM: A1C 5.7  first visit reviewing BG logs, reasonably at goal, reinforced dietary changes (less bread)  referred to DM education, not yet seen  MFM visit today  Multiple OB Triage Visits: multiple with insurance gaps (vaginitis, URIs, UTIs)  History of Elevated BP: in prior pregnancy, unclear if PIH or not  baseline PIH UPC neg, labs okay   History of  Delivery: PTL around 36w per patient ?  Hep B NI: 1st 10/7     Meeting with AVERY/LUDY today   Estimated Date of Delivery: 12/15/24

## 2024-10-07 NOTE — PROGRESS NOTES
Chief Complaint   Patient presents with    Routine Prenatal Visit    Darshana Tim #[id number]     Patient identified by name and . Patient is a  at 30w1d.    Taking prenatal vitamins: Yes  Leakage of fluid: No  Vaginal bleeding: No  Feeling baby move if over 20 weeks: Yes  Contractions: No  Pain: Yes, started 4 days ago to the pelvic area.  She states that the pain comes and goes. Usually happens when she is laying down.     Vitals:    10/07/24 1032   BP: 128/86   Site: Right Upper Arm   Position: Sitting   Cuff Size: Large Adult   Pulse: 76   Resp: 18   Temp: 97.6 °F (36.4 °C)   TempSrc: Oral   SpO2: 98%   Weight: 94 kg (207 lb 3.2 oz)   Height: 1.626 m (5' 4\")        Immunization History   Administered Date(s) Administered    TDaP, ADACEL (age 10y-64y), BOOSTRIX (age 10y+), IM, 0.5mL 2024       1. Have you been to the ER, urgent care clinic since your last visit?  Hospitalized since your last visit?No    2. Have you seen or consulted any other health care providers outside of the [facility] since your last visit?  Include any pap smears or colon screening. No

## 2024-10-07 NOTE — PROGRESS NOTES
[address] [place] [address]   Office [phone], Fax [phone]  Return OB Visit     Aurelio Carreon interpretor used due to language barrier  Subjective:   Rochelle Giraldo 28 y.o.   LEILANI: 12/15/2024, by Other Basis  GA:  30w1d.      Concerns today: none     Blood glucose logs reviewed  Fasting B-118 ( 4 above goal in 2 weeks)  2 hr post prandrial: 80- 132 (3 above goal in 2 weeks)    LOF: no  Vaginal bleeding: no  Fetal movement (after 20 weeks): yes  Contractions: no    Pt denies fever, chills, HA, vision disturbances, RUQ pain, chest pain, SOB, N/V/D, constipation, urinary problems, foul smelling vaginal discharge, LE Edema worse than usual.     OB History    Para Term  AB Living   3 1 0 1 1 1   SAB IAB Ectopic Molar Multiple Live Births   1         1      # Outcome Date GA Lbr Benedict/2nd Weight Sex Type Anes PTL Lv   3 Current            2 SAB 2023 12w0d          1  08/10/11    F    TAYLOR      Obstetric Comments     at 8 months gestation   First trimester TAB       Allergies- reviewed:   No Known Allergies  Medications- reviewed:   Current Outpatient Medications   Medication Sig    blood glucose monitor kit and supplies Dispense sufficient amount for indicated testing frequency plus additional to accommodate PRN testing needs. Dispense all needed supplies to include: monitor, strips, lancing device, lancets, control solutions, alcohol swabs.    Lancets MISC 1 each by Does not apply route daily    blood glucose monitor strips Test 4 times a day & as needed for symptoms of irregular blood glucose. Dispense sufficient amount for indicated testing frequency plus additional to accommodate PRN testing needs.    folic acid (FOLVITE) 1 MG tablet Take 1 tablet by mouth daily    Prenatal Vit-Fe Fumarate-FA (PRENATAL VITAMINS PO) Take by mouth    aspirin 81 MG EC tablet Take 1 tablet by mouth daily    ondansetron (ZOFRAN-ODT) 4 MG disintegrating

## 2024-10-16 ENCOUNTER — ROUTINE PRENATAL (OUTPATIENT)
Age: 29
End: 2024-10-16
Payer: MEDICAID

## 2024-10-16 VITALS — SYSTOLIC BLOOD PRESSURE: 130 MMHG | DIASTOLIC BLOOD PRESSURE: 89 MMHG | HEART RATE: 67 BPM

## 2024-10-16 DIAGNOSIS — Z3A.31 31 WEEKS GESTATION OF PREGNANCY: Primary | ICD-10-CM

## 2024-10-16 PROCEDURE — 76820 UMBILICAL ARTERY ECHO: CPT | Performed by: OBSTETRICS & GYNECOLOGY

## 2024-10-16 PROCEDURE — 76819 FETAL BIOPHYS PROFIL W/O NST: CPT | Performed by: OBSTETRICS & GYNECOLOGY

## 2024-10-17 NOTE — PROCEDURES
clinical dates.  Amniotic fluid: normal.  Placenta is posterior.  Biophysical profile score is 8/8.  Cephalic presentation.    Umbilical Artery Doppler studies were performed and positive end diastolic flow was demonstrated with normal resistance for the given gestational age.    Consultation  ==========    WALLY is 28 yrs of age, :    1)FGR  - Normal NIPT, declined further diagnostic testing    2)Gestational Diabetes:  - managed by primary ob  - consult with the diabetes educator was recommended  - importance of BG log and submitting each week to monitor BG was reviewed    Recommendations  ==============    -  testing: weekly BPP and UA dopplers  - Growth scans q3 weeks  - Del at 38.0-39.0 unless other clinical indications arise    Coding  ======    Code: 66251  Description: Fetal biophysical profile; without non-stress testing  Code: 51037  Description: Doppler velocimetry, fetal; umbilical artery

## 2024-10-20 ENCOUNTER — HOSPITAL ENCOUNTER (INPATIENT)
Facility: HOSPITAL | Age: 29
LOS: 5 days | Discharge: HOME | DRG: 540 | End: 2024-10-25
Attending: FAMILY MEDICINE | Admitting: STUDENT IN AN ORGANIZED HEALTH CARE EDUCATION/TRAINING PROGRAM
Payer: MEDICAID

## 2024-10-20 DIAGNOSIS — R07.9 CHEST PAIN, UNSPECIFIED TYPE: Primary | ICD-10-CM

## 2024-10-20 PROBLEM — Z3A.32 PREGNANCY WITH 32 COMPLETED WEEKS GESTATION: Status: ACTIVE | Noted: 2024-10-20

## 2024-10-20 LAB
ABO + RH BLD: NORMAL
ALBUMIN SERPL-MCNC: 2.2 G/DL (ref 3.5–5)
ALBUMIN/GLOB SERPL: 0.6 (ref 1.1–2.2)
ALP SERPL-CCNC: 200 U/L (ref 45–117)
ALT SERPL-CCNC: 19 U/L (ref 12–78)
ANION GAP SERPL CALC-SCNC: 9 MMOL/L (ref 2–12)
AST SERPL-CCNC: 26 U/L (ref 15–37)
BILIRUB SERPL-MCNC: 0.2 MG/DL (ref 0.2–1)
BLOOD GROUP ANTIBODIES SERPL: NORMAL
BUN SERPL-MCNC: 14 MG/DL (ref 6–20)
BUN/CREAT SERPL: 18 (ref 12–20)
CALCIUM SERPL-MCNC: 8.3 MG/DL (ref 8.5–10.1)
CHLORIDE SERPL-SCNC: 107 MMOL/L (ref 97–108)
CO2 SERPL-SCNC: 20 MMOL/L (ref 21–32)
CREAT SERPL-MCNC: 0.79 MG/DL (ref 0.55–1.02)
CREAT UR-MCNC: 46 MG/DL
ERYTHROCYTE [DISTWIDTH] IN BLOOD BY AUTOMATED COUNT: 14.4 % (ref 11.5–14.5)
FIBRINOGEN PPP-MCNC: 426 MG/DL (ref 200–475)
GLOBULIN SER CALC-MCNC: 3.5 G/DL (ref 2–4)
GLUCOSE SERPL-MCNC: 92 MG/DL (ref 65–100)
HCT VFR BLD AUTO: 33.8 % (ref 35–47)
HGB BLD-MCNC: 11.4 G/DL (ref 11.5–16)
LDH SERPL L TO P-CCNC: 355 U/L (ref 81–246)
LDH SERPL L TO P-CCNC: 372 U/L (ref 81–246)
MAGNESIUM SERPL-MCNC: 4.5 MG/DL (ref 1.6–2.4)
MCH RBC QN AUTO: 28.3 PG (ref 26–34)
MCHC RBC AUTO-ENTMCNC: 33.7 G/DL (ref 30–36.5)
MCV RBC AUTO: 83.9 FL (ref 80–99)
NRBC # BLD: 0 K/UL (ref 0–0.01)
NRBC BLD-RTO: 0 PER 100 WBC
PLATELET # BLD AUTO: 300 K/UL (ref 150–400)
PMV BLD AUTO: 12.9 FL (ref 8.9–12.9)
POTASSIUM SERPL-SCNC: 4.3 MMOL/L (ref 3.5–5.1)
PROT SERPL-MCNC: 5.7 G/DL (ref 6.4–8.2)
PROT UR-MCNC: 656 MG/DL (ref 0–11.9)
PROT/CREAT UR-RTO: 14.3
RBC # BLD AUTO: 4.03 M/UL (ref 3.8–5.2)
SODIUM SERPL-SCNC: 136 MMOL/L (ref 136–145)
SPECIMEN EXP DATE BLD: NORMAL
WBC # BLD AUTO: 10.5 K/UL (ref 3.6–11)

## 2024-10-20 PROCEDURE — 85384 FIBRINOGEN ACTIVITY: CPT

## 2024-10-20 PROCEDURE — 94761 N-INVAS EAR/PLS OXIMETRY MLT: CPT

## 2024-10-20 PROCEDURE — 86850 RBC ANTIBODY SCREEN: CPT

## 2024-10-20 PROCEDURE — 82570 ASSAY OF URINE CREATININE: CPT

## 2024-10-20 PROCEDURE — 86900 BLOOD TYPING SEROLOGIC ABO: CPT

## 2024-10-20 PROCEDURE — 36415 COLL VENOUS BLD VENIPUNCTURE: CPT

## 2024-10-20 PROCEDURE — G0378 HOSPITAL OBSERVATION PER HR: HCPCS

## 2024-10-20 PROCEDURE — 6370000000 HC RX 637 (ALT 250 FOR IP)

## 2024-10-20 PROCEDURE — G0379 DIRECT REFER HOSPITAL OBSERV: HCPCS

## 2024-10-20 PROCEDURE — 86901 BLOOD TYPING SEROLOGIC RH(D): CPT

## 2024-10-20 PROCEDURE — 6360000002 HC RX W HCPCS

## 2024-10-20 PROCEDURE — 51702 INSERT TEMP BLADDER CATH: CPT

## 2024-10-20 PROCEDURE — 80053 COMPREHEN METABOLIC PANEL: CPT

## 2024-10-20 PROCEDURE — 2580000003 HC RX 258: Performed by: STUDENT IN AN ORGANIZED HEALTH CARE EDUCATION/TRAINING PROGRAM

## 2024-10-20 PROCEDURE — 6360000002 HC RX W HCPCS: Performed by: STUDENT IN AN ORGANIZED HEALTH CARE EDUCATION/TRAINING PROGRAM

## 2024-10-20 PROCEDURE — 1100000000 HC RM PRIVATE

## 2024-10-20 PROCEDURE — 83735 ASSAY OF MAGNESIUM: CPT

## 2024-10-20 PROCEDURE — 83615 LACTATE (LD) (LDH) ENZYME: CPT

## 2024-10-20 PROCEDURE — 85027 COMPLETE CBC AUTOMATED: CPT

## 2024-10-20 PROCEDURE — 84156 ASSAY OF PROTEIN URINE: CPT

## 2024-10-20 PROCEDURE — 6370000000 HC RX 637 (ALT 250 FOR IP): Performed by: STUDENT IN AN ORGANIZED HEALTH CARE EDUCATION/TRAINING PROGRAM

## 2024-10-20 RX ORDER — ACETAMINOPHEN 325 MG/1
650 TABLET ORAL EVERY 4 HOURS PRN
Status: DISCONTINUED | OUTPATIENT
Start: 2024-10-20 | End: 2024-10-23

## 2024-10-20 RX ORDER — ONDANSETRON 4 MG/1
4 TABLET, ORALLY DISINTEGRATING ORAL EVERY 6 HOURS PRN
Status: DISCONTINUED | OUTPATIENT
Start: 2024-10-20 | End: 2024-10-23

## 2024-10-20 RX ORDER — NIFEDIPINE 10 MG/1
10 CAPSULE ORAL EVERY 8 HOURS SCHEDULED
Status: DISCONTINUED | OUTPATIENT
Start: 2024-10-20 | End: 2024-10-20

## 2024-10-20 RX ORDER — ACETAMINOPHEN 500 MG
1000 TABLET ORAL EVERY 6 HOURS PRN
Status: DISCONTINUED | OUTPATIENT
Start: 2024-10-20 | End: 2024-10-25 | Stop reason: HOSPADM

## 2024-10-20 RX ORDER — DIPHENHYDRAMINE HCL 25 MG
25 CAPSULE ORAL NIGHTLY PRN
Status: DISCONTINUED | OUTPATIENT
Start: 2024-10-20 | End: 2024-10-25 | Stop reason: HOSPADM

## 2024-10-20 RX ORDER — SUMATRIPTAN 6 MG/.5ML
6 INJECTION, SOLUTION SUBCUTANEOUS ONCE
Status: DISCONTINUED | OUTPATIENT
Start: 2024-10-20 | End: 2024-10-20

## 2024-10-20 RX ORDER — TRANEXAMIC ACID 10 MG/ML
1000 INJECTION, SOLUTION INTRAVENOUS
Status: ACTIVE | OUTPATIENT
Start: 2024-10-20 | End: 2024-10-21

## 2024-10-20 RX ORDER — BETAMETHASONE SODIUM PHOSPHATE AND BETAMETHASONE ACETATE 3; 3 MG/ML; MG/ML
12 INJECTION, SUSPENSION INTRA-ARTICULAR; INTRALESIONAL; INTRAMUSCULAR; SOFT TISSUE DAILY
Status: DISCONTINUED | OUTPATIENT
Start: 2024-10-20 | End: 2024-10-21

## 2024-10-20 RX ORDER — SODIUM CHLORIDE 0.9 % (FLUSH) 0.9 %
5-40 SYRINGE (ML) INJECTION EVERY 12 HOURS SCHEDULED
Status: DISCONTINUED | OUTPATIENT
Start: 2024-10-20 | End: 2024-10-25 | Stop reason: HOSPADM

## 2024-10-20 RX ORDER — MAGNESIUM SULFATE HEPTAHYDRATE 40 MG/ML
4000 INJECTION, SOLUTION INTRAVENOUS ONCE
Status: COMPLETED | OUTPATIENT
Start: 2024-10-20 | End: 2024-10-20

## 2024-10-20 RX ORDER — SODIUM CHLORIDE, SODIUM LACTATE, POTASSIUM CHLORIDE, CALCIUM CHLORIDE 600; 310; 30; 20 MG/100ML; MG/100ML; MG/100ML; MG/100ML
INJECTION, SOLUTION INTRAVENOUS CONTINUOUS
Status: DISCONTINUED | OUTPATIENT
Start: 2024-10-20 | End: 2024-10-22

## 2024-10-20 RX ORDER — ONDANSETRON 2 MG/ML
4 INJECTION INTRAMUSCULAR; INTRAVENOUS EVERY 6 HOURS PRN
Status: DISCONTINUED | OUTPATIENT
Start: 2024-10-20 | End: 2024-10-23

## 2024-10-20 RX ORDER — ACETAMINOPHEN 325 MG/1
650 TABLET ORAL EVERY 4 HOURS PRN
Status: DISCONTINUED | OUTPATIENT
Start: 2024-10-20 | End: 2024-10-20

## 2024-10-20 RX ORDER — SODIUM CHLORIDE 0.9 % (FLUSH) 0.9 %
5-40 SYRINGE (ML) INJECTION PRN
Status: DISCONTINUED | OUTPATIENT
Start: 2024-10-20 | End: 2024-10-25 | Stop reason: HOSPADM

## 2024-10-20 RX ORDER — ACETAMINOPHEN 500 MG
1000 TABLET ORAL EVERY 4 HOURS PRN
Status: DISCONTINUED | OUTPATIENT
Start: 2024-10-20 | End: 2024-10-20

## 2024-10-20 RX ORDER — NIFEDIPINE 10 MG/1
CAPSULE ORAL
Status: COMPLETED
Start: 2024-10-20 | End: 2024-10-20

## 2024-10-20 RX ORDER — HYDRALAZINE HYDROCHLORIDE 20 MG/ML
10 INJECTION INTRAMUSCULAR; INTRAVENOUS
Status: DISPENSED | OUTPATIENT
Start: 2024-10-20 | End: 2024-10-21

## 2024-10-20 RX ORDER — LABETALOL HYDROCHLORIDE 5 MG/ML
20 INJECTION, SOLUTION INTRAVENOUS
Status: COMPLETED | OUTPATIENT
Start: 2024-10-20 | End: 2024-10-20

## 2024-10-20 RX ORDER — BETAMETHASONE SODIUM PHOSPHATE AND BETAMETHASONE ACETATE 3; 3 MG/ML; MG/ML
12 INJECTION, SUSPENSION INTRA-ARTICULAR; INTRALESIONAL; INTRAMUSCULAR; SOFT TISSUE DAILY
Status: DISCONTINUED | OUTPATIENT
Start: 2024-10-20 | End: 2024-10-20

## 2024-10-20 RX ORDER — METHYLERGONOVINE MALEATE 0.2 MG/ML
200 INJECTION INTRAVENOUS PRN
Status: DISCONTINUED | OUTPATIENT
Start: 2024-10-20 | End: 2024-10-25 | Stop reason: HOSPADM

## 2024-10-20 RX ORDER — SODIUM CHLORIDE 9 MG/ML
INJECTION, SOLUTION INTRAVENOUS PRN
Status: DISCONTINUED | OUTPATIENT
Start: 2024-10-20 | End: 2024-10-25 | Stop reason: HOSPADM

## 2024-10-20 RX ORDER — CALCIUM GLUCONATE 94 MG/ML
1000 INJECTION, SOLUTION INTRAVENOUS PRN
Status: DISCONTINUED | OUTPATIENT
Start: 2024-10-20 | End: 2024-10-25 | Stop reason: HOSPADM

## 2024-10-20 RX ORDER — MISOPROSTOL 200 UG/1
400 TABLET ORAL PRN
Status: DISCONTINUED | OUTPATIENT
Start: 2024-10-20 | End: 2024-10-25 | Stop reason: HOSPADM

## 2024-10-20 RX ORDER — LABETALOL HYDROCHLORIDE 5 MG/ML
40 INJECTION, SOLUTION INTRAVENOUS
Status: ACTIVE | OUTPATIENT
Start: 2024-10-20 | End: 2024-10-21

## 2024-10-20 RX ORDER — LABETALOL HYDROCHLORIDE 5 MG/ML
80 INJECTION, SOLUTION INTRAVENOUS
Status: ACTIVE | OUTPATIENT
Start: 2024-10-20 | End: 2024-10-21

## 2024-10-20 RX ORDER — NIFEDIPINE 10 MG/1
10 CAPSULE ORAL EVERY 8 HOURS SCHEDULED
Status: COMPLETED | OUTPATIENT
Start: 2024-10-20 | End: 2024-10-21

## 2024-10-20 RX ORDER — TERBUTALINE SULFATE 1 MG/ML
0.25 INJECTION, SOLUTION SUBCUTANEOUS
Status: ACTIVE | OUTPATIENT
Start: 2024-10-20 | End: 2024-10-21

## 2024-10-20 RX ORDER — BUTALBITAL, ACETAMINOPHEN AND CAFFEINE 50; 325; 40 MG/1; MG/1; MG/1
1 TABLET ORAL EVERY 4 HOURS PRN
Status: DISCONTINUED | OUTPATIENT
Start: 2024-10-20 | End: 2024-10-25 | Stop reason: HOSPADM

## 2024-10-20 RX ORDER — CARBOPROST TROMETHAMINE 250 UG/ML
250 INJECTION, SOLUTION INTRAMUSCULAR PRN
Status: DISCONTINUED | OUTPATIENT
Start: 2024-10-20 | End: 2024-10-25 | Stop reason: HOSPADM

## 2024-10-20 RX ADMIN — DIPHENHYDRAMINE HYDROCHLORIDE 25 MG: 25 CAPSULE ORAL at 23:04

## 2024-10-20 RX ADMIN — NIFEDIPINE 10 MG: 10 CAPSULE ORAL at 16:36

## 2024-10-20 RX ADMIN — ACETAMINOPHEN 1000 MG: 500 TABLET ORAL at 17:53

## 2024-10-20 RX ADMIN — BUTALBITAL, ACETAMINOPHEN, AND CAFFEINE 1 TABLET: 325; 50; 40 TABLET ORAL at 23:04

## 2024-10-20 RX ADMIN — MAGNESIUM SULFATE HEPTAHYDRATE 4000 MG: 40 INJECTION, SOLUTION INTRAVENOUS at 16:55

## 2024-10-20 RX ADMIN — SODIUM CHLORIDE, POTASSIUM CHLORIDE, SODIUM LACTATE AND CALCIUM CHLORIDE: 600; 310; 30; 20 INJECTION, SOLUTION INTRAVENOUS at 16:57

## 2024-10-20 RX ADMIN — MAGNESIUM SULFATE HEPTAHYDRATE 4000 MG: 40 INJECTION, SOLUTION INTRAVENOUS at 17:12

## 2024-10-20 RX ADMIN — BETAMETHASONE SODIUM PHOSPHATE AND BETAMETHASONE ACETATE 12 MG: 3; 3 INJECTION, SUSPENSION INTRA-ARTICULAR; INTRALESIONAL; INTRAMUSCULAR at 19:28

## 2024-10-20 RX ADMIN — MAGNESIUM SULFATE HEPTAHYDRATE 2000 MG/HR: 40 INJECTION, SOLUTION INTRAVENOUS at 17:36

## 2024-10-20 RX ADMIN — LABETALOL HYDROCHLORIDE 20 MG: 5 INJECTION, SOLUTION INTRAVENOUS at 17:04

## 2024-10-20 ASSESSMENT — PAIN SCALES - GENERAL
PAINLEVEL_OUTOF10: 8
PAINLEVEL_OUTOF10: 7

## 2024-10-20 ASSESSMENT — PAIN DESCRIPTION - ORIENTATION
ORIENTATION: ANTERIOR
ORIENTATION: ANTERIOR

## 2024-10-20 ASSESSMENT — PAIN DESCRIPTION - DESCRIPTORS: DESCRIPTORS: TIGHTNESS

## 2024-10-20 ASSESSMENT — PAIN DESCRIPTION - LOCATION
LOCATION: HEAD
LOCATION: HEAD

## 2024-10-20 ASSESSMENT — PAIN - FUNCTIONAL ASSESSMENT: PAIN_FUNCTIONAL_ASSESSMENT: ACTIVITIES ARE NOT PREVENTED

## 2024-10-20 NOTE — PROGRESS NOTES
1718: SBAR report received from DARIEN Pedro RN. Pt reporting chest pressure at this time. DARIEN Pedro RN to update Alex HAINES.     1900: Bedside and Verbal shift change report given to Cristina SAMSON (oncoming nurse) by Meena RN (offgoing nurse). Report included the following information Nurse Handoff Report, Adult Overview, Intake/Output, MAR, Recent Results, and Med Rec Status.

## 2024-10-20 NOTE — PROGRESS NOTES
1630 Pt arrived to L&D with the c/o ha, swelling, blurred vision and chest pressure.  Bp 179/113.  Orders received for pt to take Nifedipine 10 mg po per Dr Johnson.     1636 Nifedipine 10mg given po.    1649 PIV placed in left hand.    1704 Labetalol 20mg given ivp.  Bp 162/97.    1712 Magnesium sulfate 4gm bolus started. Pt educated on possible side effects.

## 2024-10-20 NOTE — H&P
[facility] [address] [place] [address]   Office [phone], Fax [phone]      History & Physical    Name: Rochelle Giraldo MRN: [id number]  SSN: [id number]    YOB: 1995  Age: 28 y.o.  Sex: female      Subjective:     Reason for Admission:  Pregnancy and severe pre-eclampsia    History of Present Illness: Ms. Beatriz Giraldo is a 28 y.o.   female with an estimated gestational age of 32w0d with Estimated Date of Delivery: 12/15/24. Patient complains of worsening HA, blurry vision, chest pressure, dyspnea, and face, BUE, BLE swelling for 2 days. POA BP 180s systolic.     Pregnancy has been complicated by A1GDM, prior history of elevated BP in prior pregnancy, history of late  delivery. Patient denies contractions, vaginal bleeding , vaginal leaking of fluid , and endorses good fetal movement .    OB History    Para Term  AB Living   3 1 0 1 1 1   SAB IAB Ectopic Molar Multiple Live Births   1         1      # Outcome Date GA Lbr Benedict/2nd Weight Sex Type Anes PTL Lv   3 Current            2 SAB 2023 12w0d          1  08/10/11 32w0d   F Vag-Spont   TAYLOR      Obstetric Comments     at 8 months gestation   First trimester TAB     Past Medical History:   Diagnosis Date    Anxiety     Depression     Gestational diabetes     Pregnancy due      Past Surgical History:   Procedure Laterality Date    DILATION AND CURETTAGE OF UTERUS      LIPOSUCTION       Social History     Occupational History    Not on file   Tobacco Use    Smoking status: Never     Passive exposure: Never    Smokeless tobacco: Never   Vaping Use    Vaping status: Never Used   Substance and Sexual Activity    Alcohol use: Not Currently    Drug use: No    Sexual activity: Yes     Partners: Male     Comment: denies h/o STIs      Family History   Problem Relation Age of Onset    No Known Problems Mother     No Known Problems Father

## 2024-10-21 ENCOUNTER — ANESTHESIA (OUTPATIENT)
Facility: HOSPITAL | Age: 29
End: 2024-10-21
Payer: MEDICAID

## 2024-10-21 ENCOUNTER — APPOINTMENT (OUTPATIENT)
Facility: HOSPITAL | Age: 29
DRG: 540 | End: 2024-10-21
Payer: MEDICAID

## 2024-10-21 ENCOUNTER — ANESTHESIA EVENT (OUTPATIENT)
Facility: HOSPITAL | Age: 29
End: 2024-10-21
Payer: MEDICAID

## 2024-10-21 PROBLEM — O14.93 PRE-ECLAMPSIA IN THIRD TRIMESTER: Status: ACTIVE | Noted: 2024-10-21

## 2024-10-21 PROBLEM — R07.9 CHEST PAIN: Status: ACTIVE | Noted: 2024-10-21

## 2024-10-21 PROBLEM — J81.0 ACUTE PULMONARY EDEMA: Status: ACTIVE | Noted: 2024-10-21

## 2024-10-21 LAB
ALBUMIN SERPL-MCNC: 2.1 G/DL (ref 3.5–5)
ALBUMIN SERPL-MCNC: 2.2 G/DL (ref 3.5–5)
ALBUMIN/GLOB SERPL: 0.6 (ref 1.1–2.2)
ALBUMIN/GLOB SERPL: 0.7 (ref 1.1–2.2)
ALP SERPL-CCNC: 177 U/L (ref 45–117)
ALP SERPL-CCNC: 232 U/L (ref 45–117)
ALT SERPL-CCNC: 18 U/L (ref 12–78)
ALT SERPL-CCNC: 20 U/L (ref 12–78)
ANION GAP SERPL CALC-SCNC: 10 MMOL/L (ref 2–12)
ANION GAP SERPL CALC-SCNC: 11 MMOL/L (ref 2–12)
AST SERPL-CCNC: 19 U/L (ref 15–37)
AST SERPL-CCNC: 24 U/L (ref 15–37)
BILIRUB SERPL-MCNC: 0.2 MG/DL (ref 0.2–1)
BILIRUB SERPL-MCNC: 0.2 MG/DL (ref 0.2–1)
BUN SERPL-MCNC: 18 MG/DL (ref 6–20)
BUN SERPL-MCNC: 21 MG/DL (ref 6–20)
BUN/CREAT SERPL: 19 (ref 12–20)
BUN/CREAT SERPL: 24 (ref 12–20)
CALCIUM SERPL-MCNC: 8.5 MG/DL (ref 8.5–10.1)
CALCIUM SERPL-MCNC: 8.9 MG/DL (ref 8.5–10.1)
CHLORIDE SERPL-SCNC: 104 MMOL/L (ref 97–108)
CHLORIDE SERPL-SCNC: 106 MMOL/L (ref 97–108)
CO2 SERPL-SCNC: 19 MMOL/L (ref 21–32)
CO2 SERPL-SCNC: 20 MMOL/L (ref 21–32)
CREAT SERPL-MCNC: 0.89 MG/DL (ref 0.55–1.02)
CREAT SERPL-MCNC: 0.96 MG/DL (ref 0.55–1.02)
ERYTHROCYTE [DISTWIDTH] IN BLOOD BY AUTOMATED COUNT: 14.2 % (ref 11.5–14.5)
ERYTHROCYTE [DISTWIDTH] IN BLOOD BY AUTOMATED COUNT: 14.5 % (ref 11.5–14.5)
FIBRINOGEN PPP-MCNC: 425 MG/DL (ref 200–475)
GLOBULIN SER CALC-MCNC: 3.1 G/DL (ref 2–4)
GLOBULIN SER CALC-MCNC: 3.9 G/DL (ref 2–4)
GLUCOSE BLD STRIP.AUTO-MCNC: 111 MG/DL (ref 65–117)
GLUCOSE BLD STRIP.AUTO-MCNC: 115 MG/DL (ref 65–117)
GLUCOSE BLD STRIP.AUTO-MCNC: 145 MG/DL (ref 65–117)
GLUCOSE BLD STRIP.AUTO-MCNC: 163 MG/DL (ref 65–117)
GLUCOSE BLD STRIP.AUTO-MCNC: 186 MG/DL (ref 65–117)
GLUCOSE BLD STRIP.AUTO-MCNC: 193 MG/DL (ref 65–117)
GLUCOSE SERPL-MCNC: 126 MG/DL (ref 65–100)
GLUCOSE SERPL-MCNC: 162 MG/DL (ref 65–100)
HCT VFR BLD AUTO: 33.7 % (ref 35–47)
HCT VFR BLD AUTO: 37.9 % (ref 35–47)
HGB BLD-MCNC: 11.2 G/DL (ref 11.5–16)
HGB BLD-MCNC: 12.5 G/DL (ref 11.5–16)
LDH SERPL L TO P-CCNC: 349 U/L (ref 81–246)
MAGNESIUM SERPL-MCNC: 6.6 MG/DL (ref 1.6–2.4)
MAGNESIUM SERPL-MCNC: 6.9 MG/DL (ref 1.6–2.4)
MCH RBC QN AUTO: 27.8 PG (ref 26–34)
MCH RBC QN AUTO: 28.2 PG (ref 26–34)
MCHC RBC AUTO-ENTMCNC: 33 G/DL (ref 30–36.5)
MCHC RBC AUTO-ENTMCNC: 33.2 G/DL (ref 30–36.5)
MCV RBC AUTO: 84.4 FL (ref 80–99)
MCV RBC AUTO: 84.9 FL (ref 80–99)
NRBC # BLD: 0 K/UL (ref 0–0.01)
NRBC # BLD: 0 K/UL (ref 0–0.01)
NRBC BLD-RTO: 0 PER 100 WBC
NRBC BLD-RTO: 0 PER 100 WBC
PLATELET # BLD AUTO: 332 K/UL (ref 150–400)
PLATELET # BLD AUTO: 345 K/UL (ref 150–400)
PMV BLD AUTO: 12.1 FL (ref 8.9–12.9)
PMV BLD AUTO: 12.5 FL (ref 8.9–12.9)
POTASSIUM SERPL-SCNC: 4.3 MMOL/L (ref 3.5–5.1)
POTASSIUM SERPL-SCNC: 4.4 MMOL/L (ref 3.5–5.1)
PROT SERPL-MCNC: 5.2 G/DL (ref 6.4–8.2)
PROT SERPL-MCNC: 6.1 G/DL (ref 6.4–8.2)
RBC # BLD AUTO: 3.97 M/UL (ref 3.8–5.2)
RBC # BLD AUTO: 4.49 M/UL (ref 3.8–5.2)
SERVICE CMNT-IMP: ABNORMAL
SERVICE CMNT-IMP: NORMAL
SERVICE CMNT-IMP: NORMAL
SODIUM SERPL-SCNC: 134 MMOL/L (ref 136–145)
SODIUM SERPL-SCNC: 136 MMOL/L (ref 136–145)
THERAPEUTIC MAGNESIUM: 6.8 MG/DL (ref 4.8–8.4)
WBC # BLD AUTO: 12.7 K/UL (ref 3.6–11)
WBC # BLD AUTO: 19.5 K/UL (ref 3.6–11)

## 2024-10-21 PROCEDURE — 76816 OB US FOLLOW-UP PER FETUS: CPT | Performed by: STUDENT IN AN ORGANIZED HEALTH CARE EDUCATION/TRAINING PROGRAM

## 2024-10-21 PROCEDURE — 83735 ASSAY OF MAGNESIUM: CPT

## 2024-10-21 PROCEDURE — 76820 UMBILICAL ARTERY ECHO: CPT | Performed by: STUDENT IN AN ORGANIZED HEALTH CARE EDUCATION/TRAINING PROGRAM

## 2024-10-21 PROCEDURE — 85027 COMPLETE CBC AUTOMATED: CPT

## 2024-10-21 PROCEDURE — 71045 X-RAY EXAM CHEST 1 VIEW: CPT

## 2024-10-21 PROCEDURE — 7100000000 HC PACU RECOVERY - FIRST 15 MIN: Performed by: OBSTETRICS & GYNECOLOGY

## 2024-10-21 PROCEDURE — 83615 LACTATE (LD) (LDH) ENZYME: CPT

## 2024-10-21 PROCEDURE — 85384 FIBRINOGEN ACTIVITY: CPT

## 2024-10-21 PROCEDURE — 3700000000 HC ANESTHESIA ATTENDED CARE: Performed by: OBSTETRICS & GYNECOLOGY

## 2024-10-21 PROCEDURE — 70551 MRI BRAIN STEM W/O DYE: CPT

## 2024-10-21 PROCEDURE — 2709999900 HC NON-CHARGEABLE SUPPLY: Performed by: OBSTETRICS & GYNECOLOGY

## 2024-10-21 PROCEDURE — 59514 CESAREAN DELIVERY ONLY: CPT | Performed by: OBSTETRICS & GYNECOLOGY

## 2024-10-21 PROCEDURE — 7100000001 HC PACU RECOVERY - ADDTL 15 MIN: Performed by: OBSTETRICS & GYNECOLOGY

## 2024-10-21 PROCEDURE — 4A1HXCZ MONITORING OF PRODUCTS OF CONCEPTION, CARDIAC RATE, EXTERNAL APPROACH: ICD-10-PCS | Performed by: OBSTETRICS & GYNECOLOGY

## 2024-10-21 PROCEDURE — 2580000003 HC RX 258: Performed by: STUDENT IN AN ORGANIZED HEALTH CARE EDUCATION/TRAINING PROGRAM

## 2024-10-21 PROCEDURE — 94761 N-INVAS EAR/PLS OXIMETRY MLT: CPT

## 2024-10-21 PROCEDURE — 6370000000 HC RX 637 (ALT 250 FOR IP)

## 2024-10-21 PROCEDURE — 3700000001 HC ADD 15 MINUTES (ANESTHESIA): Performed by: OBSTETRICS & GYNECOLOGY

## 2024-10-21 PROCEDURE — 3609079900 HC CESAREAN SECTION: Performed by: OBSTETRICS & GYNECOLOGY

## 2024-10-21 PROCEDURE — 82962 GLUCOSE BLOOD TEST: CPT

## 2024-10-21 PROCEDURE — 6360000002 HC RX W HCPCS: Performed by: STUDENT IN AN ORGANIZED HEALTH CARE EDUCATION/TRAINING PROGRAM

## 2024-10-21 PROCEDURE — 2580000003 HC RX 258

## 2024-10-21 PROCEDURE — 80053 COMPREHEN METABOLIC PANEL: CPT

## 2024-10-21 PROCEDURE — 6360000002 HC RX W HCPCS

## 2024-10-21 PROCEDURE — 70450 CT HEAD/BRAIN W/O DYE: CPT

## 2024-10-21 PROCEDURE — 88307 TISSUE EXAM BY PATHOLOGIST: CPT

## 2024-10-21 PROCEDURE — 2700000000 HC OXYGEN THERAPY PER DAY

## 2024-10-21 PROCEDURE — 2580000003 HC RX 258: Performed by: NURSE ANESTHETIST, CERTIFIED REGISTERED

## 2024-10-21 PROCEDURE — 6370000000 HC RX 637 (ALT 250 FOR IP): Performed by: STUDENT IN AN ORGANIZED HEALTH CARE EDUCATION/TRAINING PROGRAM

## 2024-10-21 PROCEDURE — 2500000003 HC RX 250 WO HCPCS: Performed by: NURSE ANESTHETIST, CERTIFIED REGISTERED

## 2024-10-21 PROCEDURE — 6360000002 HC RX W HCPCS: Performed by: NURSE ANESTHETIST, CERTIFIED REGISTERED

## 2024-10-21 PROCEDURE — 2720000010 HC SURG SUPPLY STERILE: Performed by: OBSTETRICS & GYNECOLOGY

## 2024-10-21 PROCEDURE — 1100000000 HC RM PRIVATE

## 2024-10-21 PROCEDURE — 36415 COLL VENOUS BLD VENIPUNCTURE: CPT

## 2024-10-21 PROCEDURE — 6370000000 HC RX 637 (ALT 250 FOR IP): Performed by: OBSTETRICS & GYNECOLOGY

## 2024-10-21 RX ORDER — FUROSEMIDE 20 MG/1
20 TABLET ORAL DAILY
Status: DISCONTINUED | OUTPATIENT
Start: 2024-10-21 | End: 2024-10-22

## 2024-10-21 RX ORDER — OXYTOCIN 10 [USP'U]/ML
INJECTION, SOLUTION INTRAMUSCULAR; INTRAVENOUS
Status: DISCONTINUED | OUTPATIENT
Start: 2024-10-21 | End: 2024-10-21 | Stop reason: SDUPTHER

## 2024-10-21 RX ORDER — NIFEDIPINE 30 MG/1
30 TABLET, EXTENDED RELEASE ORAL 2 TIMES DAILY
Status: DISCONTINUED | OUTPATIENT
Start: 2024-10-21 | End: 2024-10-23

## 2024-10-21 RX ORDER — DOCUSATE SODIUM 100 MG/1
100 CAPSULE, LIQUID FILLED ORAL 2 TIMES DAILY
Status: DISCONTINUED | OUTPATIENT
Start: 2024-10-21 | End: 2024-10-25 | Stop reason: HOSPADM

## 2024-10-21 RX ORDER — OXYCODONE HYDROCHLORIDE 5 MG/1
10 TABLET ORAL EVERY 4 HOURS PRN
Status: DISCONTINUED | OUTPATIENT
Start: 2024-10-21 | End: 2024-10-25 | Stop reason: HOSPADM

## 2024-10-21 RX ORDER — MORPHINE SULFATE 1 MG/ML
INJECTION, SOLUTION EPIDURAL; INTRATHECAL; INTRAVENOUS
Status: DISCONTINUED | OUTPATIENT
Start: 2024-10-21 | End: 2024-10-21 | Stop reason: SDUPTHER

## 2024-10-21 RX ORDER — ONDANSETRON 2 MG/ML
INJECTION INTRAMUSCULAR; INTRAVENOUS
Status: DISCONTINUED | OUTPATIENT
Start: 2024-10-21 | End: 2024-10-21 | Stop reason: SDUPTHER

## 2024-10-21 RX ORDER — SODIUM CHLORIDE 0.9 % (FLUSH) 0.9 %
10 SYRINGE (ML) INJECTION PRN
Status: DISCONTINUED | OUTPATIENT
Start: 2024-10-21 | End: 2024-10-23

## 2024-10-21 RX ORDER — KETOROLAC TROMETHAMINE 30 MG/ML
30 INJECTION, SOLUTION INTRAMUSCULAR; INTRAVENOUS EVERY 6 HOURS
Status: DISCONTINUED | OUTPATIENT
Start: 2024-10-21 | End: 2024-10-22

## 2024-10-21 RX ORDER — SODIUM CHLORIDE, SODIUM LACTATE, POTASSIUM CHLORIDE, AND CALCIUM CHLORIDE .6; .31; .03; .02 G/100ML; G/100ML; G/100ML; G/100ML
1000 INJECTION, SOLUTION INTRAVENOUS ONCE
Status: DISCONTINUED | OUTPATIENT
Start: 2024-10-21 | End: 2024-10-23

## 2024-10-21 RX ORDER — METOCLOPRAMIDE 10 MG/1
10 TABLET ORAL ONCE
Status: DISCONTINUED | OUTPATIENT
Start: 2024-10-21 | End: 2024-10-25 | Stop reason: HOSPADM

## 2024-10-21 RX ORDER — ACETAMINOPHEN 500 MG
1000 TABLET ORAL EVERY 8 HOURS SCHEDULED
Status: DISCONTINUED | OUTPATIENT
Start: 2024-10-21 | End: 2024-10-25 | Stop reason: HOSPADM

## 2024-10-21 RX ORDER — DEXTROSE MONOHYDRATE 100 MG/ML
INJECTION, SOLUTION INTRAVENOUS CONTINUOUS PRN
Status: DISCONTINUED | OUTPATIENT
Start: 2024-10-21 | End: 2024-10-25 | Stop reason: HOSPADM

## 2024-10-21 RX ORDER — BUPIVACAINE HYDROCHLORIDE 7.5 MG/ML
INJECTION, SOLUTION EPIDURAL; RETROBULBAR
Status: DISCONTINUED | OUTPATIENT
Start: 2024-10-21 | End: 2024-10-21 | Stop reason: SDUPTHER

## 2024-10-21 RX ORDER — DIPHENHYDRAMINE HCL 25 MG
25 CAPSULE ORAL
Status: COMPLETED | OUTPATIENT
Start: 2024-10-21 | End: 2024-10-21

## 2024-10-21 RX ORDER — LANOLIN/MINERAL OIL
LOTION (ML) TOPICAL
Status: DISCONTINUED | OUTPATIENT
Start: 2024-10-21 | End: 2024-10-25 | Stop reason: HOSPADM

## 2024-10-21 RX ORDER — BETAMETHASONE SODIUM PHOSPHATE AND BETAMETHASONE ACETATE 3; 3 MG/ML; MG/ML
12 INJECTION, SUSPENSION INTRA-ARTICULAR; INTRALESIONAL; INTRAMUSCULAR; SOFT TISSUE DAILY
Status: COMPLETED | OUTPATIENT
Start: 2024-10-21 | End: 2024-10-21

## 2024-10-21 RX ORDER — INSULIN LISPRO 100 [IU]/ML
0-16 INJECTION, SOLUTION INTRAVENOUS; SUBCUTANEOUS
Status: DISCONTINUED | OUTPATIENT
Start: 2024-10-21 | End: 2024-10-22

## 2024-10-21 RX ORDER — FAMOTIDINE 10 MG/ML
INJECTION, SOLUTION INTRAVENOUS
Status: DISCONTINUED | OUTPATIENT
Start: 2024-10-21 | End: 2024-10-21 | Stop reason: SDUPTHER

## 2024-10-21 RX ORDER — SWAB
1 SWAB, NON-MEDICATED MISCELLANEOUS DAILY
Status: DISCONTINUED | OUTPATIENT
Start: 2024-10-21 | End: 2024-10-25 | Stop reason: HOSPADM

## 2024-10-21 RX ORDER — SODIUM CHLORIDE, SODIUM LACTATE, POTASSIUM CHLORIDE, CALCIUM CHLORIDE 600; 310; 30; 20 MG/100ML; MG/100ML; MG/100ML; MG/100ML
INJECTION, SOLUTION INTRAVENOUS CONTINUOUS
Status: CANCELLED | OUTPATIENT
Start: 2024-10-21

## 2024-10-21 RX ORDER — FUROSEMIDE 10 MG/ML
20 INJECTION INTRAMUSCULAR; INTRAVENOUS ONCE
Status: COMPLETED | OUTPATIENT
Start: 2024-10-21 | End: 2024-10-21

## 2024-10-21 RX ORDER — SODIUM CHLORIDE 0.9 % (FLUSH) 0.9 %
5-40 SYRINGE (ML) INJECTION EVERY 12 HOURS SCHEDULED
Status: DISCONTINUED | OUTPATIENT
Start: 2024-10-21 | End: 2024-10-25 | Stop reason: HOSPADM

## 2024-10-21 RX ORDER — EPHEDRINE SULFATE/0.9% NACL/PF 50 MG/5 ML
SYRINGE (ML) INTRAVENOUS
Status: DISCONTINUED | OUTPATIENT
Start: 2024-10-21 | End: 2024-10-21 | Stop reason: SDUPTHER

## 2024-10-21 RX ORDER — ONDANSETRON 2 MG/ML
4 INJECTION INTRAMUSCULAR; INTRAVENOUS EVERY 6 HOURS PRN
Status: DISCONTINUED | OUTPATIENT
Start: 2024-10-21 | End: 2024-10-25 | Stop reason: HOSPADM

## 2024-10-21 RX ORDER — SODIUM CHLORIDE 0.9 % (FLUSH) 0.9 %
5-40 SYRINGE (ML) INJECTION PRN
Status: DISCONTINUED | OUTPATIENT
Start: 2024-10-21 | End: 2024-10-25 | Stop reason: HOSPADM

## 2024-10-21 RX ORDER — ONDANSETRON 4 MG/1
4 TABLET, ORALLY DISINTEGRATING ORAL EVERY 8 HOURS PRN
Status: DISCONTINUED | OUTPATIENT
Start: 2024-10-21 | End: 2024-10-25 | Stop reason: HOSPADM

## 2024-10-21 RX ORDER — OXYCODONE HYDROCHLORIDE 5 MG/1
5 TABLET ORAL EVERY 4 HOURS PRN
Status: DISCONTINUED | OUTPATIENT
Start: 2024-10-21 | End: 2024-10-25 | Stop reason: HOSPADM

## 2024-10-21 RX ORDER — SODIUM CHLORIDE 9 MG/ML
INJECTION, SOLUTION INTRAVENOUS PRN
Status: DISCONTINUED | OUTPATIENT
Start: 2024-10-21 | End: 2024-10-25 | Stop reason: HOSPADM

## 2024-10-21 RX ORDER — INSULIN LISPRO 100 [IU]/ML
2 INJECTION, SOLUTION INTRAVENOUS; SUBCUTANEOUS ONCE
Status: COMPLETED | OUTPATIENT
Start: 2024-10-21 | End: 2024-10-21

## 2024-10-21 RX ORDER — IBUPROFEN 800 MG/1
800 TABLET, FILM COATED ORAL EVERY 8 HOURS
Status: DISCONTINUED | OUTPATIENT
Start: 2024-10-22 | End: 2024-10-22

## 2024-10-21 RX ORDER — SODIUM CHLORIDE, SODIUM LACTATE, POTASSIUM CHLORIDE, CALCIUM CHLORIDE 600; 310; 30; 20 MG/100ML; MG/100ML; MG/100ML; MG/100ML
INJECTION, SOLUTION INTRAVENOUS
Status: DISCONTINUED | OUTPATIENT
Start: 2024-10-21 | End: 2024-10-21 | Stop reason: SDUPTHER

## 2024-10-21 RX ORDER — INSULIN LISPRO 100 [IU]/ML
6 INJECTION, SOLUTION INTRAVENOUS; SUBCUTANEOUS ONCE
Status: DISCONTINUED | OUTPATIENT
Start: 2024-10-21 | End: 2024-10-21

## 2024-10-21 RX ORDER — FENTANYL CITRATE 50 UG/ML
INJECTION, SOLUTION INTRAMUSCULAR; INTRAVENOUS
Status: DISCONTINUED | OUTPATIENT
Start: 2024-10-21 | End: 2024-10-21 | Stop reason: SDUPTHER

## 2024-10-21 RX ADMIN — NIFEDIPINE 10 MG: 10 CAPSULE ORAL at 06:07

## 2024-10-21 RX ADMIN — ONDANSETRON 4 MG: 2 INJECTION INTRAMUSCULAR; INTRAVENOUS at 16:12

## 2024-10-21 RX ADMIN — Medication 10 MG: at 16:34

## 2024-10-21 RX ADMIN — SODIUM CHLORIDE, PRESERVATIVE FREE 10 ML: 5 INJECTION INTRAVENOUS at 07:44

## 2024-10-21 RX ADMIN — FENTANYL CITRATE 10 MCG: 50 INJECTION, SOLUTION INTRAMUSCULAR; INTRAVENOUS at 16:15

## 2024-10-21 RX ADMIN — ONDANSETRON 4 MG: 2 INJECTION INTRAMUSCULAR; INTRAVENOUS at 20:36

## 2024-10-21 RX ADMIN — FAMOTIDINE 20 MG: 10 INJECTION, SOLUTION INTRAVENOUS at 16:12

## 2024-10-21 RX ADMIN — BETAMETHASONE SODIUM PHOSPHATE AND BETAMETHASONE ACETATE 12 MG: 3; 3 INJECTION, SUSPENSION INTRA-ARTICULAR; INTRALESIONAL; INTRAMUSCULAR at 13:24

## 2024-10-21 RX ADMIN — INSULIN LISPRO 2 UNITS: 100 INJECTION, SOLUTION INTRAVENOUS; SUBCUTANEOUS at 14:35

## 2024-10-21 RX ADMIN — MAGNESIUM SULFATE HEPTAHYDRATE 2000 MG/HR: 40 INJECTION, SOLUTION INTRAVENOUS at 14:21

## 2024-10-21 RX ADMIN — Medication 10 MG: at 17:02

## 2024-10-21 RX ADMIN — DIPHENHYDRAMINE HYDROCHLORIDE 25 MG: 25 CAPSULE ORAL at 09:01

## 2024-10-21 RX ADMIN — MAGNESIUM SULFATE HEPTAHYDRATE 2000 MG/HR: 40 INJECTION, SOLUTION INTRAVENOUS at 03:42

## 2024-10-21 RX ADMIN — NIFEDIPINE 10 MG: 10 CAPSULE ORAL at 14:20

## 2024-10-21 RX ADMIN — ACETAMINOPHEN 1000 MG: 500 TABLET ORAL at 23:38

## 2024-10-21 RX ADMIN — OXYTOCIN 30 UNITS: 10 INJECTION, SOLUTION INTRAMUSCULAR; INTRAVENOUS at 16:38

## 2024-10-21 RX ADMIN — BUPIVACAINE HYDROCHLORIDE 1.5 ML: 7.5 INJECTION, SOLUTION EPIDURAL; RETROBULBAR at 16:15

## 2024-10-21 RX ADMIN — KETOROLAC TROMETHAMINE 30 MG: 30 INJECTION, SOLUTION INTRAMUSCULAR at 19:05

## 2024-10-21 RX ADMIN — FUROSEMIDE 20 MG: 10 INJECTION, SOLUTION INTRAMUSCULAR; INTRAVENOUS at 11:44

## 2024-10-21 RX ADMIN — SODIUM CHLORIDE, POTASSIUM CHLORIDE, SODIUM LACTATE AND CALCIUM CHLORIDE: 600; 310; 30; 20 INJECTION, SOLUTION INTRAVENOUS at 17:14

## 2024-10-21 RX ADMIN — ACETAMINOPHEN 1000 MG: 500 TABLET ORAL at 07:47

## 2024-10-21 RX ADMIN — BUTALBITAL, ACETAMINOPHEN, AND CAFFEINE 1 TABLET: 325; 50; 40 TABLET ORAL at 06:07

## 2024-10-21 RX ADMIN — INSULIN LISPRO 4 UNITS: 100 INJECTION, SOLUTION INTRAVENOUS; SUBCUTANEOUS at 07:42

## 2024-10-21 RX ADMIN — MORPHINE SULFATE 0.2 MG: 1 INJECTION, SOLUTION EPIDURAL; INTRATHECAL; INTRAVENOUS at 16:15

## 2024-10-21 RX ADMIN — SODIUM CHLORIDE, POTASSIUM CHLORIDE, SODIUM LACTATE AND CALCIUM CHLORIDE: 600; 310; 30; 20 INJECTION, SOLUTION INTRAVENOUS at 06:24

## 2024-10-21 RX ADMIN — SODIUM CHLORIDE, POTASSIUM CHLORIDE, SODIUM LACTATE AND CALCIUM CHLORIDE: 600; 310; 30; 20 INJECTION, SOLUTION INTRAVENOUS at 16:06

## 2024-10-21 RX ADMIN — INSULIN LISPRO 4 UNITS: 100 INJECTION, SOLUTION INTRAVENOUS; SUBCUTANEOUS at 13:46

## 2024-10-21 RX ADMIN — CEFAZOLIN SODIUM 2000 MG: 1 POWDER, FOR SOLUTION INTRAMUSCULAR; INTRAVENOUS at 16:21

## 2024-10-21 RX ADMIN — NIFEDIPINE 30 MG: 30 TABLET, FILM COATED, EXTENDED RELEASE ORAL at 22:04

## 2024-10-21 ASSESSMENT — PAIN SCALES - GENERAL
PAINLEVEL_OUTOF10: 7
PAINLEVEL_OUTOF10: 5
PAINLEVEL_OUTOF10: 7

## 2024-10-21 ASSESSMENT — PAIN DESCRIPTION - DESCRIPTORS: DESCRIPTORS: THROBBING

## 2024-10-21 ASSESSMENT — PAIN DESCRIPTION - ORIENTATION: ORIENTATION: ANTERIOR

## 2024-10-21 ASSESSMENT — PAIN DESCRIPTION - LOCATION
LOCATION: ABDOMEN
LOCATION: ABDOMEN
LOCATION: HEAD;EYE
LOCATION: HEAD;EYE

## 2024-10-21 NOTE — PROGRESS NOTES
Brief Progress Note   Mag check:  Patient seen and evaluated, patient examined. At this time she denies any HA, chest pain, dyspnea, abdominal pain. Endorsing LE numbness 2/2 epidural and no pain.     Patient Vitals for the past 4 hrs:   Temp Pulse Resp BP SpO2   10/21/24 1726 -- 79 -- -- 98 %   10/21/24 1722 -- 76 -- 114/60 --   10/21/24 1715 97.6 °F (36.4 °C) 76 12 112/62 (!) 76 %   10/21/24 1710 -- 74 -- 112/62 98 %   10/21/24 1505 98.1 °F (36.7 °C) (!) 111 18 124/68 98 %   10/21/24 1405 98 °F (36.7 °C) 94 18 (!) 141/89 99 %          Physical Exam:  Gen: awake alert no acute distress  Cardio: RRR, no MRG, pulses 2+  Chest: lungs CTAB no wheezes or crackles  Abd: soft nontender, fundus firm  Ext: unchanged trace BLE edema, 2+ DTR    Assessment/Plan   Rochelle Giraldo is a 28 y.o. female  at 32w1d who presented to L&D for severe pre-eclampsia, now PPD0 sp LTCS     Severe pre-eclampsia: POA 's systolic, improved  s/p dose 20 mg PO nifedipine and 20 mg IV labetalol. Loaded with Mg and now on 2g q1h. UPCR noted 14.3. CXR showing mild interstitial edema - indicating immediate induction. Mg levels have remained therapeutic and without clinical signs of overdose. CT head concerning for PRES.   - s/p LTCS  - MRI given concern on CT for PRES  - TTE given pulm edema  - Repeat labs overnight   - Mg 2g q1h IV for 24h post-delivery  - Plan for Mg level checks q4h postop then may space to q8h   - Continue Mg PE checks q4h for symptom changes  - Nifedipine 30 mg XR BID     IUGR,  delivery: EFW today EFW 1437g (1.9%ile), delivered  life female infant who is in the care of NICU. Apgars were 8/9  - CS details as above  - NICU caring for baby     A1GDM:  A1c 5.7. Diet controlled. Hyperglycemic iso steroids   - Fasting and 2h PP glucose checks since given steroids  - NPH 10 U BID with SSI     Pt discussed with Dr. Johnson (FM-OB attending)   Fernanda Joshi MD  Family Practice Resident

## 2024-10-21 NOTE — PROGRESS NOTES
Progress Note  Patient seen, fetal heart rate and contraction pattern evaluated, patient examined.  Endorsing relapse of her headache     Patient Vitals for the past 4 hrs:   Temp Pulse Resp BP SpO2   10/21/24 1205 97.8 °F (36.6 °C) 85 17 (!) 146/89 100 %   10/21/24 1105 97.7 °F (36.5 °C) 84 18 134/83 99 %   10/21/24 1005 97.5 °F (36.4 °C) 99 17 (!) 135/90 100 %          Physical Exam:  Awake alert, anxious but comfortable appearing  RRR no mrg  Lungs CTAB no crackles     Cervical Exam:   deferred  Membranes:  Intact  Uterine Activity: None  Fetal Heart Rate: Baseline: 125 per minute  Variability: moderate but has had periods up to 30min of minimal  Accelerations: no  Decelerations: none currently but has had several variable decels during admission      Assessment/Plan   Rochelle Giraldo is a 28 y.o. female  at 32w1d who presented to L&D for severe pre-eclampsia     Severe pre-eclampsia: POA 's systolic, improved to 130-141 systolic s/p dose 20 mg PO nifedipine and 20 mg IV labetalol. Loaded with Mg and now on 2g q1h. UPCR noted 14.3. Headache initially improved s/p benadryl but now worsened.  CXR showing mild interstitial edema - indicating immediate induction  - M c/s, appreciate recs - indications to deliver immediately working on transfer to [facility], per [facility] likely will not tolerate IOL due to UGR and presence of decels already  - NPO   - For HA persisting - CT head non-contrast to r/o PRES  - Repeat labs drawn and pending  - CXR showing mild interstitial edema  - Plan for Mg level checks q8h in setting of patient with improved symptoms, supply shortage   - Continue Mg PE checks q4h for symptom changes  - Mg 2g q1h IV   - Nifedipine 30 mg XR BID for evening dose    IUGR,  delivery: EFW today EFW 1437g (1.9%ile)  - CS as above  - NICU aware     A1GDM:  A1c 5.7. Diet controlled. Hyperglycemic iso steroids   - Fasting and 2h PP glucose checks  - NPH 10 U BID with SSI  - Rechecking BG

## 2024-10-21 NOTE — PROGRESS NOTES
[address] [place] [address]   Office [phone], Fax [phone]    Antepartum Magnesium Note    Subjective: Pt endorsing 7/10 HA now s/p fioricet at 0600.     Denies HA, blurry vision, dizziness, CP, SOB, abdominal pain, changes in BM or urination.    Objective: Patient Vitals for the past 4 hrs:   Temp Pulse Resp BP SpO2   10/21/24 0705 97.6 °F (36.4 °C) 86 17 (!) 140/89 100 %   10/21/24 0607 -- -- -- (!) 149/90 --   10/21/24 0606 -- -- -- -- 100 %   10/21/24 0605 -- 78 -- (!) 149/90 --   10/21/24 0505 98.1 °F (36.7 °C) 74 18 (!) 141/90 99 %   10/21/24 0409 -- 75 -- (!) 134/94 --           Intake/Output Summary (Last 24 hours) at 10/21/2024 0808  Last data filed at 10/21/2024 0705  Gross per 24 hour   Intake 1827.45 ml   Output 1695 ml   Net 132.45 ml       Gen: AAO x 3  CV: RRR no m/r/g  Resp: CTAB  Abdomen: gravid but soft, no tenderness in RUQ or fundus  Ext: 3+ RUE, 3+ LLE, o/w 2+ DTR, trace + LE edema  SVE: Deferred  FHTs:  bmp, + accels, + decels x 5 notable OVN since 1800   [place]: no contractions      A/P:  Rochelle Giraldo is a 28 y.o. female  at 32w1d who presented to L&D for severe pre-eclampsia     Severe pre-eclampsia: POA 's systolic, improved s/p dose 20 mg PO nifedipine and 20 mg IV labetalol. Loaded with Mg and now on 2g q1h. UPCR noted 14.3. Headache ranging 3-7/10 this AM.  - MFM c/s, appreciate recs  - For HA persisting:   - s/p fioricet @ 0600, minimal relief,   - s/p Tylenol @ 0747  - s/p Benadryl 25 mg po @ 0900  - s/p Reglan 10 mg po @ 0930  - Continue Benadryl, Reglan prn for HA   - CXR ordered given hx SOB, not currently endorsing, PE CTABL  - Plan for Mg level checks q8h in setting of patient with improved symptoms, supply shortage   - Continue Mg PE checks q4h for symptom changes  - Mg 2g q1h IV   - Nifedipine 10 mg PO AM and afternoon dose then switch to Nifed 30 mg XR BID for evening dose     A1GDM:  A1c 5.7. Diet controlled.

## 2024-10-21 NOTE — PLAN OF CARE
Problem: Chronic Conditions and Co-morbidities  Goal: Patient's chronic conditions and co-morbidity symptoms are monitored and maintained or improved  10/21/2024 0827 by Emmanuelle Ames RN  Outcome: Progressing  10/20/2024 2002 by Cristina Garcia RN  Outcome: Progressing     Problem: Pain  Goal: Verbalizes/displays adequate comfort level or baseline comfort level  10/21/2024 0827 by Emmanuelle Ames RN  Outcome: Progressing  10/20/2024 2002 by Cristina Garcia RN  Outcome: Progressing     Problem: Risk for Maternal Injury  Goal: Remains free from seizures and injury related to seizure activity  Description: INTERVENTIONS:.  -Maintain airway, patient safety and administer oxygen as ordered  -Monitor patient for seizure activity, document and report duration and descrition  -If Seizure occurs, turn patient to dide and suction secretions as needed  -Reorient patient post seizure  -Seizure Pads  -Instruct patient/family to notify RN of any seizure activity  -Instruct patient/family to call for assistance with activity based on assessment  10/21/2024 0827 by Emmanuelle Ames RN  Outcome: Progressing  Flowsheets (Taken 10/21/2024 0205 by Cristina Garcia, RN)  Remains free from seizures and injury related to seizure activity:   Instruct patient/family to notify RN of any seizure activity   Maintain airway, patient safety  and administer oxygen as ordered   Monitor patient for seizure activity, document and report duration and description of seizure to Licensed Independent Practitioner  10/20/2024 2002 by Cristina Garcia RN  Outcome: Progressing

## 2024-10-21 NOTE — PROGRESS NOTES
Magnesium Check -  Progress Note   Patient seen, fetal heart rate and contraction pattern evaluated, patient examined.    2200: Discussed with CHAPIN Evans who noted patient had a significant headache as well as slightly blurry vision for the last hour.     0000: Seen and examined patient, she states she is feeling better. CHAPIN Evans also at bedside for Mag check, patient reports headache improved from 7 to 3 after Fiorcet.       Patient Vitals for the past 1 hrs:   BP Pulse   10/20/24 2309 130/86 78       Physical Exam:     General: Trace BLE edema, 2+ reflexes. No acute distress. Abdomen gravid, soft, nontender.     Fetal Heart Rate: Reactive  Baseline: 130 per minute  Variability: moderate  Accelerations: yes  Decelerations: none    Assessment/Plan     Rochelle Giraldo is a 28 y.o. female  at 32w0d who presented to L&D for severe pre-eclampsia     Severe pre-eclampsia: POA 's systolic, improved s/p dose 20 mg PO nifedipine and 20 mg IV labetalol. Loaded with Mg and now on 2g q1h. UPCR noted 14.3. Continued headache with improvement after Fiorcet/Benadryl.   - Fiorcet q4h prn for continued headache   - Plan for Mg level checks q8h in setting of patient with improved symptoms, supply shortage   - Continue Mg checks q4h for symptom changes  - Mg 2g q1h IV   - Nifedipine 10 mg PO TID      A1GDM:  A1c 5.7. Diet controlled  - Fasting and 2h PP glucose checks     PNL: O+, Ab screen neg, HIV/HCV/RPR NR, C/Gcneg,  Hep B NI,  VZV/Rubella immune, hgb 11.1, Hbg fract WNL  Ultrasound at 30w1d with EFW 9%.     Pt will be discussed with Dr. Angel (OB Hospitalist)   Vern Lao DO  Family Practice Resident

## 2024-10-21 NOTE — PLAN OF CARE
Problem: Chronic Conditions and Co-morbidities  Goal: Patient's chronic conditions and co-morbidity symptoms are monitored and maintained or improved  10/20/2024 2002 by Cristina Garcia RN  Outcome: Progressing  10/20/2024 1749 by Meena Ghosh RN  Outcome: Progressing     Problem: Pain  Goal: Verbalizes/displays adequate comfort level or baseline comfort level  10/20/2024 2002 by Cristina Garcia RN  Outcome: Progressing  10/20/2024 1749 by Meena Ghosh RN  Outcome: Progressing     Problem: Risk for Maternal Injury  Goal: Remains free from seizures and injury related to seizure activity  Description: INTERVENTIONS:.  -Maintain airway, patient safety and administer oxygen as ordered  -Monitor patient for seizure activity, document and report duration and descrition  -If Seizure occurs, turn patient to dide and suction secretions as needed  -Reorient patient post seizure  -Seizure Pads  -Instruct patient/family to notify RN of any seizure activity  -Instruct patient/family to call for assistance with activity based on assessment  Outcome: Progressing     Problem: Risk for Decreased Cardiac Output  Goal: Maintains optimal cardiac output and hemodynamic stability  Description: INTERVENTIONS:.  -Monitor blood pressure and heart rate  -Monitor urine output and notify licensed practitioner for values outside of   -Assess for signes of decreased cardiac output  -Administer fluid and /or volume expanders as ordered    Outcome: Progressing  Goal: Achieves optimal ventilation and oxygenation  Description: INTERVENTIONS:.  -Assess for changes in respiratory status   -Assess for changes in mentation and behavior  -Position to facilitate oxygenation and minimize respiratory effort  -Oxygen supplementation based on oxygen saturation or arterial blood gases  -Initiate smoking cessation protocol as indicated  -Encourage broncho-pulmonary hygiene including cough, deep breathe, incentive spirometry  -Assess the need  Delmar Casas is a 74 y.o. male on day 4 of admission presenting with Respiratory failure with hypoxia, unspecified chronicity (Multi).      Subjective   -Patient was seen in the ICU today-with precautions due to being on isolation  -Improving serum sodium and serum creatinine.  Good urine output     Objective          Vitals 24HR  Heart Rate:  [53-82]   Temp:  [36.2 °C (97.2 °F)-36.9 °C (98.4 °F)]   Resp:  [13-26]   BP: ()/(38-64)   Weight:  [67.8 kg (149 lb 7.6 oz)]   SpO2:  [97 %-100 %]         Intake/Output last 3 Shifts:    Intake/Output Summary (Last 24 hours) at 7/17/2024 1126  Last data filed at 7/17/2024 0717  Gross per 24 hour   Intake 897 ml   Output 200 ml   Net 697 ml       Physical Exam      Limited exam due to COVID-19 to limit exposure and transmission  General appearance: no distress.  Not awake.  Lying flat in bed.  NG tube in place  Eyes: non-icteric  Skin: no apparent rash  Neuro: No FND,asterixis, no focal deficits noticed  Pickett catheter in place with clear yellow urine     aspirin, 81 mg, oral, Daily  dexAMETHasone, 6 mg, intravenous, q24h  heparin (porcine), 5,000 Units, subcutaneous, q8h  insulin lispro, 0-5 Units, subcutaneous, q6h  mupirocin, , Topical, BID  nystatin, 4 mL, Swish & Swallow, TID  oxygen, , inhalation, Continuous - Inhalation  pantoprazole, 40 mg, intravenous, Daily  piperacillin-tazobactam, 2.25 g, intravenous, q6h  remdesivir, 100 mg, intravenous, q24h  rosuvastatin, 10 mg, oral, Daily  [Held by provider] senna, 10 mL, oral, BID  [Held by provider] tamsulosin, 0.4 mg, oral, Nightly  tiotropium, 2 puff, inhalation, Daily  vancomycin, 750 mg, intravenous, q24h      RFP  Recent Labs     07/17/24  0533 07/16/24  0510 07/15/24  1758 07/15/24  0538 07/14/24  1137 07/14/24  0514 07/14/24  0339    144 148* 146* 151* 153* 152*   K 3.5 3.6 3.6 3.5 3.8 4.1 4.0   * 112* 113* 114* 117* 119* 119*   CO2 23 24 24 23 23 23 23   BUN 44* 52* 57* 68* 78* 83* 87*   CREATININE  1.65* 1.71* 1.95* 2.19* 2.92* 3.14* 3.41*   GLUCOSE 157* 159* 135* 148* 111* 162* 157*   CALCIUM 7.7* 7.8* 7.9* 7.8* 7.9* 7.6* 7.6*   ALBUMIN 2.3* 2.3* 2.3* 2.4* 2.4* 2.3* 2.3*   PHOS 3.0 3.2 3.6 3.5 3.2 3.1 3.1   EGFR 43* 41* 35* 31* 22* 20* 18*   ANIONGAP 13 12 15 13 15 15 14        Urineanalysis  Recent Labs     07/13/24 2115   COLORU Light-Orange*   APPEARANCEU Ex.Turbid*   SPECGRAVU 1.018   JOLANTA 5.5   PROTUR 70 (1+)*   GLUCOSEU Normal   BLOODU 0.2 (2+)*   KETONESU NEGATIVE   BILIRUBINU NEGATIVE   NITRITEU NEGATIVE   LEUKOCYTESU 500 Sly/µL*       Urine Electrolytes  Recent Labs     07/13/24 2115 07/13/24 2105   SODIUMURR  --  39   NACREATUR  --  28   KUR  --  59   KCREATUR  --  43   CREATU  --  137.1   PROTUR 70 (1+)*  --         Urine Micro  Recent Labs     07/13/24 2115   WBCU >50*   RBCU >20*   HYALCASTU 3+*   SQUAMEPIU 1-9 (SPARSE)   BACTERIAU 1+*   MUCUSU 1+        Iron  Recent Labs     07/14/24  0514   FERRITIN 728*       @Mg@    Lab Results   Component Value Date    WBC 8.2 07/17/2024    HGB 9.9 (L) 07/17/2024    HCT 32.0 (L) 07/17/2024    MCV 93 07/17/2024     07/17/2024           Assessment/Plan     Delmar Casas is a 74 y.o. male with past medical history of malignant neoplasm of unspecified  part of right bronchus, s/p CT and immunotherapy(1 month ago), sepsis, recurrent UTI, CKD stage IV, oropharyngeal dysphagia, asthma (no home oxygen) , BPH, hematuria, hypokalemia, CAD, anemia, neutropenia, HLD, early dementia, mood disturbance, anxiety who presented to the ED for shortness of breath, gurgling in the throat and altered mental status.  Nephrology was consulted to manage acute kidney injury/severe hypernatremia      Principal Problem:    Respiratory failure with hypoxia, unspecified chronicity (Multi)  Active Problems:    Lung cancer (Multi)    COVID    Impression  # Acute kidney injury/CKD-with history of decreased p.o. intake in the nursing facility.  Possible prerenal.  Improving with  fluid resuscitation D5W.  Currently nonoliguric.  No clear baseline serum creatinine.  Family denies history of CKD.  Serum creatinine today is improving 2.1-->1.7--> 1.65    # Severe hypernatremia serum sodium 165 on presentation-most likely dehydration.-improving with resuscitation was IV D5W.  Currently normal    # Altered mental status/early dementia    # COVID-19 positive    # Acute hypoxic respiratory failure/leukocytosis-currently on azithromycin/vancomycin/Zosyn    # Chronic Pickett catheter     Recommendation plan  -Continue D5W 75 cc/h until patient is able to receive p.o. fluid intake.  Ensure at least 1.5 L of free water through NG tube  -No need for Pickett catheter from kidney standpoint.  Void trial per primary team.  Urology consultation as needed          Kitty Tan MD

## 2024-10-21 NOTE — PROGRESS NOTES
0705 This RN received report and assumed care of pt. Pt is resting in her bed; endorses fetal movement. Pt denies LOF, vaginal bleeding, epigastric pain, visual disturbances/changes, or other complaints at this time.     0740 Dr. Fournier to the bedside; discussed POC and answered pt questions/concerns.     0810 Dr. Johnson and Dr. Fournier to the bedside.     0822 Dr. Johnson aware of deceleration; provider reviewed EFM/TOCO tracing.     [id number] Dr. Merritt to the bedside for evaluation as requested via MFM consult ordered by Dr. Johnson.     1506 Pt off the unit to CT per provider orders; RN remained with pt throughout transport and procedure.     1530 Pt returned to unit from CT via stretcher. RN remained with pt.     1835 Pt transported to MRI by this RN; RN remained with pt throughout procedure.     1855 Pt returned to unit from MRI.     1900 Report given to MAXINE Granados RN at the bedside.

## 2024-10-21 NOTE — PROGRESS NOTES
Chart reviewed and I have met with patient.  Dr. Johnson asked me to consult with patient for delivery.  Delivery is recommended at 32 weeks due to severe preeclampsia with pulmonary edema.  Patient was seen by maternal-fetal medicine earlier today who recommended delivery.  Fetal monitor strip demonstrates intermittent variables and it is unlikely the baby will tolerate labor.  Patient has had a persistent headache.  She is currently on magnesium and Procardia xl to manage her blood pressure.    I discussed the  with the patient and her partner.  The risk benefits and alternative were discussed with the patient and questions were addressed.  We will proceed with  later this afternoon.

## 2024-10-21 NOTE — PROGRESS NOTES
[id number] [id number]   Office [phone], Fax [phone]    Antepartum Magnesium Note    Subjective: Pt endorsing 3/10 HA improved after receiving benadryl, notes some somnolence since benadryl.     Denies blurry vision, dizziness, CP, SOB, abdominal pain, changes in BM or urination.    Objective: Patient Vitals for the past 4 hrs:   Temp Pulse Resp BP SpO2   10/21/24 1105 97.7 °F (36.5 °C) 84 18 134/83 99 %   10/21/24 1005 97.5 °F (36.4 °C) 99 17 (!) 135/90 100 %   10/21/24 0905 97.7 °F (36.5 °C) 83 18 137/87 100 %   10/21/24 0805 97.8 °F (36.6 °C) 80 18 (!) 140/86 100 %           Intake/Output Summary (Last 24 hours) at 10/21/2024 1152  Last data filed at 10/21/2024 1105  Gross per 24 hour   Intake 2107.33 ml   Output 2245 ml   Net -137.67 ml       Gen: AAO x 3  CV: RRR no m/r/g  Resp: CTABL  Abdomen: gravid but soft, mild suprapubic pressure  Ext: 2+ DTR, trace + LE edema, trace LUE edema   SVE: Deferred  FHTs:  bmp, + accels, + decels x 7 notable OVN since 1800   [place]: no contractions      A/P:  Rochelle Giraldo is a 28 y.o. female  at 32w1d who presented to L&D for severe pre-eclampsia     Severe pre-eclampsia: POA 's systolic, improved to 130-141 systolic s/p dose 20 mg PO nifedipine and 20 mg IV labetalol. Loaded with Mg and now on 2g q1h. UPCR noted 14.3. Headache improved s/p benadryl.  - MFM c/s, appreciate recs  - For HA persisting:   - Continue Benadryl, Reglan prn for HA   - CXR showing mild interstitial edema   - 20 mg IV lasix  - Plan for Mg level checks q8h in setting of patient with improved symptoms, supply shortage   - Continue Mg PE checks q4h for symptom changes  - Mg 2g q1h IV   - Nifedipine 10 mg PO AM and afternoon dose then switch to Nifed 30 mg XR BID for evening dose     A1GDM:  A1c 5.7. Diet controlled. Hyperglycemic iso   - Fasting and 2h PP glucose checks  - NPH 10 U BID with SSI     PNL: O+, Ab screen neg, HIV/HCV/RPR NR,

## 2024-10-21 NOTE — CARE COORDINATION
10/21/2024  2:41 PM    Care Management Progress Note    Reason for Admission:   Pregnancy with 32 completed weeks gestation [Z3A.32]  Procedure(s) (LRB):   SECTION (N/A)  Day of Surgery    Patient Admission Status: Inpatient    Transition of care plan:    CM met with LUMA to complete initial assessment and begin discharge planning.  MOB verified and confirmed demographics.  LUMA lives with FOB, at the address on file. LUMA reports she has good family support, and feels like she has the support she needs when she returns home.  LUMA plans to breast feed baby.  LUMA is undecided on pediatrician but may either use [facility] or [facility]. CM discussed necessary supplies needed for infant upon discharge. LUMA has straight Medicaid and does not have a managed care program yet. CM advised on how to add add infant to program.        10/21/24 1440   Service Assessment   Patient Orientation Alert and Oriented   Cognition Alert   History Provided By Patient   Primary Caregiver Self   Support Systems Spouse/Significant Other   PCP Verified by CM Yes   Last Visit to PCP Within last 3 months   Prior Functional Level Independent in ADLs/IADLs   Current Functional Level Independent in ADLs/IADLs   Can patient return to prior living arrangement Yes   Ability to make needs known: Good   Family able to assist with home care needs: Yes   Would you like for me to discuss the discharge plan with any other family members/significant others, and if so, who? No   Financial Resources Medicaid     Shayne Taveras CM

## 2024-10-21 NOTE — PROCEDURES
PATIENT: TIA MEDINA   -  : 1995   -  DOS:10/21/2024   -  INTERPRETING PROVIDER:Karen Merritt,   Indication  ========    Gestational diabetes, FGR (10/7)    Method  ======    Transabdominal ultrasound examination. View: Suboptimal view    Pregnancy  =========    [place]ercado pregnancy. Number of fetuses: 1    Dating  ======    LMP on: 2024  GA by LMP 34 w + 2 d  LEILANI by LMP: 2024  Prior assessment by: From outside records  GA by prior assessment 32 w + 1 d  LEILANI by prior assessment: 12/15/2024  Ultrasound examination on: 10/21/2024  GA by U/S based upon: AC, BPD, Femur, HC  GA by U/S 30 w + 2 d  LEILANI by U/S: 2024  Assigned: based on stated LEILANI (From outside records), selected on 10/7/2024  Assigned GA 32 w + 1 d  Assigned LEILANI: 12/15/2024    Fetal Biometry  ============    Standard  BPD 78.3 mm 31w 3d 21% Hadlock  .3 mm 30w 5d 1% Hadlock  .5 mm 29w 1d <1% Hadlock  Femur 56.8 mm 29w 6d 2% Hadlock  EFW 1,437 g 29w 2d 2% Hadlock  EFW (lb) 3 lb  EFW (oz) 3 oz  EFW by: Hadlock (BPD-HC-AC-FL)  Other Structures   bpm    General Evaluation  ==============    Cardiac activity present.  bpm. Fetal movements: visualized. Presentation: Cephalic  Placenta: Placental site: posterior  Umbilical cord: Cord vessels: 3 vessel cord  Amniotic fluid: Amount of AF: subjectively low. MVP 2.4 cm    Fetal Anatomy  ===========    Heart / Thorax  Aortic arch view: SUBOPTIMAL  Bicaval view: SUBOPTIMAL  Ductal arch view: SUBOPTIMAL  Cord insertion: SUBOPTIMAL  Stomach: normal  Kidneys: normal  Bladder: normal  Wants to know fetal sex: yes    Fetal Doppler  ===========    Arterial  Umbilical artery: normal  Umbilical A EDF: positive  Umbilical A S / D 3.69  92% Mata    Findings  =======    Intrauterine Mercado pregnancy at 32w 1d by clinical dates.  EFW is 1437 g at 2%, abdominal circumference at <1%.  Anatomy visualized as stated above.  Amniotic fluid is subjectively  low.  Placenta is posterior.  Cephalic presentation.    Umbilical Artery Doppler studies were performed and positive end diastolic flow was demonstrated with normal resistance for the given gestational age.    We reviewed the findings and discussed the diagnostic limitations of the obstetric ultrasound.    Consultation  ==========    please see epic for recs.    Recommendations  ==============    Please see epic.    Coding  ======    Code: 77464  Description: Ultrasound, pregnant uterus, real time with image documentation, limited one or more fetuses  Code: 58993  Description: Doppler velocimetry, fetal; umbilical artery

## 2024-10-21 NOTE — OP NOTE
Operative Note    Name: Rochelle Giraldo   Medical Record Number: [id number]   YOB: 1995  Today's Date: 2024      Pre-operative Diagnosis: Severe pre-eclampsia in third trimester [O14.13] 32 weeks    Post-operative Diagnosis: PTLBFI, IUGR    Operation: low transverse  section Procedure(s):   SECTION    Surgeon(s):  Bernadette Mirza MD    Anesthesia: Spinal    EBL: 750cc    Prophylactic Antibiotics: Ancef  DVT Prophylaxis: Sequential Compression Devices         Fetal Description: campos     Birth Information:   Information for the patient's :  Beatriz Giraldo, Female Rochelle [[id number]]   @[id number]@    Umbilical Cord: Nuchal Cord x  1    Placenta:  manual removal    Specimens: placenta, cord pH           Complications:  none    Implants: none    Surgical Assistant: Solo Michael  Scrub Person First: Adriana Hayes      Procedure Detail:      After proper patient identification and consent, the patient was taken to the operating room, where spinal anesthesia was administered and found to be adequate. Del Toro catheter had been placed using sterile technique.  The patient was prepped and draped in the normal sterile fashion.The abdomen was entered using the Pfannenstiel technique. The peritoneum was entered bluntely well superior to the bladder without any apparent injury. An Shayne retractor was placed.  Palpation revealed no bowel below the retractor. The bladder flap was created without difficulty. A low transverse uterine incision was made with the scalpel and extended with blunt finger dissection. Amniotomy was performed and the fluid was medium amount clear.  The baby’s head elevated and the remainder of the infant was then delivered atraumatically. The nose and mouth were suctioned. The cord was clamped and cut and the baby was handed off to  staff in attendance. The placenta was expressed. The uterus was wiped clean with a

## 2024-10-21 NOTE — PROGRESS NOTES
Magnesium Check -  Progress Note  Patient seen, fetal heart rate and contraction pattern evaluated, patient examined.    Subjective:     Patient seen and examined at the bedside. Endorses that she feels better than on admission, still does note she has a small headache. No blurry vision, shortness of breath at this time.         Physical Exam:    General: Trace bilateral extremity edema. BLE reflexes 2+, alert and oriented, no acute distress. Abdomen gravid, nontender to palpation.    Fetal Heart Rate: Reactive  Baseline: 120 per minute  Variability: moderate  Accelerations: yes  Decelerations: none    Vitals:    10/20/24 1935   BP: 131/83   Pulse: 73   Resp:    Temp:    SpO2:        Assessment/Plan     Rochelle Giraldo is a 28 y.o. female  at 32w0d who presented to L&D for severe pre-eclampsia    Severe pre-eclampsia: POA 's systolic, improved s/p dose 20 mg PO nifedipine and 20 mg IV labetalol. Symptomatic with HA, blurry vision, dyspnea. Loaded with Mg and now on 2g q1h. Patient with much improved symptoms at this time, endorsing mild headache. UPCR noted 14.3.  - Plan for Mg level checks q8h in setting of patient with improved symptoms, supply shortage   - Continue Mg checks q4h for symptom changes  - Mg 2g q1h IV   - Nifedipine 10 mg PO TID     A1GDM:  A1c 5.7. Diet controlled  - Fasting and 2h PP glucose checks    PNL: O+, Ab screen neg, HIV/HCV/RPR NR, C/Gcneg,  Hep B NI,  VZV/Rubella immune, hgb 11.1, Hbg fract WNL  Ultrasound at 30w1d with EFW 9%.      I have discussed the diagnosis with the patient and the intended plan as seen in the above orders.  All questions were answered concerning future plans.  I have discussed medication side effects and warnings with the patient as well.     Pt discussed with Dr. Angel (OB Hospitalist)   Vern Lao DO  Family Practice Resident

## 2024-10-21 NOTE — CONSULTS
MATERNAL FETAL MEDICINE  INPATIENT CONSULTATION    REQUESTED BY: Eddie Johnson MD   DATE OF CONSULT: 10/21/24    REASON FOR CONSULTATION: preE w SF     Rochelle Giraldo is a 28 y.o. female  at 32w1d.    HPI  Patient presented yesterday not feeling well all of a sudden. She had increased edema, SOB, and persistent headache. Today consult for management of preE w SF     Patient Active Problem List   Diagnosis    Pelvic pressure in pregnancy    Elevated glucose tolerance test    Pregnancy with 32 completed weeks gestation       Past Medical History:   Diagnosis Date    Anxiety     Depression     Gestational diabetes     Pregnancy due        Past Surgical History:   Procedure Laterality Date    DILATION AND CURETTAGE OF UTERUS      LIPOSUCTION         OB History    Para Term  AB Living   3 1 0 1 1 1   SAB IAB Ectopic Molar Multiple Live Births   1         1      # Outcome Date GA Lbr Benedict/2nd Weight Sex Type Anes PTL Lv   3 Current            2 SAB 2023 12w0d          1  08/10/11 32w0d   F Vag-Spont   TAYLOR      Obstetric Comments     at 8 months gestation   First trimester TAB       No Known Allergies      Current Facility-Administered Medications:     insulin lispro (HUMALOG,ADMELOG) injection vial 0-16 Units, 0-16 Units, SubCUTAneous, 4x Daily AC & HS, Kris Angel Jr., MD, 4 Units at 10/21/24 0742    glucose chewable tablet 16 g, 4 tablet, Oral, PRN, Kris Angel Jr., MD    dextrose bolus 10% 125 mL, 125 mL, IntraVENous, PRN **OR** dextrose bolus 10% 250 mL, 250 mL, IntraVENous, PRN, Kris Angel Jr., MD    glucagon injection 1 mg, 1 mg, SubCUTAneous, PRN, Kris Angel Jr., MD    dextrose 10 % infusion, , IntraVENous, Continuous PRN, Kris Angel Jr., MD    metoclopramide (REGLAN) tablet 10 mg, 10 mg, Oral, Once, Eddie Johnson MD    NIFEdipine (PROCARDIA XL) extended release tablet 30 mg, 30 mg, Oral, BID, Rylee Fournier MD    insulin NPH

## 2024-10-21 NOTE — ANESTHESIA PROCEDURE NOTES
Spinal Block    Patient location during procedure: OR  End time: 10/21/2024 4:19 PM  Reason for block: primary anesthetic and at surgeon's request  Staffing  Performed: resident/CRNA   Resident/CRNA: Marissa Zheng APRN - CRNA  Performed by: Marissa Zheng APRN - CRNA  Authorized by: Marissa Zheng APRN - CRNA    Spinal Block  Patient position: sitting  Prep: DuraPrep  Patient monitoring: continuous pulse ox and frequent blood pressure checks  Approach: midline  Location: L3/L4  Provider prep: mask and sterile gloves  Needle  Needle type: Pencan   Needle gauge: 25 G  Assessment  Sensory level: T6  Swirl obtained: Yes  CSF: clear  Attempts: 2  Hemodynamics: stable  Preanesthetic Checklist  Completed: patient identified, IV checked, site marked, risks and benefits discussed, surgical/procedural consents, equipment checked, pre-op evaluation, timeout performed, anesthesia consent given, oxygen available, monitors applied/VS acknowledged, fire risk safety assessment completed and verbalized and blood product R/B/A discussed and consented

## 2024-10-21 NOTE — PROGRESS NOTES
Dr. Angel made aware of Fasting FSBS 186. States will put in sliding scale orders to start with breakfast    Bedside and Verbal shift change report given to CHAPIN Sousa  (oncoming nurse) by Cristina BRANTLEY RN  (offgoing nurse). Report included the following information Nurse Handoff Report, Adult Overview, Intake/Output, MAR, and Recent Results.

## 2024-10-21 NOTE — CONSULTS
Consult completed prior to delivery  Full documentation to follow  Chloé Sanford MD   Neonatology  10/21/24@6911

## 2024-10-21 NOTE — L&D DELIVERY NOTE
eBatriz Giraldo, Agustin Byrd [[id number]]      Labor Events     Labor: No   Steroids: None  Cervical Ripening Date/Time:      Antibiotics Received during Labor: No  Rupture Identifier: Sac 1  Rupture Date/Time:  10/21/24 07:00:00   Rupture Type: Intact  Fluid Color: Clear  Fluid Odor: None  Fluid Volume: Moderate       Anesthesia    Method: Spinal       Delivery Details      Delivery Date: 10/21/24 Delivery Time: 16:37:00   Delivery Type: , Low Transverse  Trial of Labor?: No   Categorization: Primary   Priority: unscheduled   Other  Indications:  PROCEDURAL - OBSTETRIC - DELIVERY -  SECTION - INDICATIONS FOR  SECTION   severe pre-e      Skin Incision Type: Low Transverse       [place] Presentation    Presentation: Vertex       Shoulder Dystocia    Shoulder Dystocia Present?: No       Assisted Delivery Details    Forceps Attempted?: No  Vacuum Extractor Attempted?: No                           Cord    Vessels: 3 Vessels  Complications: Nuchal Loose  Cord Around: Head  Delayed Cord Clamping?: Yes  Cord Clamped Date/Time: 10/21/2024 16:38:00  Cord Blood Disposition: Lab  Gases Sent?: Yes              Placenta    Date/Time: 10/21/2024 16:38:00  Removal: Manual Removal  Appearance: Intact  Disposition: Lab       Lacerations           Blood Loss  Mother: Rochelle Pelaez [place] #[id number]     Start of Mother's Information      Delivery Blood Loss   Intrapartum & Postpartum: 10/21/24 1601 - 10/21/24 1740    Delivery Admission: 10/20/24 1605 - 10/21/24 1740         Intrapartum & Postpartum Delivery Admission    Quantitative Blood Loss (mL) Hospital Encounter 935 grams 935 grams    Total  935 mL 935 mL               End of Mother's Information  Mother: Rochelle Pelaez [place] #[id number]                Delivery Providers    Delivering clinician: Bernadette Mirza MD     Provider Role    Bernadette Mirza MD Obstetrician    Emmanuelle Ames RN

## 2024-10-21 NOTE — ANESTHESIA PRE PROCEDURE
Department of Anesthesiology  Preprocedure Note       Name:  Rochelle Giraldo   Age:  28 y.o.  :  1995                                          MRN:  [id number]         Date:  10/21/2024      Surgeon: Surgeon(s):  Bernadette Mirza MD    Procedure: Procedure(s):   SECTION    Medications prior to admission:   Prior to Admission medications    Medication Sig Start Date End Date Taking? Authorizing Provider   folic acid (FOLVITE) 1 MG tablet Take 1 tablet by mouth daily   Yes Pato Sarmiento MD   Prenatal Vit-Fe Fumarate-FA (PRENATAL VITAMINS PO) Take by mouth   Yes Pato Sarmiento MD   aspirin 81 MG EC tablet Take 1 tablet by mouth daily 24  Yes Angela Amezquita MD   blood glucose monitor kit and supplies Dispense sufficient amount for indicated testing frequency plus additional to accommodate PRN testing needs. Dispense all needed supplies to include: monitor, strips, lancing device, lancets, control solutions, alcohol swabs. 24   Angela Amezquita MD   Lancets MISC 1 each by Does not apply route daily 24   Angela Amezquita MD   blood glucose monitor strips Test 4 times a day & as needed for symptoms of irregular blood glucose. Dispense sufficient amount for indicated testing frequency plus additional to accommodate PRN testing needs. 24   Angela Amezquita MD   ondansetron (ZOFRAN-ODT) 4 MG disintegrating tablet Take 1 tablet by mouth every 8 hours as needed for Nausea or Vomiting  Patient not taking: Reported on 2024   Adama Kunz MD   ondansetron (ZOFRAN-ODT) 4 MG disintegrating tablet Take 1 tablet by mouth 3 times daily as needed for Nausea or Vomiting  Patient not taking: Reported on 2024   Adama Kunz MD   acetaminophen (TYLENOL) 500 MG tablet Take 2 tablets by mouth every 6 hours as needed for Pain or Fever  Patient not taking: Reported on 2024   Radha Stone PA   cetirizine

## 2024-10-21 NOTE — PROGRESS NOTES
Attempted to meet with Ms. Beatriz Giraldo per discussion with Dr. Johnson for prenatal consult before planned  delivery. Patient needing to be transported off of the floor to CT.   Will attempt to return to meet with her prior to delivery.    Chloé Sanford MD  Neonatology 10/21/24@4987

## 2024-10-21 NOTE — PROGRESS NOTES
Magnesium Check -  Progress Note  Patient seen, fetal heart rate and contraction pattern evaluated, patient examined.    0405: Patient seen and examined, reports return of mild headache, rated 4. Denies blurry vision at this time.       Physical Exam:    General: No acute distress, trace edema BLE to the foot, 2+ reflexes. Soft, nontender abdomen.  Membranes:  Intact    Assessment/Plan     Rochelle Giraldo is a 28 y.o. female  at 32w0d who presented to L&D for severe pre-eclampsia     Severe pre-eclampsia: POA 's systolic, improved s/p dose 20 mg PO nifedipine and 20 mg IV labetalol. Loaded with Mg and now on 2g q1h. UPCR noted 14.3. Headache 4/10 from 3  - Fiorcet q4h prn for continued headache   - Plan for Mg level checks q8h in setting of patient with improved symptoms, supply shortage   - Continue Mg checks q4h for symptom changes  - Mg 2g q1h IV   - Nifedipine 10 mg PO TID      A1GDM:  A1c 5.7. Diet controlled  - Fasting and 2h PP glucose checks     PNL: O+, Ab screen neg, HIV/HCV/RPR NR, C/Gcneg,  Hep B NI,  VZV/Rubella immune, hgb 11.1, Hbg fract WNL  Ultrasound at 30w1d with EFW 9%.       Patient will be discussed with Dr. Angel (OB Hospitalist)   Vern Lao DO  Family Practice Resident

## 2024-10-22 ENCOUNTER — APPOINTMENT (OUTPATIENT)
Facility: HOSPITAL | Age: 29
DRG: 540 | End: 2024-10-22
Payer: MEDICAID

## 2024-10-22 LAB
ALBUMIN SERPL-MCNC: 2 G/DL (ref 3.5–5)
ALBUMIN/GLOB SERPL: 0.6 (ref 1.1–2.2)
ALP SERPL-CCNC: 160 U/L (ref 45–117)
ALT SERPL-CCNC: 19 U/L (ref 12–78)
ANION GAP SERPL CALC-SCNC: 7 MMOL/L (ref 2–12)
ANION GAP SERPL CALC-SCNC: 9 MMOL/L (ref 2–12)
AST SERPL-CCNC: 20 U/L (ref 15–37)
BILIRUB SERPL-MCNC: 0.2 MG/DL (ref 0.2–1)
BUN SERPL-MCNC: 24 MG/DL (ref 6–20)
BUN SERPL-MCNC: 24 MG/DL (ref 6–20)
BUN/CREAT SERPL: 23 (ref 12–20)
BUN/CREAT SERPL: 24 (ref 12–20)
CALCIUM SERPL-MCNC: 7.7 MG/DL (ref 8.5–10.1)
CALCIUM SERPL-MCNC: 7.8 MG/DL (ref 8.5–10.1)
CHLORIDE SERPL-SCNC: 102 MMOL/L (ref 97–108)
CHLORIDE SERPL-SCNC: 104 MMOL/L (ref 97–108)
CO2 SERPL-SCNC: 21 MMOL/L (ref 21–32)
CO2 SERPL-SCNC: 22 MMOL/L (ref 21–32)
CREAT SERPL-MCNC: 1 MG/DL (ref 0.55–1.02)
CREAT SERPL-MCNC: 1.06 MG/DL (ref 0.55–1.02)
ECHO AO ARCH DIAM: 2.3 CM
ECHO AO ASC DIAM: 2.6 CM
ECHO AO ASCENDING AORTA INDEX: 1.31 CM/M2
ECHO AO ROOT DIAM: 2.5 CM
ECHO AO ROOT INDEX: 1.26 CM/M2
ECHO AV AREA PEAK VELOCITY: 2.1 CM2
ECHO AV AREA VTI: 2.1 CM2
ECHO AV AREA/BSA PEAK VELOCITY: 1.1 CM2/M2
ECHO AV AREA/BSA VTI: 1.1 CM2/M2
ECHO AV MEAN GRADIENT: 6 MMHG
ECHO AV MEAN VELOCITY: 1.1 M/S
ECHO AV PEAK GRADIENT: 12 MMHG
ECHO AV PEAK VELOCITY: 1.7 M/S
ECHO AV VELOCITY RATIO: 0.76
ECHO AV VTI: 36.8 CM
ECHO BSA: 2.06 M2
ECHO LA DIAMETER INDEX: 1.46 CM/M2
ECHO LA DIAMETER: 2.9 CM
ECHO LA TO AORTIC ROOT RATIO: 1.16
ECHO LA VOL A-L A2C: 37 ML (ref 22–52)
ECHO LA VOL A-L A4C: 26 ML (ref 22–52)
ECHO LA VOL BP: 29 ML (ref 22–52)
ECHO LA VOL MOD A2C: 34 ML (ref 22–52)
ECHO LA VOL MOD A4C: 24 ML (ref 22–52)
ECHO LA VOL/BSA BIPLANE: 15 ML/M2 (ref 16–34)
ECHO LA VOLUME AREA LENGTH: 31 ML
ECHO LA VOLUME INDEX A-L A2C: 19 ML/M2 (ref 16–34)
ECHO LA VOLUME INDEX A-L A4C: 13 ML/M2 (ref 16–34)
ECHO LA VOLUME INDEX AREA LENGTH: 16 ML/M2 (ref 16–34)
ECHO LA VOLUME INDEX MOD A2C: 17 ML/M2 (ref 16–34)
ECHO LA VOLUME INDEX MOD A4C: 12 ML/M2 (ref 16–34)
ECHO LV E' LATERAL VELOCITY: 13.7 CM/S
ECHO LV E' SEPTAL VELOCITY: 11.4 CM/S
ECHO LV EDV A2C: 39 ML
ECHO LV EDV A4C: 62 ML
ECHO LV EDV BP: 53 ML (ref 56–104)
ECHO LV EDV INDEX A4C: 31 ML/M2
ECHO LV EDV INDEX BP: 27 ML/M2
ECHO LV EDV NDEX A2C: 20 ML/M2
ECHO LV EJECTION FRACTION A2C: 57 %
ECHO LV EJECTION FRACTION A4C: 66 %
ECHO LV EJECTION FRACTION BIPLANE: 64 % (ref 55–100)
ECHO LV ESV A2C: 17 ML
ECHO LV ESV A4C: 21 ML
ECHO LV ESV BP: 19 ML (ref 19–49)
ECHO LV ESV INDEX A2C: 9 ML/M2
ECHO LV ESV INDEX A4C: 11 ML/M2
ECHO LV ESV INDEX BP: 10 ML/M2
ECHO LV FRACTIONAL SHORTENING: 48 % (ref 28–44)
ECHO LV INTERNAL DIMENSION DIASTOLE INDEX: 2.42 CM/M2
ECHO LV INTERNAL DIMENSION DIASTOLIC: 4.8 CM (ref 3.9–5.3)
ECHO LV INTERNAL DIMENSION SYSTOLIC INDEX: 1.26 CM/M2
ECHO LV INTERNAL DIMENSION SYSTOLIC: 2.5 CM
ECHO LV IVSD: 0.6 CM (ref 0.6–0.9)
ECHO LV MASS 2D: 106.9 G (ref 67–162)
ECHO LV MASS INDEX 2D: 54 G/M2 (ref 43–95)
ECHO LV POSTERIOR WALL DIASTOLIC: 0.8 CM (ref 0.6–0.9)
ECHO LV RELATIVE WALL THICKNESS RATIO: 0.33
ECHO LVOT AREA: 2.8 CM2
ECHO LVOT AV VTI INDEX: 0.73
ECHO LVOT DIAM: 1.9 CM
ECHO LVOT MEAN GRADIENT: 3 MMHG
ECHO LVOT PEAK GRADIENT: 6 MMHG
ECHO LVOT PEAK VELOCITY: 1.3 M/S
ECHO LVOT STROKE VOLUME INDEX: 38.4 ML/M2
ECHO LVOT SV: 75.9 ML
ECHO LVOT VTI: 26.8 CM
ECHO MV A VELOCITY: 0.78 M/S
ECHO MV E DECELERATION TIME (DT): 292 MS
ECHO MV E VELOCITY: 0.91 M/S
ECHO MV E/A RATIO: 1.17
ECHO MV E/E' LATERAL: 6.64
ECHO MV E/E' RATIO (AVERAGED): 7.31
ECHO MV E/E' SEPTAL: 7.98
ECHO MV REGURGITANT PEAK GRADIENT: 9 MMHG
ECHO MV REGURGITANT PEAK VELOCITY: 1.5 M/S
ECHO PV MAX VELOCITY: 1.3 M/S
ECHO PV PEAK GRADIENT: 7 MMHG
ECHO RV FREE WALL PEAK S': 6.7 CM/S
ECHO RV INTERNAL DIMENSION: 4.3 CM
ECHO RV TAPSE: 2 CM (ref 1.7–?)
ECHO RVOT MEAN GRADIENT: 2 MMHG
ECHO RVOT PEAK GRADIENT: 5 MMHG
ECHO RVOT PEAK VELOCITY: 1.1 M/S
ECHO RVOT VTI: 25.7 CM
ECHO TV REGURGITANT MAX VELOCITY: 1.54 M/S
ECHO TV REGURGITANT PEAK GRADIENT: 10 MMHG
ERYTHROCYTE [DISTWIDTH] IN BLOOD BY AUTOMATED COUNT: 14.6 % (ref 11.5–14.5)
GLOBULIN SER CALC-MCNC: 3.4 G/DL (ref 2–4)
GLUCOSE BLD STRIP.AUTO-MCNC: 127 MG/DL (ref 65–117)
GLUCOSE BLD STRIP.AUTO-MCNC: 164 MG/DL (ref 65–117)
GLUCOSE SERPL-MCNC: 121 MG/DL (ref 65–100)
GLUCOSE SERPL-MCNC: 126 MG/DL (ref 65–100)
HCT VFR BLD AUTO: 29.4 % (ref 35–47)
HGB BLD-MCNC: 9.6 G/DL (ref 11.5–16)
MAGNESIUM SERPL-MCNC: 6.1 MG/DL (ref 1.6–2.4)
MAGNESIUM SERPL-MCNC: 8 MG/DL (ref 1.6–2.4)
MCH RBC QN AUTO: 28.2 PG (ref 26–34)
MCHC RBC AUTO-ENTMCNC: 32.7 G/DL (ref 30–36.5)
MCV RBC AUTO: 86.5 FL (ref 80–99)
NRBC # BLD: 0 K/UL (ref 0–0.01)
NRBC BLD-RTO: 0 PER 100 WBC
PLATELET # BLD AUTO: 338 K/UL (ref 150–400)
PMV BLD AUTO: 11.8 FL (ref 8.9–12.9)
POTASSIUM SERPL-SCNC: 4.7 MMOL/L (ref 3.5–5.1)
POTASSIUM SERPL-SCNC: 5.1 MMOL/L (ref 3.5–5.1)
PROT SERPL-MCNC: 5.4 G/DL (ref 6.4–8.2)
RBC # BLD AUTO: 3.4 M/UL (ref 3.8–5.2)
SERVICE CMNT-IMP: ABNORMAL
SERVICE CMNT-IMP: ABNORMAL
SODIUM SERPL-SCNC: 132 MMOL/L (ref 136–145)
SODIUM SERPL-SCNC: 133 MMOL/L (ref 136–145)
WBC # BLD AUTO: 17.9 K/UL (ref 3.6–11)

## 2024-10-22 PROCEDURE — 6370000000 HC RX 637 (ALT 250 FOR IP)

## 2024-10-22 PROCEDURE — 6360000002 HC RX W HCPCS

## 2024-10-22 PROCEDURE — 2580000003 HC RX 258

## 2024-10-22 PROCEDURE — 99233 SBSQ HOSP IP/OBS HIGH 50: CPT | Performed by: FAMILY MEDICINE

## 2024-10-22 PROCEDURE — 93306 TTE W/DOPPLER COMPLETE: CPT

## 2024-10-22 PROCEDURE — 6360000002 HC RX W HCPCS: Performed by: FAMILY MEDICINE

## 2024-10-22 PROCEDURE — 83735 ASSAY OF MAGNESIUM: CPT

## 2024-10-22 PROCEDURE — 93306 TTE W/DOPPLER COMPLETE: CPT | Performed by: STUDENT IN AN ORGANIZED HEALTH CARE EDUCATION/TRAINING PROGRAM

## 2024-10-22 PROCEDURE — 2580000003 HC RX 258: Performed by: STUDENT IN AN ORGANIZED HEALTH CARE EDUCATION/TRAINING PROGRAM

## 2024-10-22 PROCEDURE — 85027 COMPLETE CBC AUTOMATED: CPT

## 2024-10-22 PROCEDURE — 6360000002 HC RX W HCPCS: Performed by: STUDENT IN AN ORGANIZED HEALTH CARE EDUCATION/TRAINING PROGRAM

## 2024-10-22 PROCEDURE — 6370000000 HC RX 637 (ALT 250 FOR IP): Performed by: STUDENT IN AN ORGANIZED HEALTH CARE EDUCATION/TRAINING PROGRAM

## 2024-10-22 PROCEDURE — 82962 GLUCOSE BLOOD TEST: CPT

## 2024-10-22 PROCEDURE — 36415 COLL VENOUS BLD VENIPUNCTURE: CPT

## 2024-10-22 PROCEDURE — 1120000000 HC RM PRIVATE OB

## 2024-10-22 PROCEDURE — 80053 COMPREHEN METABOLIC PANEL: CPT

## 2024-10-22 PROCEDURE — 2500000003 HC RX 250 WO HCPCS

## 2024-10-22 RX ORDER — INSULIN LISPRO 100 [IU]/ML
0-16 INJECTION, SOLUTION INTRAVENOUS; SUBCUTANEOUS
Status: DISCONTINUED | OUTPATIENT
Start: 2024-10-22 | End: 2024-10-22

## 2024-10-22 RX ORDER — FUROSEMIDE 20 MG/1
20 TABLET ORAL DAILY
Status: CANCELLED | OUTPATIENT
Start: 2024-10-23

## 2024-10-22 RX ORDER — FAMOTIDINE 20 MG/1
20 TABLET, FILM COATED ORAL 2 TIMES DAILY
Status: DISCONTINUED | OUTPATIENT
Start: 2024-10-22 | End: 2024-10-22 | Stop reason: DRUGHIGH

## 2024-10-22 RX ORDER — IBUPROFEN 800 MG/1
800 TABLET, FILM COATED ORAL EVERY 8 HOURS
Status: DISCONTINUED | OUTPATIENT
Start: 2024-10-22 | End: 2024-10-25 | Stop reason: HOSPADM

## 2024-10-22 RX ORDER — FUROSEMIDE 10 MG/ML
20 INJECTION INTRAMUSCULAR; INTRAVENOUS ONCE
Status: COMPLETED | OUTPATIENT
Start: 2024-10-22 | End: 2024-10-22

## 2024-10-22 RX ORDER — KETOROLAC TROMETHAMINE 30 MG/ML
30 INJECTION, SOLUTION INTRAMUSCULAR; INTRAVENOUS EVERY 6 HOURS
Status: COMPLETED | OUTPATIENT
Start: 2024-10-22 | End: 2024-10-22

## 2024-10-22 RX ADMIN — ACETAMINOPHEN 1000 MG: 500 TABLET ORAL at 08:56

## 2024-10-22 RX ADMIN — NIFEDIPINE 30 MG: 30 TABLET, FILM COATED, EXTENDED RELEASE ORAL at 08:53

## 2024-10-22 RX ADMIN — IBUPROFEN 800 MG: 800 TABLET, FILM COATED ORAL at 22:17

## 2024-10-22 RX ADMIN — INSULIN HUMAN 6 UNITS: 100 INJECTION, SUSPENSION SUBCUTANEOUS at 17:56

## 2024-10-22 RX ADMIN — ONDANSETRON 4 MG: 2 INJECTION INTRAMUSCULAR; INTRAVENOUS at 01:52

## 2024-10-22 RX ADMIN — KETOROLAC TROMETHAMINE 30 MG: 30 INJECTION, SOLUTION INTRAMUSCULAR at 07:11

## 2024-10-22 RX ADMIN — OXYCODONE 5 MG: 5 TABLET ORAL at 15:06

## 2024-10-22 RX ADMIN — BUTALBITAL, ACETAMINOPHEN, AND CAFFEINE 1 TABLET: 325; 50; 40 TABLET ORAL at 17:18

## 2024-10-22 RX ADMIN — MAGNESIUM SULFATE HEPTAHYDRATE 2000 MG/HR: 40 INJECTION, SOLUTION INTRAVENOUS at 02:14

## 2024-10-22 RX ADMIN — DOCUSATE SODIUM 100 MG: 100 CAPSULE, LIQUID FILLED ORAL at 09:03

## 2024-10-22 RX ADMIN — SODIUM CHLORIDE, POTASSIUM CHLORIDE, SODIUM LACTATE AND CALCIUM CHLORIDE: 600; 310; 30; 20 INJECTION, SOLUTION INTRAVENOUS at 08:01

## 2024-10-22 RX ADMIN — KETOROLAC TROMETHAMINE 30 MG: 30 INJECTION, SOLUTION INTRAMUSCULAR at 01:05

## 2024-10-22 RX ADMIN — ACETAMINOPHEN 1000 MG: 500 TABLET ORAL at 17:56

## 2024-10-22 RX ADMIN — FAMOTIDINE 20 MG: 10 INJECTION, SOLUTION INTRAVENOUS at 01:52

## 2024-10-22 RX ADMIN — NIFEDIPINE 30 MG: 30 TABLET, FILM COATED, EXTENDED RELEASE ORAL at 22:17

## 2024-10-22 RX ADMIN — DOCUSATE SODIUM 100 MG: 100 CAPSULE, LIQUID FILLED ORAL at 22:17

## 2024-10-22 RX ADMIN — DIPHENHYDRAMINE HYDROCHLORIDE 25 MG: 25 CAPSULE ORAL at 02:05

## 2024-10-22 RX ADMIN — KETOROLAC TROMETHAMINE 30 MG: 30 INJECTION, SOLUTION INTRAMUSCULAR at 14:06

## 2024-10-22 RX ADMIN — FUROSEMIDE 20 MG: 10 INJECTION, SOLUTION INTRAMUSCULAR; INTRAVENOUS at 08:16

## 2024-10-22 ASSESSMENT — PAIN SCALES - GENERAL
PAINLEVEL_OUTOF10: 5
PAINLEVEL_OUTOF10: 6
PAINLEVEL_OUTOF10: 7
PAINLEVEL_OUTOF10: 6
PAINLEVEL_OUTOF10: 6
PAINLEVEL_OUTOF10: 0
PAINLEVEL_OUTOF10: 6
PAINLEVEL_OUTOF10: 6

## 2024-10-22 ASSESSMENT — PAIN DESCRIPTION - DESCRIPTORS
DESCRIPTORS: SORE
DESCRIPTORS: ACHING;SORE
DESCRIPTORS: PRESSURE
DESCRIPTORS: ACHING;SORE
DESCRIPTORS: PRESSURE

## 2024-10-22 ASSESSMENT — PAIN DESCRIPTION - LOCATION
LOCATION: INCISION
LOCATION: ABDOMEN
LOCATION: HEAD
LOCATION: HEAD
LOCATION: ABDOMEN
LOCATION: ABDOMEN

## 2024-10-22 ASSESSMENT — PAIN DESCRIPTION - ORIENTATION
ORIENTATION: LOWER
ORIENTATION: LOWER
ORIENTATION: ANTERIOR
ORIENTATION: LOWER
ORIENTATION: ANTERIOR

## 2024-10-22 ASSESSMENT — PAIN - FUNCTIONAL ASSESSMENT
PAIN_FUNCTIONAL_ASSESSMENT: ACTIVITIES ARE NOT PREVENTED
PAIN_FUNCTIONAL_ASSESSMENT: ACTIVITIES ARE NOT PREVENTED

## 2024-10-22 NOTE — PROGRESS NOTES
Postpartum Magnesium Check - Progress Note  Patient seen and examined. Previous check at 2100: Discussed MRI results with patient. No acute changes in symptoms. Nausea is improving after zofran.     0100: Patient denies any headaches, changes in vision or other changes in symptoms. Still nauseous and vomited once since last check. Reporting increase in reflux symptoms.    Patient Vitals for the past 4 hrs:   Temp Pulse Resp BP SpO2   10/21/24 2358 -- -- -- -- 98 %   10/21/24 2354 -- -- -- -- 94 %   10/21/24 2353 -- -- -- -- 95 %   10/21/24 2348 -- -- -- -- 100 %   10/21/24 2343 -- -- -- -- 100 %   10/21/24 2338 -- -- -- -- 100 %   10/21/24 2334 -- 80 -- 126/83 --   10/21/24 2330 97.6 °F (36.4 °C) -- 16 -- --   10/21/24 2308 -- -- -- -- 99 %   10/21/24 2304 -- 77 -- 131/81 --   10/21/24 2303 -- 82 -- -- 99 %   10/21/24 2253 -- 84 -- -- 100 %   10/21/24 2235 -- 83 -- 118/68 --   10/21/24 2233 -- 78 -- -- 100 %   10/21/24 2230 -- -- 16 -- --   10/21/24 2228 -- 78 -- -- 100 %   10/21/24 2223 -- -- -- -- 99 %   10/21/24 2218 -- -- -- -- 100 %   10/21/24 2130 -- -- 16 -- --   10/21/24 2052 -- 71 -- (!) 140/86 --   10/21/24 2030 -- -- 14 -- --   10/21/24 2022 -- 71 -- 136/89 --        Physical Exam:  Gen: awake alert no acute distress  Cardio: RRR, no MRG, pulses 2+  Chest: lungs CTAB no wheezes or crackles  Abd: soft nontender, fundus firm  Ext: SCDs in place, previously mild trace LE edema appears unchanged, 2+ DTR upper and lower extremity, unchanged from previous exam.     Assessment/Plan   Rochelle Giraldo is a 28 y.o.  female at 32w1d who presented for severe pre-eclampsia, now s/p LTCS, POD1.     Severe Pre-Eclampsia. POA BP in the 180s, now improving. Currently on Mag 2g q1hr. Patient denying any new headaches or vision changes. MRI showing trace FLAIR signal possibly related to PRES. Mg within therapeutic range with previous check, no changes to physical exam or symptoms per patient.

## 2024-10-22 NOTE — PROGRESS NOTES
Postpartum Magnesium Check - Progress Note  Patient seen and examined. Previous check at 0100    0500: Patient denies any headaches, changes in vision or other changes in symptoms. She says that her reflux improved with the medication and she has not been as nauseous and has not vomited since the last check. She denies any feelings of dizziness or light headedness.     Patient Vitals for the past 4 hrs:   Pulse Resp BP SpO2   10/22/24 0430 -- 18 -- --   10/22/24 0413 -- -- -- 97 %   10/22/24 0408 -- -- -- 100 %   10/22/24 0405 73 -- (!) 113/57 --   10/22/24 0330 -- 16 -- --   10/22/24 0305 75 -- (!) 116/58 --   10/22/24 0234 73 -- 127/76 --   10/22/24 0230 -- 16 -- --   10/22/24 0218 -- -- -- 100 %   10/22/24 0216 78 -- 131/85 --   10/22/24 0213 79 -- -- 100 %   10/22/24 0208 78 -- -- 100 %   10/22/24 0205 82 -- (!) 128/91 --   10/22/24 0130 -- 16 -- --        Physical Exam:  Gen: awake alert no acute distress  Cardio: RRR, no MRG, pulses 2+  Chest: lungs CTAB no wheezes or crackles  Abd: soft nontender, fundus firm  Ext: SCDs in place, previously mild trace LE edema appears unchanged, 2+ DTR upper and lower extremity, unchanged from previous exam.     Assessment/Plan   Rochelle Giraldo is a 28 y.o.  female at 32w1d who presented for severe pre-eclampsia, now s/p LTCS, POD1.     Severe Pre-Eclampsia. POA BP in the 180s, now improving. Currently on Mag 2g q1hr. Patient denying any new headaches or vision changes. MRI showing trace FLAIR signal possibly related to PRES. Mg within therapeutic range with previous check, no changes to physical exam or symptoms per patient. BP on the softer side, pt denying any symptoms of dizziness or lightheadedness at this time.   - Continue Mg 2g q1h IV for 24 hrs post delivery   - Continue Mg PE checks q4  - Nifedipine 30 mg XR BID, can consider reducing if pressures remain on lower side.     Nausea/Vomiting. Resolved. Improved with Zofran and famotidine. Reflux  symptoms have resolved. Still denying abdominal pain. No new vomiting since last check.   - Consider scheduled famotidine    A1GDM: A1c 5.7. Diet controlled. Hyperglycemic iso steroids. Received 10u total of insulin today. Most recent glucose 126.   - Continue glucose checks  - Continue SSI  - NPH 10u in morning, held evening dose due to concern for hypoglycemia.     IUGR,  delivery. EFW 1437g (1.9%ile). S/p LTCS of female infant who was transferred to NICU care. Apgars 8/9.  - NICU caring for baby     Pt will be discussed with Dr. Johnson ( Attending)   Kt Smalls DO  Family Practice Resident

## 2024-10-22 NOTE — PROGRESS NOTES
[address] [id number]   Office [phone], Fax [phone]    Postpartum Magnesium Note    Subjective: Pt comfortable. Improving dizziness but still notable with sitting upright. Tolerating po, denies N/V.  Reports no HA/scotoma/RUQ pain.  No CP/SOB.    Objective: Patient Vitals for the past 4 hrs:   Pulse Resp BP SpO2   10/22/24 1112 68 -- 111/62 --   10/22/24 1110 71 -- -- --   10/22/24 1105 71 -- -- --   10/22/24 1100 70 -- -- --   10/22/24 1020 -- -- -- 100 %   10/22/24 1015 -- -- -- 100 %   10/22/24 1012 76 16 123/79 --   10/22/24 0928 71 -- 134/80 --   10/22/24 0927 -- 16 -- --   10/22/24 0912 83 16 (!) 143/72 93 %   10/22/24 0836 74 16 122/76 93 %           Intake/Output Summary (Last 24 hours) at 10/22/2024 1238  Last data filed at 10/22/2024 1208  Gross per 24 hour   Intake 3112.36 ml   Output 3819 ml   Net -706.64 ml       Gen: AAO x 3  CV: RRR no m/r/g  Resp: CTAB  Abdomen: gravid but soft, no tenderness in RUQ or fundus  Ext: 2+ DTRs, trace + BLE edema    Interval Imaging:  ECHO (TTE) COMPLETE (PRN CONTRAST/BUBBLE/STRAIN/3D) 10/22/2024  8:25 AM (Final)    Interpretation Summary    Left Ventricle: Normal left ventricular systolic function. EF by 2D Simpsons Biplane is 64%. Left ventricle size is normal. Normal wall thickness. Normal wall motion. Normal diastolic function.    Image quality is adequate.    Signed by: Ld Beck DO on 10/22/2024  8:25 AM      Assessment and Plan:    Rochelle Giraldo is a 28 y.o.  s/p LTCS for severe PreE at 32w1d. Pregnancy was complicated by  A1GDM, prior history of elevated BP in prior pregnancy, history of late  delivery. Delivery uncomplicated.  cc, post-op Hgb 9.6 from 11.2. Patient appears to be having uncomplicated post- course.      POD#1 s/p LTCS for severe PreE  No si/sx of worsening disease. Patient still endorsing mild dizziness with sitting upright but improved from AM. BPs remaining

## 2024-10-22 NOTE — PROGRESS NOTES
2050: RN Bedside with Dr. Smalls. Zofran recent given for patients nausea/throwing up. Dr. Smalls stated to hold NPH orders. MD ordered mag,CMP,CBC at this time.     2138: Call to Dr. Smalls regarding pts MRI results, MD stated he would review results at this time.     2203: This RN bedside with Dr. Smalls to discuss MRI results and lab results reviewed with patient and family present.  used for discussion. Questions answered with patient.     0516: Dr. Smalls bedside with patient, discussed low but adequate urine output throughout the night with MD.     0619: Patient sat up and dangled feet while this RN bedside with another RN. Patient began to feel dizzy so patient laid back down.     0715: RN bedside with resident, morning labs reviewed; orders received to turn mag down to 1g.     0720: Bedside report given to Marleny SAMSON, POC discussed, lines checked, patient care transferred at this time.

## 2024-10-22 NOTE — PROGRESS NOTES
[address] [place] [address]   Office [phone], Fax [phone]    Postpartum Progress Note    Subjective: Pt comfortable. Reporting resolved dizziness. Notes HA has returned at 1700, feels like a pressure on the top of her head.    Tolerating po, denies N/V.  Reports no blurry vision/scotoma/RUQ pain.  No CP/SOB.     Nursing notes separation of right side of  incision with associated mild/persistent serosanguinous fluid from incision.    Objective: Patient Vitals for the past 4 hrs:   Temp Pulse BP   10/22/24 1410 98.5 °F (36.9 °C) 77 123/74           Intake/Output Summary (Last 24 hours) at 10/22/2024 1757  Last data filed at 10/22/2024 1212  Gross per 24 hour   Intake 1942.11 ml   Output 1934 ml   Net 8.11 ml       Gen: AAO x 3  CV: RRR no m/r/g  Resp: CTAB  Abdomen: gravid but soft, no tenderness in RUQ or fundus, appropriately tender at incision site  Ext: 2+ DTRs, trace + BLE edema stable from prior exam  Skin: 1 cm section of  incision site with serosanguinous fluid expressible, non erythematous, nonpurulent exudates      Assessment and Plan:    Rochelle Giraldo is a 28 y.o.  s/p LTCS for severe PreE at 32w1d. Pregnancy was complicated by  A1GDM, prior history of elevated BP in prior pregnancy, history of late  delivery. Delivery uncomplicated.  cc, post-op Hgb 9.6 from 11.2. Patient appears to be having uncomplicated post- course.      POD#1 s/p LTCS for severe PreE  Mild GHOTRA returned this evening ~ 1700, o/w no si/sx of worsening disease. BPs at goal 110s-120s. Pt sill with appropriate UOP > 0.5 cc/kg/hr, s/p lasix 20 mg IV x 2.  Continue routine post-op care and maternal education  For pain control including HA:  S/p fioricet @ 1719   mg q8h prn  Tylenol 1000 mg q8h prn  If refractory, Benadryl + Reglan  S/p Mag, stopped @ 0800  S/p kirk removal, voiding spontaneously  Encourage po hydration  Incision bandage  ABD

## 2024-10-22 NOTE — PROGRESS NOTES
[id number] [id number]   Office [phone], Fax [phone]      Post-Operative Day Number 1 Progress Note  Mag Note    Patient doing well post-op day 1 from  delivery without significant complaints.  Pain well controlled.  Lochia normal.  + N/V OVN but tolerating breakfast this AM.  Ambulating.  [place] in place for strict I/O while on IV mag. Urine output 1.22 cc/kg/hr.  Passing flatus.  No BM.      Denies HA, vision changes, dizziness, CP, SOB, N/V, RUQ pain, epigastric pain, edema, changes in BM or urination.    Vitals:  Patient Vitals for the past 8 hrs:   BP Temp Pulse Resp SpO2 Height Weight   10/22/24 0735 113/63 -- 77 16 99 % -- --   10/22/24 0725 -- -- -- -- 97 % -- --   10/22/24 0719 122/73 -- -- -- -- 1.626 m (5' 4\") 93.9 kg (207 lb)   10/22/24 0634 122/73 -- 77 -- -- -- --   10/22/24 0630 -- 97.9 °F (36.6 °C) -- 16 -- -- --   10/22/24 0604 116/60 -- 79 -- -- -- --   10/22/24 0535 (!) 111/57 -- 84 -- -- -- --   10/22/24 0530 -- -- -- 18 -- -- --   10/22/24 0430 -- -- -- 18 -- -- --   10/22/24 0413 -- -- -- -- 97 % -- --   10/22/24 0408 -- -- -- -- 100 % -- --   10/22/24 0405 (!) 113/57 -- 73 -- -- -- --   10/22/24 0330 -- -- -- 16 -- -- --   10/22/24 0305 (!) 116/58 -- 75 -- -- -- --   10/22/24 0234 127/76 -- 73 -- -- -- --   10/22/24 0230 -- -- -- 16 -- -- --   10/22/24 0218 -- -- -- -- 100 % -- --   10/22/24 0216 131/85 -- 78 -- -- -- --   10/22/24 0213 -- -- 79 -- 100 % -- --   10/22/24 0208 -- -- 78 -- 100 % -- --   10/22/24 0205 (!) 128/91 -- 82 -- -- -- --   10/22/24 0130 -- -- -- 16 -- -- --   10/22/24 0030 -- -- -- 16 -- -- --   10/21/24 2358 -- -- -- -- 98 % -- --   10/21/24 2354 -- -- -- -- 94 % -- --   10/21/24 2353 -- -- -- -- 95 % -- --     Temp (24hrs), Av.8 °F (36.6 °C), Min:97.5 °F (36.4 °C), Max:98.1 °F (36.7 °C)      Vital signs stable, afebrile.    Intake and Output:     Date 10/22/24 0000 - 10/22/24 2359   Shift 9477-1345 2060-5915   fluid collections. There is no Chiari or syrinx. The pituitary and  infundibulum are grossly unremarkable. There is no skull base mass.  Cerebellopontine angles are grossly unremarkable. The major intracranial  vascular flow-voids are unremarkable. The cavernous sinuses are symmetric. Optic  chiasm and infundibulum grossly unremarkable. Orbits are grossly symmetric.  Dural venous sinuses are grossly patent.  The mastoid air cells are well pneumatized and clear.    Impression  Trace FLAIR signal abnormality at the posterior superior right and left parietal  lobes may be related to posterior reversible encephalopathy syndrome.      There is no intracranial mass, hemorrhage or evidence of acute infarction.  No additional acute intracranial process is demonstrated.        Electronically signed by DONITA HABIB      Assessment and Plan:    Rochelle Giraldo is a 28 y.o.  s/p LTCS for severe PreE at 32w1d. Pregnancy was complicated by  A1GDM, prior history of elevated BP in prior pregnancy, history of late  delivery. Delivery uncomplicated.  cc, post-op Hgb 9.6 from 11.2. Patient appears to be having uncomplicated post- course.      POD#1 s/p LTCS for severe PreE  SOB on admission with UPCR 14, CXR showing pulmonary edema. This AM, no si/sx of worsening disease. BPs within range 110-120s systolic OVN. On Mag for seizure prophylaxis, and no si/sx of mag toxicity. Good UOP.  Continue routine post-op care and maternal education.    AM Mag level 8.0, check q4  Magnesium decreased from 2 to 1g/hr IV  Trend CMP  Continue kirk catheter while on IV magnesium for strict I/O  Mag checks q4  Incision bandage may be removed 24 hours post-op.    PRES 2/2 Severe PreE  MRI brain showing \"Trace FLAIR signal abnormality at the posterior superior right and left parietal  Lobes\" c/w/ PRES. Patient AAOx4, non encephalopathic at this time. Notes some dizziness with sitting upright.  Continue nifedipine

## 2024-10-22 NOTE — PROGRESS NOTES
Magnesium Check - Progress Note  Patient seen and examined. She is having some nausea and vomiting at this time. She was given zofran.     Patient denies any headaches, changes in vision or other changes in symptoms.    Patient Vitals for the past 4 hrs:   Temp Pulse Resp BP SpO2   10/21/24 2022 -- 71 -- 136/89 --   10/21/24 2008 -- -- -- -- 99 %   10/21/24 2007 -- 68 -- 131/86 --   10/21/24 1952 -- 75 -- 124/84 --   10/21/24 193 -- -- 16 -- --   10/21/24 1923 -- 73 -- (!) 146/98 100 %   10/21/24 1907 -- 72 -- 137/83 --   10/21/24 1830 98 °F (36.7 °C) 86 17 -- 100 %   10/21/24 1808 -- 74 -- 125/79 --   10/21/24 1806 -- 83 -- -- 100 %   10/21/24 1744 -- 81 -- 134/66 --   10/21/24 1741 -- 77 -- -- 100 %   10/21/24 1730 97.7 °F (36.5 °C) 80 17 -- 99 %   10/21/24 1726 -- 79 -- -- 98 %   10/21/24 1722 -- 76 -- 114/60 --   10/21/24 1715 97.6 °F (36.4 °C) 76 12 112/62 (!) 76 %   10/21/24 1710 -- 74 -- 112/62 98 %        Physical Exam:  Gen: awake alert no acute distress  Cardio: RRR, no MRG, pulses 2+  Chest: lungs CTAB no wheezes or crackles  Abd: soft nontender, fundus firm  Ext: SCDs in place, previously mild trace LE edema, 2+ DTR    Assessment/Plan     Rochelle Giraldo is a 28 y.o.  female at 32w1d who presented for severe pre-eclampsia, now s/p LTCS, POD0.     Severe Pre-Eclampsia. POA BP in the 180s, now improving. Currently on Mag 2g q1hr. Patient denying any new headaches or vision changes.   - MRI results pending due to concern of PRES on CT  - Continue Mg 2g q1h IV for 24 hrs post delivery   - Continue Mg PE checks q4  - Mg and BMP levels ordered now, will consider dose adjustment if needed. Will recheck with morning labs  - Nifedipine 30 mg XR BID    A1GDM: A1c 5.7. Diet controlled. Hyperglycemic iso steroids. Received 10u total of insulin today. Most recent glucose 115.   - Continue glucose checks  - NPH 10u ordered for tonight and tomorrow morning. Due to most recent BG and concern for

## 2024-10-22 NOTE — ANESTHESIA POSTPROCEDURE EVALUATION
Department of Anesthesiology  Postprocedure Note    Patient: Rochelle Giraldo  MRN: [id number]  YOB: 1995  Date of evaluation: 10/21/2024    Procedure Summary       Date: 10/21/24 Room / Location: [facility] L&D OR    Anesthesia Start: 1606 Anesthesia Stop: 1715    Procedure:  SECTION (Abdomen) Diagnosis:       Severe pre-eclampsia in third trimester      (Severe pre-eclampsia in third trimester [O14.13])    Surgeons: Bernadette Mirza MD Responsible Provider: Moody Umanzor MD    Anesthesia Type: Spinal ASA Status: 3            Anesthesia Type: Spinal    Parmjit Phase I: Parmjit Score: 10    Parmjit Phase II: Parmjit Score: 9    Anesthesia Post Evaluation    No notable events documented.

## 2024-10-22 NOTE — PROGRESS NOTES
0731 bedside echo being performed at this time    0845 Dr Blanc and residents at bedside, assessment performed.  Pt discussed intermittent ringing in her ears.  Denies headache or blurry vision    1115 pt resting with eyes closed  1208 pt resting with eyes closed    1522 pt taken to nicu via wheelchair    1600 sbar bedside report given to Maribell RN    1608 clarified with residents regarding blood glucose, Keiko ob resident states okay to do post prandial.  And okay to discontinue kirk.

## 2024-10-23 PROBLEM — O24.410 DIET CONTROLLED GESTATIONAL DIABETES MELLITUS (GDM) IN THIRD TRIMESTER: Status: ACTIVE | Noted: 2024-10-23

## 2024-10-23 PROBLEM — O14.13 SEVERE PRE-ECLAMPSIA IN THIRD TRIMESTER: Status: ACTIVE | Noted: 2024-10-23

## 2024-10-23 LAB
ALBUMIN SERPL-MCNC: 1.8 G/DL (ref 3.5–5)
ALBUMIN SERPL-MCNC: 1.8 G/DL (ref 3.5–5)
ALBUMIN/GLOB SERPL: 0.5 (ref 1.1–2.2)
ALBUMIN/GLOB SERPL: 0.6 (ref 1.1–2.2)
ALP SERPL-CCNC: 129 U/L (ref 45–117)
ALP SERPL-CCNC: 130 U/L (ref 45–117)
ALT SERPL-CCNC: 19 U/L (ref 12–78)
ALT SERPL-CCNC: 19 U/L (ref 12–78)
ANION GAP SERPL CALC-SCNC: 2 MMOL/L (ref 2–12)
ANION GAP SERPL CALC-SCNC: 4 MMOL/L (ref 2–12)
AST SERPL-CCNC: 17 U/L (ref 15–37)
AST SERPL-CCNC: 17 U/L (ref 15–37)
BASOPHILS # BLD: 0 K/UL (ref 0–0.1)
BASOPHILS # BLD: 0 K/UL (ref 0–0.1)
BASOPHILS NFR BLD: 0 % (ref 0–1)
BASOPHILS NFR BLD: 0 % (ref 0–1)
BILIRUB SERPL-MCNC: 0.1 MG/DL (ref 0.2–1)
BILIRUB SERPL-MCNC: 0.2 MG/DL (ref 0.2–1)
BUN SERPL-MCNC: 16 MG/DL (ref 6–20)
BUN SERPL-MCNC: 19 MG/DL (ref 6–20)
BUN/CREAT SERPL: 17 (ref 12–20)
BUN/CREAT SERPL: 25 (ref 12–20)
CALCIUM SERPL-MCNC: 7.6 MG/DL (ref 8.5–10.1)
CALCIUM SERPL-MCNC: 7.6 MG/DL (ref 8.5–10.1)
CHLORIDE SERPL-SCNC: 109 MMOL/L (ref 97–108)
CHLORIDE SERPL-SCNC: 109 MMOL/L (ref 97–108)
CO2 SERPL-SCNC: 26 MMOL/L (ref 21–32)
CO2 SERPL-SCNC: 27 MMOL/L (ref 21–32)
CREAT SERPL-MCNC: 0.77 MG/DL (ref 0.55–1.02)
CREAT SERPL-MCNC: 0.95 MG/DL (ref 0.55–1.02)
DIFFERENTIAL METHOD BLD: ABNORMAL
DIFFERENTIAL METHOD BLD: ABNORMAL
EOSINOPHIL # BLD: 0 K/UL (ref 0–0.4)
EOSINOPHIL # BLD: 0.1 K/UL (ref 0–0.4)
EOSINOPHIL NFR BLD: 0 % (ref 0–7)
EOSINOPHIL NFR BLD: 1 % (ref 0–7)
ERYTHROCYTE [DISTWIDTH] IN BLOOD BY AUTOMATED COUNT: 14.7 % (ref 11.5–14.5)
ERYTHROCYTE [DISTWIDTH] IN BLOOD BY AUTOMATED COUNT: 14.9 % (ref 11.5–14.5)
GLOBULIN SER CALC-MCNC: 2.9 G/DL (ref 2–4)
GLOBULIN SER CALC-MCNC: 3.3 G/DL (ref 2–4)
GLUCOSE BLD STRIP.AUTO-MCNC: 106 MG/DL (ref 65–117)
GLUCOSE BLD STRIP.AUTO-MCNC: 126 MG/DL (ref 65–117)
GLUCOSE BLD STRIP.AUTO-MCNC: 86 MG/DL (ref 65–117)
GLUCOSE BLD STRIP.AUTO-MCNC: 94 MG/DL (ref 65–117)
GLUCOSE SERPL-MCNC: 114 MG/DL (ref 65–100)
GLUCOSE SERPL-MCNC: 87 MG/DL (ref 65–100)
HCT VFR BLD AUTO: 24.5 % (ref 35–47)
HCT VFR BLD AUTO: 25.2 % (ref 35–47)
HGB BLD-MCNC: 8 G/DL (ref 11.5–16)
HGB BLD-MCNC: 8.2 G/DL (ref 11.5–16)
IMM GRANULOCYTES # BLD AUTO: 0.1 K/UL (ref 0–0.04)
IMM GRANULOCYTES # BLD AUTO: 0.1 K/UL (ref 0–0.04)
IMM GRANULOCYTES NFR BLD AUTO: 1 % (ref 0–0.5)
IMM GRANULOCYTES NFR BLD AUTO: 1 % (ref 0–0.5)
LDH SERPL L TO P-CCNC: 204 U/L (ref 81–246)
LYMPHOCYTES # BLD: 2.5 K/UL (ref 0.8–3.5)
LYMPHOCYTES # BLD: 2.5 K/UL (ref 0.8–3.5)
LYMPHOCYTES NFR BLD: 20 % (ref 12–49)
LYMPHOCYTES NFR BLD: 21 % (ref 12–49)
MCH RBC QN AUTO: 28.5 PG (ref 26–34)
MCH RBC QN AUTO: 28.6 PG (ref 26–34)
MCHC RBC AUTO-ENTMCNC: 32.5 G/DL (ref 30–36.5)
MCHC RBC AUTO-ENTMCNC: 32.7 G/DL (ref 30–36.5)
MCV RBC AUTO: 87.2 FL (ref 80–99)
MCV RBC AUTO: 87.8 FL (ref 80–99)
MONOCYTES # BLD: 0.7 K/UL (ref 0–1)
MONOCYTES # BLD: 0.7 K/UL (ref 0–1)
MONOCYTES NFR BLD: 6 % (ref 5–13)
MONOCYTES NFR BLD: 6 % (ref 5–13)
NEUTS SEG # BLD: 8.5 K/UL (ref 1.8–8)
NEUTS SEG # BLD: 9.3 K/UL (ref 1.8–8)
NEUTS SEG NFR BLD: 72 % (ref 32–75)
NEUTS SEG NFR BLD: 72 % (ref 32–75)
NRBC # BLD: 0 K/UL (ref 0–0.01)
NRBC # BLD: 0.02 K/UL (ref 0–0.01)
NRBC BLD-RTO: 0 PER 100 WBC
NRBC BLD-RTO: 0.2 PER 100 WBC
PLATELET # BLD AUTO: 232 K/UL (ref 150–400)
PLATELET # BLD AUTO: 236 K/UL (ref 150–400)
PMV BLD AUTO: 11.6 FL (ref 8.9–12.9)
PMV BLD AUTO: 12 FL (ref 8.9–12.9)
POTASSIUM SERPL-SCNC: 4.4 MMOL/L (ref 3.5–5.1)
POTASSIUM SERPL-SCNC: 5.1 MMOL/L (ref 3.5–5.1)
PROT SERPL-MCNC: 4.7 G/DL (ref 6.4–8.2)
PROT SERPL-MCNC: 5.1 G/DL (ref 6.4–8.2)
RBC # BLD AUTO: 2.81 M/UL (ref 3.8–5.2)
RBC # BLD AUTO: 2.87 M/UL (ref 3.8–5.2)
SERVICE CMNT-IMP: ABNORMAL
SERVICE CMNT-IMP: NORMAL
SODIUM SERPL-SCNC: 138 MMOL/L (ref 136–145)
SODIUM SERPL-SCNC: 139 MMOL/L (ref 136–145)
WBC # BLD AUTO: 12 K/UL (ref 3.6–11)
WBC # BLD AUTO: 12.8 K/UL (ref 3.6–11)

## 2024-10-23 PROCEDURE — 6370000000 HC RX 637 (ALT 250 FOR IP): Performed by: STUDENT IN AN ORGANIZED HEALTH CARE EDUCATION/TRAINING PROGRAM

## 2024-10-23 PROCEDURE — 6370000000 HC RX 637 (ALT 250 FOR IP)

## 2024-10-23 PROCEDURE — 82962 GLUCOSE BLOOD TEST: CPT

## 2024-10-23 PROCEDURE — 36415 COLL VENOUS BLD VENIPUNCTURE: CPT

## 2024-10-23 PROCEDURE — 85025 COMPLETE CBC W/AUTO DIFF WBC: CPT

## 2024-10-23 PROCEDURE — 80053 COMPREHEN METABOLIC PANEL: CPT

## 2024-10-23 PROCEDURE — 83615 LACTATE (LD) (LDH) ENZYME: CPT

## 2024-10-23 PROCEDURE — 1120000000 HC RM PRIVATE OB

## 2024-10-23 RX ORDER — NIFEDIPINE 30 MG/1
30 TABLET, EXTENDED RELEASE ORAL 2 TIMES DAILY
Status: DISCONTINUED | OUTPATIENT
Start: 2024-10-23 | End: 2024-10-25 | Stop reason: HOSPADM

## 2024-10-23 RX ADMIN — OXYCODONE 10 MG: 5 TABLET ORAL at 23:25

## 2024-10-23 RX ADMIN — DOCUSATE SODIUM 100 MG: 100 CAPSULE, LIQUID FILLED ORAL at 20:56

## 2024-10-23 RX ADMIN — OXYCODONE 10 MG: 5 TABLET ORAL at 17:43

## 2024-10-23 RX ADMIN — OXYCODONE 10 MG: 5 TABLET ORAL at 10:28

## 2024-10-23 RX ADMIN — IBUPROFEN 800 MG: 800 TABLET, FILM COATED ORAL at 06:27

## 2024-10-23 RX ADMIN — NIFEDIPINE 30 MG: 30 TABLET, FILM COATED, EXTENDED RELEASE ORAL at 20:56

## 2024-10-23 RX ADMIN — BUTALBITAL, ACETAMINOPHEN, AND CAFFEINE 1 TABLET: 325; 50; 40 TABLET ORAL at 12:36

## 2024-10-23 RX ADMIN — ACETAMINOPHEN 1000 MG: 500 TABLET ORAL at 20:55

## 2024-10-23 RX ADMIN — BUTALBITAL, ACETAMINOPHEN, AND CAFFEINE 1 TABLET: 325; 50; 40 TABLET ORAL at 08:05

## 2024-10-23 RX ADMIN — ACETAMINOPHEN 1000 MG: 500 TABLET ORAL at 04:29

## 2024-10-23 RX ADMIN — ACETAMINOPHEN 1000 MG: 500 TABLET ORAL at 12:33

## 2024-10-23 RX ADMIN — IBUPROFEN 800 MG: 800 TABLET, FILM COATED ORAL at 14:06

## 2024-10-23 RX ADMIN — IBUPROFEN 800 MG: 800 TABLET, FILM COATED ORAL at 23:25

## 2024-10-23 ASSESSMENT — PAIN SCALES - GENERAL
PAINLEVEL_OUTOF10: 7
PAINLEVEL_OUTOF10: 6
PAINLEVEL_OUTOF10: 4
PAINLEVEL_OUTOF10: 7

## 2024-10-23 ASSESSMENT — PAIN DESCRIPTION - ORIENTATION
ORIENTATION: LOWER
ORIENTATION: LOWER
ORIENTATION: ANTERIOR
ORIENTATION: LOWER
ORIENTATION: LOWER;ANTERIOR
ORIENTATION: LOWER;MID
ORIENTATION: LOWER
ORIENTATION: LOWER;MID
ORIENTATION: LOWER;MID;ANTERIOR

## 2024-10-23 ASSESSMENT — PAIN DESCRIPTION - DESCRIPTORS
DESCRIPTORS: SORE

## 2024-10-23 ASSESSMENT — PAIN DESCRIPTION - LOCATION
LOCATION: INCISION
LOCATION: HEAD
LOCATION: INCISION
LOCATION: INCISION
LOCATION: HEAD
LOCATION: ABDOMEN;INCISION
LOCATION: INCISION

## 2024-10-23 ASSESSMENT — PAIN - FUNCTIONAL ASSESSMENT
PAIN_FUNCTIONAL_ASSESSMENT: ACTIVITIES ARE NOT PREVENTED

## 2024-10-23 NOTE — LACTATION NOTE
This note was copied from a baby's chart.  Baby in NICU born at 32 weeks.  sets Mom up with breast pump and measures for appropriate flange size. Mom did not breast feed or pump for her other child. Mom does not have a breast pump; LC will discuss with case management to assist in ordering a pump. Preparation and storage of breast milk discussed. Breastfeeding booklet provided. All questions answered.    Infant admitted to NICU.  Pt will successfully establish breast milk supply by pumping with a hospital grade pump every 2-3 hours for approximately 20 minutes/8-10 x day.  To maximize milk production mom taught to incorporate breast massage before and hand expression after each pumping session.  All expressed breast milk (EBM) will be provided for infant(s) use.  The value of skin to skin bonding emphasized.  The breast will be offered as baby is ready; with the goal of eventual transition to breastfeeding. Importance of maintaining milk supply through pumping emphasized as infant(s) learns to nurse.      1345-  rounds again to give MOB information on renting a breast pump. Mom states she forgot she did order a breast pump and has one at home.

## 2024-10-23 NOTE — PROGRESS NOTES
1600 Patient admitted to MIU c/o HA 6/10 feeling like pressure on top and side of head. Patient reports dizziness when transferring from wheelchair to the bed. No vision changes and BP WNL.    LTCS incision found to be oozing on right side. With charge RN Kelli Carranza, removed right two steri-strips and packed with lateral steri-strips, applied abdominal pad and reinforced with adhesive bandage.     Order for sliding scale insulin lispro and blood sugars to be checked before meals and at bedtime. Transferring L&D nurse states we are to check 2 hours postprandial and fasting blood sugars. L&D nurse to clarify with OB residents on this.     1700 L&D nurse called to confirm 2hr postprandial checks and fasting blood sugar along with insulin lispro sliding scale to be given accordingly. This RN calls pharmacy to question if insulin lispro can be given 2hr postprandial. Pharmacy urges this RN to clarify with OB residents to prevent already declining blood sugar from going lower 2hr postprandial.    This RN spoke with Dr. Fournier. Told Dr. Fournier about oozing incision, HA, dizziness, and to clarify insulin orders and was advised to look for further orders. Dr. Fournier to come see the patient in 15 minutes and TORB to administer available PRN fioricet for GHOTRA.     1715 Dr. Fournier bedside with this RN examining patient. Dr. Johnson to see incision when available. NPH 6 units orders placed and lispro discontinued at this time.    1730 Dr. Fournier and Dr. Johnson examining patient's incision.    1745 Dr. Fournier VORB no sutures needed, dermabond to be applied by this RN on the right side, and abdominal gauze pad to be used to catch further drainage. Nursing is to contact OB residents if drainage is concerning overnight.     1800 Patient reports improvement of HA from 7/10 to now 5/10.     1815 Dermabond applied to incision by this RN and June Mosqueda RN, and new abdominal gauze placed.

## 2024-10-23 NOTE — PROGRESS NOTES
[address] [place] [address]   Office [phone], Fax [phone]    Postpartum Progress Note    Subjective: Pt comfortable. Reporting dizziness with walking to the bathroom and HA when walking. Voiding spontaneously without kirk, no BM.    Tolerating po, denies N/V.  Reports no blurry vision/scotoma/RUQ pain.  No CP/SOB.     Objective: Patient Vitals for the past 4 hrs:   Temp Pulse Resp BP SpO2   10/23/24 0604 98.1 °F (36.7 °C) 69 16 110/76 98 %           Intake/Output Summary (Last 24 hours) at 10/23/2024 0910  Last data filed at 10/23/2024 0400  Gross per 24 hour   Intake 0 ml   Output 5742 ml   Net -5742 ml     Last 24 hrs: 2.4 cc/kg/hr urine output    Gen: AAO x 3  CV: RRR no m/r/g  Resp: CTAB  Abdomen: gravid but soft, no tenderness in RUQ or fundus, appropriately tender at incision site  Ext: 2+ DTRs, trace + BLE edema stable from prior exam  Skin: Minimal serosanguinous fluid on ABD pad over Pfannenstiel, non erythematous, nonpurulent exudates    Assessment and Plan:    Rochelle Giraldo is a 28 y.o.  s/p LTCS for severe PreE at 32w1d. Pregnancy was complicated by  A1GDM, prior history of elevated BP in prior pregnancy, history of late  delivery. Delivery uncomplicated.  cc, post-op Hgb 9.6 from 11.2. Continue to monitor inpatient for below problems.    POD#2 s/p LTCS for severe PreE  Mild HA and dizziness with ambulation. Patient has gotten up twice in 12 hours for bathroom. BP OVN lower limit of normal, this /76. BPs at goal 110s-120s. Pt sill with appropriate UOP > 0.5 cc/kg/hr, s/p lasix 20 mg IV x 2.  Continue routine post-op care and maternal education  Dizziness: likely 2/2 PRES, fluid shifts  Orthostatic vitals normal  Hold AM nifedipine 30 mg XR for low-normal BPs  Encourage po hydration  HA: Likely 2/2 PRES, may take several weeks for intracranial pressure to completely resolve  Fioricet, pt reports HA most improved with fioricet  If  refractory, Benadryl + Reglan  LTCS pain: controlled   mg q8h prn  Oxycodone 5 mg q4h prn   S/p Mag, stopped @ 0800 on 10/22/24  S/p kirk removal, voiding spontaneously  Incision drainage  ABD pad for serosanginous fluid drainage  May be removed 24 hours post-op     PRES 2/2 Severe PreE  MRI brain showing \"Trace FLAIR signal abnormality at the posterior superior right and left parietal lobes\" c/w/ PRES. Patient AAOx4, non encephalopathic at this time. Notes some dizziness with sitting upright.   Continue nifedipine 30 mg XR BID  Monitor vitals q4    Anemia  CBC notable for down trending Hgb 8.0<9.6<11.2. BL 12.0. No hx ABRAHAM during pregnancy. Possibly dilutional given all cell lines decreased from day prior. LDH previously elevated  Repeat LDH wnl   Repeat CBC this afternoon    A1GDM  Elevated BG requiring SSI, NPH s/p betamethasone x2. BG within goal now, fasting AM 96.  S/p 6 NPH @ 1800 yesterday evening  Hold AM NPH  Trend POC BG 2 hr pp, fasting AM  Consider PM NPH if elevated PP BG       Patient discussed with Dr. Costello.     Rylee Fournier MD  Family Medicine Resident

## 2024-10-24 ENCOUNTER — APPOINTMENT (OUTPATIENT)
Facility: HOSPITAL | Age: 29
DRG: 540 | End: 2024-10-24
Payer: MEDICAID

## 2024-10-24 LAB
ALBUMIN SERPL-MCNC: 2 G/DL (ref 3.5–5)
ALBUMIN/GLOB SERPL: 0.6 (ref 1.1–2.2)
ALP SERPL-CCNC: 130 U/L (ref 45–117)
ALT SERPL-CCNC: 23 U/L (ref 12–78)
ANION GAP SERPL CALC-SCNC: 5 MMOL/L (ref 2–12)
AST SERPL-CCNC: 21 U/L (ref 15–37)
BILIRUB SERPL-MCNC: 0.1 MG/DL (ref 0.2–1)
BUN SERPL-MCNC: 18 MG/DL (ref 6–20)
BUN/CREAT SERPL: 29 (ref 12–20)
CALCIUM SERPL-MCNC: 8.4 MG/DL (ref 8.5–10.1)
CHLORIDE SERPL-SCNC: 106 MMOL/L (ref 97–108)
CO2 SERPL-SCNC: 26 MMOL/L (ref 21–32)
CREAT SERPL-MCNC: 0.63 MG/DL (ref 0.55–1.02)
ERYTHROCYTE [DISTWIDTH] IN BLOOD BY AUTOMATED COUNT: 14.9 % (ref 11.5–14.5)
GLOBULIN SER CALC-MCNC: 3.1 G/DL (ref 2–4)
GLUCOSE BLD STRIP.AUTO-MCNC: 83 MG/DL (ref 65–117)
GLUCOSE SERPL-MCNC: 80 MG/DL (ref 65–100)
HCT VFR BLD AUTO: 24.7 % (ref 35–47)
HGB BLD-MCNC: 8 G/DL (ref 11.5–16)
MCH RBC QN AUTO: 28.4 PG (ref 26–34)
MCHC RBC AUTO-ENTMCNC: 32.4 G/DL (ref 30–36.5)
MCV RBC AUTO: 87.6 FL (ref 80–99)
NRBC # BLD: 0.04 K/UL (ref 0–0.01)
NRBC BLD-RTO: 0.3 PER 100 WBC
PLATELET # BLD AUTO: 220 K/UL (ref 150–400)
PMV BLD AUTO: 11.1 FL (ref 8.9–12.9)
POTASSIUM SERPL-SCNC: 4.4 MMOL/L (ref 3.5–5.1)
PROT SERPL-MCNC: 5.1 G/DL (ref 6.4–8.2)
RBC # BLD AUTO: 2.82 M/UL (ref 3.8–5.2)
SERVICE CMNT-IMP: NORMAL
SODIUM SERPL-SCNC: 137 MMOL/L (ref 136–145)
WBC # BLD AUTO: 12.9 K/UL (ref 3.6–11)

## 2024-10-24 PROCEDURE — 85027 COMPLETE CBC AUTOMATED: CPT

## 2024-10-24 PROCEDURE — 1120000000 HC RM PRIVATE OB

## 2024-10-24 PROCEDURE — 71045 X-RAY EXAM CHEST 1 VIEW: CPT

## 2024-10-24 PROCEDURE — 80053 COMPREHEN METABOLIC PANEL: CPT

## 2024-10-24 PROCEDURE — 6370000000 HC RX 637 (ALT 250 FOR IP): Performed by: STUDENT IN AN ORGANIZED HEALTH CARE EDUCATION/TRAINING PROGRAM

## 2024-10-24 PROCEDURE — 82962 GLUCOSE BLOOD TEST: CPT

## 2024-10-24 PROCEDURE — 6370000000 HC RX 637 (ALT 250 FOR IP)

## 2024-10-24 PROCEDURE — 93005 ELECTROCARDIOGRAM TRACING: CPT | Performed by: STUDENT IN AN ORGANIZED HEALTH CARE EDUCATION/TRAINING PROGRAM

## 2024-10-24 PROCEDURE — 36415 COLL VENOUS BLD VENIPUNCTURE: CPT

## 2024-10-24 RX ADMIN — ACETAMINOPHEN 1000 MG: 500 TABLET ORAL at 14:42

## 2024-10-24 RX ADMIN — IBUPROFEN 800 MG: 800 TABLET, FILM COATED ORAL at 19:39

## 2024-10-24 RX ADMIN — IBUPROFEN 800 MG: 800 TABLET, FILM COATED ORAL at 08:10

## 2024-10-24 RX ADMIN — DOCUSATE SODIUM 100 MG: 100 CAPSULE, LIQUID FILLED ORAL at 08:08

## 2024-10-24 RX ADMIN — NIFEDIPINE 30 MG: 30 TABLET, FILM COATED, EXTENDED RELEASE ORAL at 08:51

## 2024-10-24 RX ADMIN — OXYCODONE 5 MG: 5 TABLET ORAL at 08:19

## 2024-10-24 RX ADMIN — BUTALBITAL, ACETAMINOPHEN, AND CAFFEINE 1 TABLET: 325; 50; 40 TABLET ORAL at 02:31

## 2024-10-24 RX ADMIN — Medication 1 TABLET: at 08:08

## 2024-10-24 ASSESSMENT — PAIN SCALES - GENERAL
PAINLEVEL_OUTOF10: 6
PAINLEVEL_OUTOF10: 6
PAINLEVEL_OUTOF10: 2
PAINLEVEL_OUTOF10: 8
PAINLEVEL_OUTOF10: 4

## 2024-10-24 ASSESSMENT — PAIN DESCRIPTION - DESCRIPTORS
DESCRIPTORS: SORE
DESCRIPTORS: SORE
DESCRIPTORS: CRAMPING

## 2024-10-24 ASSESSMENT — PAIN DESCRIPTION - LOCATION
LOCATION: ABDOMEN
LOCATION: HEAD

## 2024-10-24 ASSESSMENT — PAIN DESCRIPTION - ORIENTATION
ORIENTATION: LOWER
ORIENTATION: LOWER
ORIENTATION: MID

## 2024-10-24 NOTE — LACTATION NOTE
This note was copied from a baby's chart.  Mom has been pumping every 3 hours throughout the night. MOB states she sent 4 syringes of 3ML last night.  provides bigger syringes and bottles. Preparation and storage of breast milk explained. Signs of mastitis and treatment of engorgement explained. MOB has a breast pump at home. All questions answered.    Infant admitted to NICU.  Pt will successfully establish breast milk supply by pumping with a hospital grade pump every 2-3 hours for approximately 20 minutes/8-10 x day.  To maximize milk production mom taught to incorporate breast massage before and hand expression after each pumping session.  All expressed breast milk (EBM) will be provided for infant(s) use.  The value of skin to skin bonding emphasized.  The breast will be offered as baby is ready; with the goal of eventual transition to breastfeeding. Importance of maintaining milk supply through pumping emphasized as infant(s) learns to nurse.

## 2024-10-24 NOTE — PROGRESS NOTES
0222: RN in to obtain vitals at this time; vitals stable. Pt requesting Fioricet for c/o headache.    0231: Fioricet given per orders. Pt c/o shortness of breath at this time. Vitals re-taken and remain stable. Pt denies any other symptoms.     0300: Contacted [facility] at this time    0310: Resident from [facility] assessing pt at this time. Order for portable chest x-ray.

## 2024-10-24 NOTE — PROGRESS NOTES
0820: Pt complains on chest pressure/ pain , vital signs stable. MD residents notified . Verbal order for ECG.   0830: Residents at beside for evaluation.   Patient given incentive spirometer and instructed on how to use  Will continue to monitor.  MD notified of EPDS of 11

## 2024-10-24 NOTE — PROGRESS NOTES
[address] [id number]   Office [phone], Fax [phone]    Post-Partum Day Number 3 Progress Note    Patient seen and examined this morning. Incisional pain is well controlled, otherwise denying pain.  Reports episode of worsened HA ovn resolved with fioricet accompanied by chest pressure which is now resolved. Lochia nml.  Tolerating regular diet.  Ambulating.  Voiding without difficulty.  + flatus.  No BM.      At 0830 pt again reports chest pressure, I went to bedside to reassess at which time she endorses substernal pressure that did not radiate and associated with feeling of inability to take a full breath. Endorsed associated anxiety and feeling of heart racing. Denies HA, visual disturbance, abdominal pain, LE pain/edema. Bedside EKG with nsr. VS wnl. Exam at that time unchanged with clear lung sounds, normal heart sounds, 2+ pulses, and chest pressure was reproducible on palpation. Suspect anxiety/panic yasmeen iso current stressors and as there was no family at bedside. Reassured pt that vitals, exam, EKG and imaging are all wnl and that she should let us know if symptoms reoccur. Believe she will benefit from seeing her baby again today. Also provided incentive spirometer to help lung inflation. All questions answered to pt satisfaction and symptoms had resolved by the time I exited the room.     Vitals:    Vitals:    10/24/24 0600   BP: 118/86   Pulse: 78   Resp: 16   Temp: 97.7 °F (36.5 °C)   SpO2: 98%      Temp (24hrs), Av.8 °F (36.6 °C), Min:97.3 °F (36.3 °C), Max:98.7 °F (37.1 °C)      Exam:  Patient without distress.               CTAB, no w/r/r/c.               RRR, +S1 and S2, no m/r/g.    Abdomen soft, fundus firm at level of umbilicus, nontender. Incision CDI with minimal serous drainage at right wound edge                Lower extremities:  No edema. No palpable cords or tenderness. 2+ DTRs    Lab/Data Review:  Recent Results (from the past 12 hour(s))   POCT

## 2024-10-25 VITALS
DIASTOLIC BLOOD PRESSURE: 98 MMHG | SYSTOLIC BLOOD PRESSURE: 136 MMHG | RESPIRATION RATE: 18 BRPM | HEIGHT: 64 IN | OXYGEN SATURATION: 100 % | TEMPERATURE: 98.1 F | BODY MASS INDEX: 38.17 KG/M2 | WEIGHT: 223.6 LBS | HEART RATE: 73 BPM

## 2024-10-25 PROCEDURE — 6370000000 HC RX 637 (ALT 250 FOR IP)

## 2024-10-25 PROCEDURE — 94761 N-INVAS EAR/PLS OXIMETRY MLT: CPT

## 2024-10-25 PROCEDURE — 6370000000 HC RX 637 (ALT 250 FOR IP): Performed by: STUDENT IN AN ORGANIZED HEALTH CARE EDUCATION/TRAINING PROGRAM

## 2024-10-25 PROCEDURE — 99238 HOSP IP/OBS DSCHRG MGMT 30/<: CPT | Performed by: OBSTETRICS & GYNECOLOGY

## 2024-10-25 RX ORDER — ADHESIVE BANDAGE 3/4"
1 BANDAGE TOPICAL DAILY
Qty: 1 EACH | Refills: 0 | Status: SHIPPED | OUTPATIENT
Start: 2024-10-25

## 2024-10-25 RX ORDER — HYDROCODONE BITARTRATE AND ACETAMINOPHEN 5; 325 MG/1; MG/1
1 TABLET ORAL EVERY 6 HOURS PRN
Qty: 20 TABLET | Refills: 0 | Status: SHIPPED | OUTPATIENT
Start: 2024-10-25 | End: 2024-10-30

## 2024-10-25 RX ORDER — NIFEDIPINE 30 MG/1
30 TABLET, EXTENDED RELEASE ORAL DAILY
Qty: 30 TABLET | Refills: 0 | Status: SHIPPED | OUTPATIENT
Start: 2024-10-25

## 2024-10-25 RX ORDER — PSEUDOEPHEDRINE HCL 30 MG
100 TABLET ORAL DAILY
Qty: 20 CAPSULE | Refills: 0 | Status: SHIPPED | OUTPATIENT
Start: 2024-10-25

## 2024-10-25 RX ORDER — IBUPROFEN 800 MG/1
800 TABLET, FILM COATED ORAL EVERY 8 HOURS PRN
Qty: 30 TABLET | Refills: 0 | Status: SHIPPED | OUTPATIENT
Start: 2024-10-25

## 2024-10-25 RX ADMIN — DOCUSATE SODIUM 100 MG: 100 CAPSULE, LIQUID FILLED ORAL at 09:37

## 2024-10-25 RX ADMIN — NIFEDIPINE 30 MG: 30 TABLET, FILM COATED, EXTENDED RELEASE ORAL at 09:37

## 2024-10-25 RX ADMIN — IBUPROFEN 800 MG: 800 TABLET, FILM COATED ORAL at 02:45

## 2024-10-25 RX ADMIN — BUTALBITAL, ACETAMINOPHEN, AND CAFFEINE 1 TABLET: 325; 50; 40 TABLET ORAL at 13:01

## 2024-10-25 RX ADMIN — ACETAMINOPHEN 1000 MG: 500 TABLET ORAL at 06:15

## 2024-10-25 RX ADMIN — OXYCODONE 5 MG: 5 TABLET ORAL at 09:37

## 2024-10-25 RX ADMIN — ACETAMINOPHEN 1000 MG: 500 TABLET ORAL at 00:01

## 2024-10-25 RX ADMIN — Medication 1 TABLET: at 09:37

## 2024-10-25 RX ADMIN — IBUPROFEN 800 MG: 800 TABLET, FILM COATED ORAL at 09:36

## 2024-10-25 ASSESSMENT — PAIN DESCRIPTION - DESCRIPTORS
DESCRIPTORS: ACHING;CRAMPING;SORE
DESCRIPTORS: CRAMPING
DESCRIPTORS: ACHING
DESCRIPTORS: CRAMPING
DESCRIPTORS: CRAMPING

## 2024-10-25 ASSESSMENT — PAIN DESCRIPTION - LOCATION
LOCATION: ABDOMEN
LOCATION: HEAD

## 2024-10-25 ASSESSMENT — PAIN SCALES - GENERAL
PAINLEVEL_OUTOF10: 3
PAINLEVEL_OUTOF10: 7
PAINLEVEL_OUTOF10: 6
PAINLEVEL_OUTOF10: 6
PAINLEVEL_OUTOF10: 7

## 2024-10-25 ASSESSMENT — PAIN DESCRIPTION - ORIENTATION
ORIENTATION: MID
ORIENTATION: MID
ORIENTATION: LOWER
ORIENTATION: MID;LOWER

## 2024-10-25 NOTE — DISCHARGE INSTRUCTIONS
Patient Discharge Instructions    Rochelle Giraldo / [id number] : 1995    Admitted 10/20/2024 Discharged: 10/25/2024       Por favor tenga chepe documento presente en linares leanne de seguimiento con linares médico primario.    Primary care provider:  Kamilla Blanc DO    Discharging provider:  Fernanda Joshi MD  - Family Medicine Resident  Bernadette Mirza MD -  OBGYN attending          Diágnostico de Admisión:  Pregnancy with 32 completed weeks gestation [Z3A.32]      Current Discharge Medication List             Details   folic acid (FOLVITE) 1 MG tablet Take 1 tablet by mouth daily      Prenatal Vit-Fe Fumarate-FA (PRENATAL VITAMINS PO) Take by mouth      aspirin 81 MG EC tablet Take 1 tablet by mouth daily  Qty: 90 tablet, Refills: 0    Associated Diagnoses: Supervision of low-risk pregnancy, second trimester      blood glucose monitor kit and supplies Dispense sufficient amount for indicated testing frequency plus additional to accommodate PRN testing needs. Dispense all needed supplies to include: monitor, strips, lancing device, lancets, control solutions, alcohol swabs.  Qty: 1 kit, Refills: 0    Comments: Brand per patient preference. May round up to next available package size.  Associated Diagnoses: Gestational diabetes mellitus (GDM), antepartum, gestational diabetes method of control unspecified      Lancets MISC 1 each by Does not apply route daily  Qty: 100 each, Refills: 5    Associated Diagnoses: Gestational diabetes mellitus (GDM), antepartum, gestational diabetes method of control unspecified      blood glucose monitor strips Test 4 times a day & as needed for symptoms of irregular blood glucose. Dispense sufficient amount for indicated testing frequency plus additional to accommodate PRN testing needs.  Qty: 90 strip, Refills: 1    Comments: (1) Identify specific brand and product.  (2) Include specific quantity.  (3) Include frequency.  Associated Diagnoses: Gestational diabetes

## 2024-10-25 NOTE — DISCHARGE SUMMARY
Obstetrical Discharge Summary     Name: Rochelle Giraldo MRN: [id number]  SSN: [id number]    YOB: 1995  Age: 28 y.o.  Sex: female      Admit Date: 10/20/2024    Discharge Date: 10/25/2024     Admitting Physician: Eddie Johnson MD     Attending Physician:  Bernadette Mirza MD    Admission Diagnoses: Pregnancy with 32 completed weeks gestation [Z3A.32]    Discharge Diagnoses:   Information for the patient's :  Beatriz Giraldo, Female Rochelle [[id number]]   @[id number]@     Additional Diagnoses:    Pre-eclampsia with severe features  PRES  Pulmonary edema  A1GDM      Immunization(s):   Immunization History   Administered Date(s) Administered    Hep B, ENGERIX-B, (age 20y+), IM, 1mL 10/07/2024    TDaP, ADACEL (age 10y-64y), BOOSTRIX (age 10y+), IM, 0.5mL 2024        Hospital Course:   Patient is a 28 y.o.  s/p LTCS at 32 weeks 1 days.  Initially presented with HA, vision changes, chest pain, dyspnea, and edema iso /114. Pregnancy otherwise complicated by A1GDM, maternal obesity, ABRAHAM. BP controlled inpatient on nifedipine XR 30 mg BID without further elevations. Found to have pulmonary edema on CXR improved s/p diuresis with lasix. Also found to have PRES on CT and MRI. S/p 2 doses betamethasone for lung maturity, pt's BG was elevated so she was treated with NPH 10u BID which was discontinued on POD2. Delivered  live female infant by LTCS with QBL 1000. During post-op course pt twice reported substernal chest pressure during which episodes CXR was improved from prior and EKG was normal, likely due to panic attacks iso recent stressors, these episodes improved with social support and reassurance, pt declines medication.      On day of discharge patient reported minimal lochia, well controlled pain, and no other complaints.  Discharged with pain regimen and bowel regimen.  Advised to continue prenatal vitamins, iron, and nifedipine XR 30 mg qd.  Follow up with

## 2024-10-27 LAB
EKG ATRIAL RATE: 73 BPM
EKG DIAGNOSIS: NORMAL
EKG P AXIS: 29 DEGREES
EKG P-R INTERVAL: 124 MS
EKG Q-T INTERVAL: 404 MS
EKG QRS DURATION: 72 MS
EKG QTC CALCULATION (BAZETT): 445 MS
EKG R AXIS: 55 DEGREES
EKG T AXIS: 23 DEGREES
EKG VENTRICULAR RATE: 73 BPM

## 2024-10-27 PROCEDURE — 93010 ELECTROCARDIOGRAM REPORT: CPT | Performed by: INTERNAL MEDICINE

## 2024-10-29 NOTE — PROGRESS NOTES
[address] [id number]   Office [phone], Fax [phone]    Postpartum Note    28 y.o.  female for 1 week follow up status post  LTCS at 32 weeks 1 days.  presenting for postpartum visit.  Patient doing well post-partum without significant complaint.      Pregnancy otherwise complicated by A1GDM, maternal obesity, ABRAHAM. BP controlled inpatient on nifedipine XR 30 mg BID without further elevations. Found to have pulmonary edema on CXR improved s/p diuresis with lasix. Also found to have PRES on CT and MRI. S/p 2 doses betamethasone for lung maturity, pt's BG was elevated so she was treated with NPH 10u BID which was discontinued on POD2. Delivered  live female infant by LTCS with QBL 1000. During post-op course pt twice reported substernal chest pressure during which episodes CXR was improved from prior and EKG was normal, likely due to panic attacks iso recent stressors, these episodes improved with social support and reassurance, pt declines medication.    Hgb on day of discharge 8.0. Hgb BL 12.       Reports today BP is well controlled at home, always 110s systolic. No further episodes of CP. Has had a headache previously this week that improved with motrin.   Also has CS site Pain:-burning pain. no drainage, no fevers. Improves with motrin.     Lochia: normal  Pain: controlled  Baby: doing well, is still in the NICU  Sexual activity: not   Plan for contraception: unsure, will think it over.   Symptoms of depression: no  Breast/bottle: both   Support from FOB/family: yes        Vitals:  LMP 2024     Exam:  Patient without distress.    Abdomen soft, fundus firm at level of umbilicus, nontender.               Lower extremities:  No edema. No palpable cords or tenderness. No swelling of BLLE. CS incision with steri strips in place. No erythema or drainage.    Neuro: pupils PERRLA. No focal deficits.          Assessment and Plan:    Rochelle Giraldo is a

## 2024-11-01 ENCOUNTER — OFFICE VISIT (OUTPATIENT)
Age: 29
End: 2024-11-01

## 2024-11-01 VITALS
HEART RATE: 79 BPM | RESPIRATION RATE: 18 BRPM | SYSTOLIC BLOOD PRESSURE: 114 MMHG | DIASTOLIC BLOOD PRESSURE: 77 MMHG | HEIGHT: 64 IN | TEMPERATURE: 98 F | OXYGEN SATURATION: 98 % | WEIGHT: 190 LBS | BODY MASS INDEX: 32.44 KG/M2

## 2024-11-01 DIAGNOSIS — O14.93 PRE-ECLAMPSIA IN THIRD TRIMESTER: ICD-10-CM

## 2024-11-01 ASSESSMENT — PATIENT HEALTH QUESTIONNAIRE - PHQ9
SUM OF ALL RESPONSES TO PHQ QUESTIONS 1-9: 0
1. LITTLE INTEREST OR PLEASURE IN DOING THINGS: NOT AT ALL
2. FEELING DOWN, DEPRESSED OR HOPELESS: NOT AT ALL
SUM OF ALL RESPONSES TO PHQ9 QUESTIONS 1 & 2: 0

## 2024-11-01 NOTE — PROGRESS NOTES
Room 20    Patient is accompanied by . I have received verbal consent from Rochelle Giraldo to discuss any/all medical information while they are present in the room.    Identified pt with two pt identifiers(name and ). Reviewed record in preparation for visit and have obtained necessary documentation.  Chief Complaint   Patient presents with    Postpartum Care      -  area where glue is , is very painful     Health Maintenance Due   Topic    Pneumococcal 0-64 years Vaccine (1 of 2 - PCV)    A1C test (Diabetic or Prediabetic)     Varicella vaccine (1 of 2 - 13+ 2-dose series)    HIV screen     Pap smear     Flu vaccine (1)    COVID-19 Vaccine ( season)       Vitals:    24 0935   BP: 114/77   Site: Left Upper Arm   Position: Sitting   Cuff Size: Large Adult   Pulse: 79   Resp: 18   Temp: 98 °F (36.7 °C)   TempSrc: Temporal   SpO2: 98%   Weight: 86.2 kg (190 lb)   Height: 1.626 m (5' 4\")       Social Determinants Of Health:       SDOH screening not required at visit.  Resources Declined.   See AVS for attached resources, if requested.    Coordination of Care Questionnaire:       \"Have you been to the ER, urgent care clinic since your last visit?  Hospitalized since your last visit?\"       delivery delivered                “Have you seen or consulted any other health care providers outside of [facility] since your last visit?”    NO     “Have you had a pap smear?”    NO    No cervical cancer screening on file             Click Here for Release of Records Request

## 2024-11-11 NOTE — PROGRESS NOTES
[address] [place] [address]   Office [phone], Fax [phone]    Postpartum Note    29 y.o.  female for 3 week BP follow up status post  LTCS at 32 weeks 1 days.  presenting for postpartum visit.  Patient doing well post-partum without significant complaint.      Pregnancy otherwise complicated by A1GDM, maternal obesity, ABRAHAM, severe Pre E with development of pulmonary edema and PRES on CT and MRI. BG was elevated so she was treated with NPH 10u BID which was discontinued on POD2. Delivered  live female infant by LTCS with QBL 1000. During post-op course pt twice reported substernal chest pressure during which episodes CXR was improved from prior and EKG was normal, likely due to panic attacks iso recent stressors, these episodes improved with social support and reassurance, pt declines medication.    Hgb on day of discharge 8.0. Hgb BL 12.       Reports today BP is well controlled at home, always 110-120s systolic. No further episodes of CP.    Continues to have HA, almost daily. Resolves with tylenol.     Lochia: normal  Pain: controlled  Baby: doing well, is still in the NICU- discharging today!!  Sexual activity: not yet  Plan for contraception: unsure, open to using something once she reviews the list of resources given to her today.   Symptoms of depression: no  Breast/bottle: bottle feeding   Support from FOB/family: yes        Vitals:  /72 (Site: Left Upper Arm, Position: Sitting, Cuff Size: Large Adult)   Pulse 83   Temp 98 °F (36.7 °C) (Temporal)   Resp 18   Ht 1.626 m (5' 4\")   Wt 86.2 kg (190 lb)   LMP 10/21/2024   SpO2 97%   Breastfeeding No   BMI 32.61 kg/m²     Exam:  Patient without distress.    Abdomen soft, fundus firm at level of umbilicus, nontender.               Lower extremities:  No edema. No palpable cords or tenderness. No swelling of BLLE. CS incision with steri strips in place-pt declines removal. No erythema or drainage.    Neuro:

## 2024-11-15 ENCOUNTER — OFFICE VISIT (OUTPATIENT)
Age: 29
End: 2024-11-15

## 2024-11-15 VITALS
RESPIRATION RATE: 18 BRPM | DIASTOLIC BLOOD PRESSURE: 72 MMHG | TEMPERATURE: 98 F | OXYGEN SATURATION: 97 % | HEART RATE: 83 BPM | BODY MASS INDEX: 32.44 KG/M2 | HEIGHT: 64 IN | SYSTOLIC BLOOD PRESSURE: 111 MMHG | WEIGHT: 190 LBS

## 2024-11-15 DIAGNOSIS — O14.93 PRE-ECLAMPSIA IN THIRD TRIMESTER: Primary | ICD-10-CM

## 2024-11-15 ASSESSMENT — PATIENT HEALTH QUESTIONNAIRE - PHQ9
SUM OF ALL RESPONSES TO PHQ QUESTIONS 1-9: 0
SUM OF ALL RESPONSES TO PHQ QUESTIONS 1-9: 0
2. FEELING DOWN, DEPRESSED OR HOPELESS: NOT AT ALL
SUM OF ALL RESPONSES TO PHQ QUESTIONS 1-9: 0
1. LITTLE INTEREST OR PLEASURE IN DOING THINGS: NOT AT ALL
SUM OF ALL RESPONSES TO PHQ QUESTIONS 1-9: 0
SUM OF ALL RESPONSES TO PHQ9 QUESTIONS 1 & 2: 0

## 2024-11-15 NOTE — PROGRESS NOTES
Room 20    [address] session code   Patient is accompanied by partner. I have received verbal consent from Rochelle Giraldo to discuss any/all medical information while they are present in the room.    Identified pt with two pt identifiers(name and ). Reviewed record in preparation for visit and have obtained necessary documentation.  Chief Complaint   Patient presents with    Hypertension      Checking BP at home and checking once daily - 110/ > ?      Having headaches daily and having to take Ibuprofen ( not breastfeeding) states is drinking water    Health Maintenance Due   Topic    Pneumococcal 0-64 years Vaccine (1 of 2 - PCV)    A1C test (Diabetic or Prediabetic)     Varicella vaccine (1 of 2 - 13+ 2-dose series)    HIV screen     Flu vaccine (1)    COVID-19 Vaccine ( -  season)    Hepatitis B vaccine (2 of 3 - 19+ 3-dose series)    Cervical cancer screen        Vitals:    11/15/24 1306   BP: 111/72   Site: Left Upper Arm   Position: Sitting   Cuff Size: Large Adult   Pulse: (!) 103   Resp: 18   Temp: 98 °F (36.7 °C)   TempSrc: Temporal   SpO2: 97%   Weight: 86.2 kg (190 lb)   Height: 1.626 m (5' 4\")       Social Determinants Of Health:       SDOH screening not required at visit.  Resources Declined.   See AVS for attached resources, if requested.    Coordination of Care Questionnaire:       \"Have you been to the ER, urgent care clinic since your last visit?  Hospitalized since your last visit?\"    NO    “Have you seen or consulted any other health care providers outside of [facility] since your last visit?”    NO     “Have you had a pap smear?”    NO    No cervical cancer screening on file             Click Here for Release of Records Request

## 2024-12-02 NOTE — PROGRESS NOTES
[address] [id number]   Office [phone], Fax [phone]    Postpartum Note    Subjective:   History provided by patient.  29 y.o. female status post LTCS at 32w1d presenting for postpartum visit.  Patient doing well post-partum without significant complaint.  Here for 6wk PP check.     Pregnancy otherwise complicated by A1GDM, maternal obesity, ABRAHAM, severe Pre E with development of pulmonary edema and PRES on CT and MRI. During post-op course pt twice reported substernal chest pressure during which episodes CXR was improved from prior and EKG was normal, likely due to panic attacks iso recent stressors, these episodes improved with social support and reassurance, pt declines medication.      #PreE/ PRES  - BP log at home: did not bring, but has been controlled  - BP today in office: 123/76  - Currently on Nifedipine 30mg XR qd  - has occasional HA, improves with tylenol  - denies CP, SOB    #A1GDM  - 2hr GTT today  - BG at home: does not take  - diet: rice, chicken, meat, soups    Lochia: normal  Pain: has pain at R side of incision and below incision, starting 3 days ago. 8/10 pain. Taking ibuprofen, helps a little. Also having itching in inguinal/gluteal folds. No blood or drainage from skin or scar.  Baby: doing well, has seen PCP  Sexual activity: not yet   Plan for contraception: wants BTL, willing to go to [facility]  Breast/bottle: bottle only  Support from FOB/family: yes  Symptoms of depression: none  EPDS score: 0        Objective:     Vitals:  /76 (Site: Right Upper Arm, Position: Sitting)   Pulse 83   Temp 97.8 °F (36.6 °C) (Oral)   Resp 18   Ht 1.626 m (5' 4\")   Wt 87.1 kg (192 lb)   LMP 10/21/2024   SpO2 98%   BMI 32.96 kg/m²     Exam:  Patient without distress.    Abdomen soft, fundus firm at level of umbilicus, nontender.               Lower extremities:  No edema. No palpable cords or tenderness.    Skin: low transverse  incision is clean dry

## 2024-12-03 ENCOUNTER — OFFICE VISIT (OUTPATIENT)
Age: 29
End: 2024-12-03
Payer: MEDICAID

## 2024-12-03 VITALS
RESPIRATION RATE: 18 BRPM | TEMPERATURE: 97.8 F | HEIGHT: 64 IN | DIASTOLIC BLOOD PRESSURE: 76 MMHG | HEART RATE: 83 BPM | SYSTOLIC BLOOD PRESSURE: 123 MMHG | WEIGHT: 192 LBS | OXYGEN SATURATION: 98 % | BODY MASS INDEX: 32.78 KG/M2

## 2024-12-03 DIAGNOSIS — O24.419 GESTATIONAL DIABETES MELLITUS (GDM), ANTEPARTUM, GESTATIONAL DIABETES METHOD OF CONTROL UNSPECIFIED: ICD-10-CM

## 2024-12-03 PROCEDURE — 99213 OFFICE O/P EST LOW 20 MIN: CPT

## 2024-12-03 ASSESSMENT — PATIENT HEALTH QUESTIONNAIRE - PHQ9
SUM OF ALL RESPONSES TO PHQ QUESTIONS 1-9: 0
SUM OF ALL RESPONSES TO PHQ9 QUESTIONS 1 & 2: 0
SUM OF ALL RESPONSES TO PHQ QUESTIONS 1-9: 0
1. LITTLE INTEREST OR PLEASURE IN DOING THINGS: NOT AT ALL
SUM OF ALL RESPONSES TO PHQ QUESTIONS 1-9: 0
SUM OF ALL RESPONSES TO PHQ QUESTIONS 1-9: 0
2. FEELING DOWN, DEPRESSED OR HOPELESS: NOT AT ALL

## 2024-12-03 NOTE — PROGRESS NOTES
Rochelle Giraldo is a 29 y.o. female      Chief Complaint   Patient presents with    Postpartum Care     Patient had  on 10/21/2024. No other concerns.       \"Have you been to the ER, urgent care clinic since your last visit?  Hospitalized since your last visit?\"    NO    “Have you seen or consulted any other health care providers outside of [facility] since your last visit?”    NO         Vitals:    24 0915   BP: 123/76   Site: Right Upper Arm   Position: Sitting   Pulse: 83   Resp: 18   Temp: 97.8 °F (36.6 °C)   TempSrc: Oral   SpO2: 98%   Weight: 87.1 kg (192 lb)   Height: 1.626 m (5' 4\")            Health Maintenance Due   Topic Date Due    Pneumococcal 0-64 years Vaccine (1 of 2 - PCV) Never done    A1C test (Diabetic or Prediabetic)  Never done    Varicella vaccine (1 of 2 - 13+ 2-dose series) Never done    HIV screen  Never done    Flu vaccine (1) Never done    COVID-19 Vaccine ( -  season) Never done    Hepatitis B vaccine (2 of 3 - 19+ 3-dose series) 2024    Cervical cancer screen  2024         Medication Reconciliation completed, changes noted.  Please  Update medication list.

## 2024-12-03 NOTE — PATIENT INSTRUCTIONS
Thank you for coming to your visit. It was a pleasure taking care of you!    You likely have a seroma on your C section scar, which is a mild fluid collection that can cause pain. Use warm compresses 3 times a day and then use Ibuprofen 3 times a day for pain.     Otherwise, you are doing well after your delivery!       Please make a follow up appointment in 1 week to check your C section scar.       Please let me know if you have any questions.    Thank you,  Dr. Johnston

## 2024-12-04 ENCOUNTER — TELEPHONE (OUTPATIENT)
Age: 29
End: 2024-12-04

## 2024-12-04 LAB
GLUCOSE 1H P 75 G GLC PO SERPL-MCNC: ABNORMAL MG/DL
GLUCOSE 2H P 75 G GLC PO SERPL-MCNC: 123 MG/DL (ref 70–152)
GLUCOSE P FAST SERPL-MCNC: 95 MG/DL (ref 70–91)

## 2024-12-04 NOTE — TELEPHONE ENCOUNTER
Called Rochelle Giraldo to discuss lab results. Unable to reach patient via  phone or leave message. Will try again at another time.

## 2024-12-05 ENCOUNTER — TELEPHONE (OUTPATIENT)
Age: 29
End: 2024-12-05

## 2024-12-05 NOTE — TELEPHONE ENCOUNTER
Patient called back stating that she received a call. I informed patient that it was the doctor calling to discuss lab results. Would like to be contacted again at your earliest convenience.    Thanks!

## 2024-12-05 NOTE — TELEPHONE ENCOUNTER
Called Rochelle Giraldo to discuss lab results. Reached patient. Identified by name and . Discussed normal GTT postpartum. Discussed she should still maintain a low carb, low sugar diet given her risk of DM in the future and to maintain healthy weight. Pt understands. All questions answered. She will make an appointment for next week to check her C section scar and seroma.       Rosario Johnston MD 12:45 PM 2024   PGY-1 Resident  [facility]

## 2024-12-05 NOTE — TELEPHONE ENCOUNTER
----- Message from Dr. Rosario Johnston MD sent at 12/4/2024 11:13 AM EST -----  Regarding: Patient needs 1 week follow up  Hello,     Please schedule for a follow up in 1 week (to check C section scar/seroma) and contact this patient with the details. She needs to be seen and she did not make an appointment yesterday. So it is important she come in again. I tried calling her just now but her number is not allowing even to leave a voicemail on my end. I appreciate the help!    Thank you,  Dr. Johnston

## 2024-12-09 ENCOUNTER — OFFICE VISIT (OUTPATIENT)
Age: 29
End: 2024-12-09
Payer: MEDICAID

## 2024-12-09 VITALS
DIASTOLIC BLOOD PRESSURE: 64 MMHG | TEMPERATURE: 98 F | BODY MASS INDEX: 32.3 KG/M2 | OXYGEN SATURATION: 97 % | HEIGHT: 64 IN | SYSTOLIC BLOOD PRESSURE: 96 MMHG | WEIGHT: 189.2 LBS | HEART RATE: 72 BPM

## 2024-12-09 NOTE — PROGRESS NOTES
[id number] [place] [address]   Office [phone], Fax [phone]    Postpartum Note    Subjective:   History provided by patient.  29 y.o. female status post LTCS at 32w1d presenting for postpartum visit.  Patient doing well post-partum without significant complaint.  Here for 6wk PP check.     Pregnancy otherwise complicated by A1GDM, maternal obesity, ABRAHAM, severe Pre E with development of pulmonary edema and PRES on CT and MRI. During post-op course pt twice reported substernal chest pressure during which episodes CXR was improved from prior and EKG was normal, likely due to panic attacks iso recent stressors, these episodes improved with social support and reassurance, pt declines medication.      #Post- section seroma  - had pain at R side of incision and below incision, started beginning of December a little over 1 week ago.  - NOW RESOLVED  - no blood or drainage from skin or scar    #PreE/ PRES  - BP log at home: did not bring, but numbers at home have been 110s systolics.  - BP today in office: 96/64  - Currently on Nifedipine 30mg XR qd  - No high BP before pregnancy  - has occasional HA, improves with tylenol  - denies CP, SOB    #A1GDM  - 2hr GTT done at last visit, normal  - diet: rice, chicken, meat, soups    Lochia: normal  Baby: doing well, has seen PCP  Sexual activity: not yet   Plan for contraception: wants BTL, has been historically referred to [facility]  Breast/bottle: bottle only  Support from FOB/family: yes  Symptoms of depression: none  EPDS score: 0 last visit, 3 today        Objective:     Vitals:  Ht 1.626 m (5' 4.02\")   Wt 85.8 kg (189 lb 3.2 oz)   LMP 10/21/2024   BMI 32.46 kg/m²     Exam:  Patient without distress.    Abdomen soft, fundus firm at level of umbilicus, nontender.               Lower extremities:  No edema. No palpable cords or tenderness.    Skin: low transverse  incision is clean dry and intact.  Non-tender, no lesions.No

## 2024-12-09 NOTE — PROGRESS NOTES
Rochelle Giraldo is a 29 y.o. female      Chief Complaint   Patient presents with    Wound Check     Of .  Pain last week but has resolved.  10/21/24 delivery.  Tubal ligation questions.       \"Have you been to the ER, urgent care clinic since your last visit?  Hospitalized since your last visit?\"    NO    “Have you seen or consulted any other health care providers outside of [facility] since your last visit?”    NO     “Have you had a pap smear?”    YES - Where: 10/17/2023 at [facility] Nurse/CMA to request most recent records if not in the chart    No cervical cancer screening on file             Click Here for Release of Records Request    Vitals:    24 1534   Weight: 85.8 kg (189 lb 3.2 oz)   Height: 1.626 m (5' 4.02\")           Medication Reconciliation Completed, changes notes. Please Update medication list.

## 2025-01-13 ENCOUNTER — OFFICE VISIT (OUTPATIENT)
Age: 30
End: 2025-01-13

## 2025-01-13 DIAGNOSIS — Z78.9 NEEDS ASSISTANCE WITH COMMUNITY RESOURCES: Primary | ICD-10-CM

## 2025-01-13 NOTE — PROGRESS NOTES
Resource visit with [facility] Navigator.    Patient in need of assistance with applying for WIC program for her . Patient reports she did not apply in past as she was worried that it could affect her immigration process.    LUDY assisted patient by connecting her with [facility] office. Left message for rep to contact patient. Patient advised that she will receive a call from [facility] regarding an appointment.    Plan:  Ongoing psychosocial support and resource referral, as desired by the patient and family.      [facility]ano [facility]   Navigator

## 2025-07-27 ENCOUNTER — HOSPITAL ENCOUNTER (EMERGENCY)
Facility: HOSPITAL | Age: 30
Discharge: HOME | End: 2025-07-27
Attending: EMERGENCY MEDICINE
Payer: MEDICAID

## 2025-07-27 VITALS
DIASTOLIC BLOOD PRESSURE: 78 MMHG | SYSTOLIC BLOOD PRESSURE: 135 MMHG | TEMPERATURE: 97.3 F | RESPIRATION RATE: 14 BRPM | HEART RATE: 87 BPM | OXYGEN SATURATION: 100 %

## 2025-07-27 DIAGNOSIS — J32.1 CHRONIC FRONTAL SINUSITIS: Primary | ICD-10-CM

## 2025-07-27 PROCEDURE — 6370000000 HC RX 637 (ALT 250 FOR IP): Performed by: EMERGENCY MEDICINE

## 2025-07-27 PROCEDURE — 99283 EMERGENCY DEPT VISIT LOW MDM: CPT

## 2025-07-27 RX ORDER — GUAIFENESIN 600 MG/1
600 TABLET, EXTENDED RELEASE ORAL ONCE
Status: COMPLETED | OUTPATIENT
Start: 2025-07-27 | End: 2025-07-27

## 2025-07-27 RX ORDER — IBUPROFEN 600 MG/1
600 TABLET, FILM COATED ORAL EVERY 6 HOURS PRN
Qty: 28 TABLET | Refills: 0 | Status: SHIPPED | OUTPATIENT
Start: 2025-07-27 | End: 2025-08-03

## 2025-07-27 RX ORDER — CETIRIZINE HYDROCHLORIDE 10 MG/1
10 TABLET ORAL DAILY
Qty: 30 TABLET | Refills: 0 | Status: SHIPPED | OUTPATIENT
Start: 2025-07-27 | End: 2025-08-26

## 2025-07-27 RX ORDER — FLUTICASONE PROPIONATE 50 MCG
2 SPRAY, SUSPENSION (ML) NASAL DAILY
Qty: 32 G | Refills: 1 | Status: SHIPPED | OUTPATIENT
Start: 2025-07-27

## 2025-07-27 RX ORDER — IBUPROFEN 600 MG/1
600 TABLET, FILM COATED ORAL
Status: COMPLETED | OUTPATIENT
Start: 2025-07-27 | End: 2025-07-27

## 2025-07-27 RX ORDER — ACETAMINOPHEN 500 MG
1000 TABLET ORAL 3 TIMES DAILY
Qty: 120 TABLET | Refills: 3 | Status: SHIPPED | OUTPATIENT
Start: 2025-07-27

## 2025-07-27 RX ORDER — ACETAMINOPHEN 500 MG
1000 TABLET ORAL
Status: COMPLETED | OUTPATIENT
Start: 2025-07-27 | End: 2025-07-27

## 2025-07-27 RX ORDER — GUAIFENESIN 600 MG/1
1200 TABLET, EXTENDED RELEASE ORAL 2 TIMES DAILY
Qty: 40 TABLET | Refills: 0 | Status: SHIPPED | OUTPATIENT
Start: 2025-07-27 | End: 2025-08-06

## 2025-07-27 RX ADMIN — AMOXICILLIN AND CLAVULANATE POTASSIUM 1 TABLET: 875; 125 TABLET, FILM COATED ORAL at 03:08

## 2025-07-27 RX ADMIN — IBUPROFEN 600 MG: 600 TABLET, FILM COATED ORAL at 03:08

## 2025-07-27 RX ADMIN — GUAIFENESIN 600 MG: 600 TABLET, EXTENDED RELEASE ORAL at 03:09

## 2025-07-27 RX ADMIN — ACETAMINOPHEN 1000 MG: 500 TABLET ORAL at 03:08

## 2025-07-27 ASSESSMENT — PAIN SCALES - GENERAL: PAINLEVEL_OUTOF10: 8

## 2025-07-27 ASSESSMENT — PAIN - FUNCTIONAL ASSESSMENT: PAIN_FUNCTIONAL_ASSESSMENT: 0-10

## 2025-07-27 NOTE — ED PROVIDER NOTES
presenting the ER with report of left-sided facial pain.         Nursing Notes were reviewed.    REVIEW OF SYSTEMS       Review of Systems      PAST MEDICAL HISTORY     Past Medical History:   Diagnosis Date    Anxiety     Depression     Gestational diabetes     Pregnancy due          SURGICAL HISTORY       Past Surgical History:   Procedure Laterality Date     SECTION N/A 10/21/2024     SECTION performed by Bernadette Mirza MD at [facility] OR    DILATION AND CURETTAGE OF UTERUS      LIPOSUCTION           CURRENT MEDICATIONS       Discharge Medication List as of 2025  2:58 AM        CONTINUE these medications which have NOT CHANGED    Details   NIFEdipine (PROCARDIA XL) 30 MG extended release tablet Take 1 tablet by mouth daily, Disp-30 tablet, R-0Normal      Blood Pressure Monitoring (BLOOD PRESSURE CUFF) MISC DAILY Starting Fri 10/25/2024, Disp-1 each, R-0, NormalCheck BP before taking nifedepine      Prenatal Vit-Fe Fumarate-FA (PRENATAL VITAMINS PO) Take by mouthHistorical Med             ALLERGIES     Patient has no known allergies.    FAMILY HISTORY       Family History   Problem Relation Age of Onset    No Known Problems Mother     No Known Problems Father           SOCIAL HISTORY       Social History     Socioeconomic History    Marital status:     Number of children: 2   Tobacco Use    Smoking status: Never     Passive exposure: Never    Smokeless tobacco: Never   Vaping Use    Vaping status: Never Used   Substance and Sexual Activity    Alcohol use: Not Currently    Drug use: No    Sexual activity: Yes     Partners: Male     Comment: denies h/o STIs   Social History Narrative    ** Merged History Encounter **          Social Drivers of Health     Financial Resource Strain: Low Risk  (2024)    Overall Financial Resource Strain (CARDIA)     Difficulty of Paying Living Expenses: Not hard at all   Food Insecurity: No Food Insecurity (10/20/2024)    Hunger Vital Sign     Worried

## 2025-07-27 NOTE — ED NOTES
Discharge paperwork provided and reviewed with patient. Patient has no questions at this time. Ambulatory out of ED.

## 2025-07-27 NOTE — ED TRIAGE NOTES
Left facial pain since yesterday. Pain worse today. Patient denies any dental complaints. No facial swelling noted. Patient has been taking Tylenol for the pain without relief

## (undated) DEVICE — DRESSING BORDERED ADH GZ UNIV GEN USE 8INX4IN AND 6INX2IN

## (undated) DEVICE — SOLUTION IV 1000ML 0.9% SOD CHL

## (undated) DEVICE — STAPLER SKIN H3.9MM WIRE DIA0.58MM CRWN 6.9MM 35 STPL ROT

## (undated) DEVICE — SUTURE PDS II SZ 1 L36IN ABSRB VLT CT L40MM 1/2 CIR TAPR Z359T

## (undated) DEVICE — GARMENT,MEDLINE,DVT,INT,CALF,MED, GEN2: Brand: MEDLINE

## (undated) DEVICE — STERILE LATEX POWDER-FREE SURGICAL GLOVESWITH NITRILE COATING: Brand: PROTEXIS

## (undated) DEVICE — Z INACTIVE NO ACTIVE SUPPLIER APPLICATOR MEDICATED 26 CC TINT HI-LITE ORNG STRL CHLORAPREP

## (undated) DEVICE — PENCIL ES L3M ROCK SWCH S STL HEX LOK BLDE ELECTRD HOLSTER

## (undated) DEVICE — TRAY PREP DRY W/ PREM GLV 2 APPL 6 SPNG 2 UNDPD 1 OVERWRAP

## (undated) DEVICE — TOWEL,OR,DSP,ST,BLUE,STD,2/PK,40PK/CS: Brand: MEDLINE

## (undated) DEVICE — C-SECTION II-LF: Brand: MEDLINE INDUSTRIES, INC.

## (undated) DEVICE — Z DUP USE 2271313 SOLIDIFIER FLUID 3000 CC ABSORB

## (undated) DEVICE — SYRINGE IRRIG 60ML SFT PLIABLE BLB EZ TO GRP 1 HND USE W/

## (undated) DEVICE — 3M™ MEDIPORE™ H SOFT CLOTH SURGICAL TAPE, 2863, 3 IN X 10 YD, 12/CASE: Brand: 3M™ MEDIPORE™

## (undated) DEVICE — TRAY,URINE METER,100% SILICONE,16FR10ML: Brand: MEDLINE

## (undated) DEVICE — ELECTRODE PT RET AD L9FT HI MOIST COND ADH HYDRGEL CORDED

## (undated) DEVICE — Z DISCONTINUED USE 2220179 SUTURE VCRL SZ 0 L36IN ABSRB VLT L40MM CT 1/2 CIR J358H

## (undated) DEVICE — INTENT OT USE PROVIDES A STERILE INTERFACE BETWEEN THE OPERATING ROOM SURGICAL LAMPS (NON-STERILE) AND THE SURGEON OR STAFF WORKING IN THE STERILE FIELD.: Brand: ASPEN® ALC PLUS LIGHT HANDLE COVER

## (undated) DEVICE — 3000CC GUARDIAN II: Brand: GUARDIAN

## (undated) DEVICE — CONNEXT SURGICAL MATRIX - SMALL SYRINGE: Brand: CONNEXT SURGICAL MATRIX

## (undated) DEVICE — BLADE CLIPPER GEN PURP NS

## (undated) DEVICE — POOLE SUCTION INSTRUMENT WITH REMOVABLE SHEATH: Brand: POOLE

## (undated) DEVICE — CONNEXT SURGICAL MATRIX - LARGE SYRINGE: Brand: CONNEXT SURGICAL MATRIX

## (undated) DEVICE — CLEANER ES TIP W2XL2IN ADH BK RADPQ FOR S STL ELECTRD

## (undated) DEVICE — Z DISCONTINUED NO SUB IDED SPONGE LAPAROTOMY W18XL18IN WHITE STRUNG RADIOPAQUE STERILE

## (undated) DEVICE — CATHETER URIN 16FR 30CC BLLN 2 W F LUBRI-SIL IC